# Patient Record
Sex: FEMALE | Race: WHITE | URBAN - METROPOLITAN AREA
[De-identification: names, ages, dates, MRNs, and addresses within clinical notes are randomized per-mention and may not be internally consistent; named-entity substitution may affect disease eponyms.]

---

## 2017-06-15 ENCOUNTER — OUTPATIENT (OUTPATIENT)
Dept: OUTPATIENT SERVICES | Facility: HOSPITAL | Age: 47
LOS: 1 days | Discharge: HOME | End: 2017-06-15

## 2017-06-15 DIAGNOSIS — R07.9 CHEST PAIN, UNSPECIFIED: ICD-10-CM

## 2017-06-15 DIAGNOSIS — R00.1 BRADYCARDIA, UNSPECIFIED: ICD-10-CM

## 2017-06-28 DIAGNOSIS — Z00.00 ENCOUNTER FOR GENERAL ADULT MEDICAL EXAMINATION WITHOUT ABNORMAL FINDINGS: ICD-10-CM

## 2018-06-01 ENCOUNTER — OUTPATIENT (OUTPATIENT)
Dept: OUTPATIENT SERVICES | Facility: HOSPITAL | Age: 48
LOS: 1 days | Discharge: HOME | End: 2018-06-01

## 2018-06-01 DIAGNOSIS — M25.561 PAIN IN RIGHT KNEE: ICD-10-CM

## 2018-06-20 ENCOUNTER — OUTPATIENT (OUTPATIENT)
Dept: OUTPATIENT SERVICES | Facility: HOSPITAL | Age: 48
LOS: 1 days | Discharge: HOME | End: 2018-06-20

## 2018-06-20 DIAGNOSIS — M25.561 PAIN IN RIGHT KNEE: ICD-10-CM

## 2018-06-20 DIAGNOSIS — M71.21 SYNOVIAL CYST OF POPLITEAL SPACE [BAKER], RIGHT KNEE: ICD-10-CM

## 2018-07-09 ENCOUNTER — OUTPATIENT (OUTPATIENT)
Dept: OUTPATIENT SERVICES | Facility: HOSPITAL | Age: 48
LOS: 1 days | Discharge: HOME | End: 2018-07-09

## 2018-07-09 DIAGNOSIS — M54.9 DORSALGIA, UNSPECIFIED: ICD-10-CM

## 2018-07-24 ENCOUNTER — OUTPATIENT (OUTPATIENT)
Dept: OUTPATIENT SERVICES | Facility: HOSPITAL | Age: 48
LOS: 1 days | Discharge: HOME | End: 2018-07-24

## 2018-07-24 DIAGNOSIS — R51 HEADACHE: ICD-10-CM

## 2019-01-08 ENCOUNTER — OUTPATIENT (OUTPATIENT)
Dept: OUTPATIENT SERVICES | Facility: HOSPITAL | Age: 49
LOS: 1 days | Discharge: HOME | End: 2019-01-08

## 2019-01-08 DIAGNOSIS — M54.2 CERVICALGIA: ICD-10-CM

## 2019-01-31 ENCOUNTER — OUTPATIENT (OUTPATIENT)
Dept: OUTPATIENT SERVICES | Facility: HOSPITAL | Age: 49
LOS: 1 days | Discharge: HOME | End: 2019-01-31

## 2019-01-31 DIAGNOSIS — F43.20 ADJUSTMENT DISORDER, UNSPECIFIED: ICD-10-CM

## 2019-03-13 ENCOUNTER — OUTPATIENT (OUTPATIENT)
Dept: OUTPATIENT SERVICES | Facility: HOSPITAL | Age: 49
LOS: 1 days | Discharge: HOME | End: 2019-03-13

## 2019-03-13 DIAGNOSIS — F43.20 ADJUSTMENT DISORDER, UNSPECIFIED: ICD-10-CM

## 2019-03-29 ENCOUNTER — OUTPATIENT (OUTPATIENT)
Dept: OUTPATIENT SERVICES | Facility: HOSPITAL | Age: 49
LOS: 1 days | Discharge: HOME | End: 2019-03-29

## 2019-03-29 DIAGNOSIS — F43.20 ADJUSTMENT DISORDER, UNSPECIFIED: ICD-10-CM

## 2019-04-12 ENCOUNTER — OUTPATIENT (OUTPATIENT)
Dept: OUTPATIENT SERVICES | Facility: HOSPITAL | Age: 49
LOS: 1 days | Discharge: HOME | End: 2019-04-12

## 2019-04-12 DIAGNOSIS — F43.20 ADJUSTMENT DISORDER, UNSPECIFIED: ICD-10-CM

## 2019-04-19 ENCOUNTER — OUTPATIENT (OUTPATIENT)
Dept: OUTPATIENT SERVICES | Facility: HOSPITAL | Age: 49
LOS: 1 days | Discharge: HOME | End: 2019-04-19

## 2019-04-19 DIAGNOSIS — F43.20 ADJUSTMENT DISORDER, UNSPECIFIED: ICD-10-CM

## 2019-04-26 ENCOUNTER — OUTPATIENT (OUTPATIENT)
Dept: OUTPATIENT SERVICES | Facility: HOSPITAL | Age: 49
LOS: 1 days | Discharge: HOME | End: 2019-04-26

## 2019-04-26 DIAGNOSIS — F43.20 ADJUSTMENT DISORDER, UNSPECIFIED: ICD-10-CM

## 2019-05-14 ENCOUNTER — OUTPATIENT (OUTPATIENT)
Dept: OUTPATIENT SERVICES | Facility: HOSPITAL | Age: 49
LOS: 1 days | Discharge: HOME | End: 2019-05-14

## 2019-05-14 DIAGNOSIS — F43.20 ADJUSTMENT DISORDER, UNSPECIFIED: ICD-10-CM

## 2019-05-31 PROBLEM — Z00.00 ENCOUNTER FOR PREVENTIVE HEALTH EXAMINATION: Status: ACTIVE | Noted: 2019-05-31

## 2019-06-12 ENCOUNTER — APPOINTMENT (OUTPATIENT)
Dept: NEUROSURGERY | Facility: CLINIC | Age: 49
End: 2019-06-12

## 2020-02-07 ENCOUNTER — TRANSCRIPTION ENCOUNTER (OUTPATIENT)
Age: 50
End: 2020-02-07

## 2021-03-09 ENCOUNTER — APPOINTMENT (OUTPATIENT)
Dept: UROLOGY | Facility: CLINIC | Age: 51
End: 2021-03-09

## 2021-07-13 ENCOUNTER — TRANSCRIPTION ENCOUNTER (OUTPATIENT)
Age: 51
End: 2021-07-13

## 2022-04-11 ENCOUNTER — APPOINTMENT (OUTPATIENT)
Dept: CARDIOLOGY | Facility: CLINIC | Age: 52
End: 2022-04-11
Payer: MEDICARE

## 2022-04-11 VITALS
OXYGEN SATURATION: 97 % | HEIGHT: 65 IN | DIASTOLIC BLOOD PRESSURE: 62 MMHG | SYSTOLIC BLOOD PRESSURE: 100 MMHG | HEART RATE: 57 BPM | WEIGHT: 100 LBS | TEMPERATURE: 97.4 F | BODY MASS INDEX: 16.66 KG/M2

## 2022-04-11 VITALS — HEIGHT: 65 IN | TEMPERATURE: 97.4 F | BODY MASS INDEX: 16.66 KG/M2 | WEIGHT: 100 LBS

## 2022-04-11 DIAGNOSIS — R00.2 PALPITATIONS: ICD-10-CM

## 2022-04-11 PROCEDURE — 93000 ELECTROCARDIOGRAM COMPLETE: CPT

## 2022-04-11 PROCEDURE — 99203 OFFICE O/P NEW LOW 30 MIN: CPT

## 2022-04-16 PROBLEM — R00.2 PALPITATIONS: Status: ACTIVE | Noted: 2022-04-11

## 2022-04-16 NOTE — ASSESSMENT
[FreeTextEntry1] : Assessment:\par #Palpitations\par - EKG sinus leidy 57 bpm\par #Former smoker\par \par Plan:\par - TTE\par - 14 day e-patch\par - Return to clinic in 3 months\par

## 2022-04-16 NOTE — PHYSICAL EXAM
[Well Developed] : well developed [Well Nourished] : well nourished [No Acute Distress] : no acute distress [Normal Conjunctiva] : normal conjunctiva [No Carotid Bruit] : no carotid bruit [Normal S1, S2] : normal S1, S2 [No Murmur] : no murmur [No Rub] : no rub [No Gallop] : no gallop [Clear Lung Fields] : clear lung fields [Good Air Entry] : good air entry [No Respiratory Distress] : no respiratory distress  [Soft] : abdomen soft [Moves all extremities] : moves all extremities [No Focal Deficits] : no focal deficits [Normal Speech] : normal speech [No ulcers] : no ulcers [No edema] : no edema [Present] : present bilaterally [de-identified] : Thin

## 2022-04-16 NOTE — REVIEW OF SYSTEMS
[Fever] : no fever [Chills] : no chills [SOB] : shortness of breath [Dyspnea on exertion] : not dyspnea during exertion [Chest Discomfort] : no chest discomfort [Lower Ext Edema] : no extremity edema [Leg Claudication] : no intermittent leg claudication [Palpitations] : palpitations [Orthopnea] : no orthopnea [PND] : no PND [Cough] : no cough [Syncope] : no syncope [Abdominal Pain] : no abdominal pain [Joint Pain] : no joint pain [Dizziness] : no dizziness [Confusion] : no confusion was observed

## 2022-04-16 NOTE — HISTORY OF PRESENT ILLNESS
[FreeTextEntry1] : 51F  with fibromyalgia, epilepsy, asthma, Raynaud's here for evaluation of palpitations. \par \par Pfizer COVID vaccine #1 - palpitations and SOB  a few hours after. Now feels frequent palpitations, lasts a few seconds. No lightheadedness/dizziness, pre-syncope/syncope, or lower extremity edema.\par \par Gluten sensitive \par \par Tobacco use - quit 20 years prior\par \par Work: not currently working, disabled\par \par Exercise: walking, light cardio, light resistance training, yoga\par \par FMH\par Father - kidney failure, rheumatic\par No FMH of CAD or stroke \par

## 2022-05-18 ENCOUNTER — INPATIENT (INPATIENT)
Facility: HOSPITAL | Age: 52
LOS: 5 days | Discharge: HOME | End: 2022-05-24
Attending: STUDENT IN AN ORGANIZED HEALTH CARE EDUCATION/TRAINING PROGRAM | Admitting: STUDENT IN AN ORGANIZED HEALTH CARE EDUCATION/TRAINING PROGRAM
Payer: MEDICARE

## 2022-05-18 VITALS
TEMPERATURE: 97 F | OXYGEN SATURATION: 98 % | HEART RATE: 79 BPM | RESPIRATION RATE: 17 BRPM | SYSTOLIC BLOOD PRESSURE: 106 MMHG | DIASTOLIC BLOOD PRESSURE: 68 MMHG

## 2022-05-18 LAB
ALBUMIN SERPL ELPH-MCNC: 4.6 G/DL — SIGNIFICANT CHANGE UP (ref 3.5–5.2)
ALP SERPL-CCNC: 72 U/L — SIGNIFICANT CHANGE UP (ref 30–115)
ALT FLD-CCNC: 14 U/L — SIGNIFICANT CHANGE UP (ref 0–41)
ANION GAP SERPL CALC-SCNC: 13 MMOL/L — SIGNIFICANT CHANGE UP (ref 7–14)
AST SERPL-CCNC: 27 U/L — SIGNIFICANT CHANGE UP (ref 0–41)
BASOPHILS # BLD AUTO: 0.03 K/UL — SIGNIFICANT CHANGE UP (ref 0–0.2)
BASOPHILS NFR BLD AUTO: 0.9 % — SIGNIFICANT CHANGE UP (ref 0–1)
BILIRUB SERPL-MCNC: 0.4 MG/DL — SIGNIFICANT CHANGE UP (ref 0.2–1.2)
BUN SERPL-MCNC: 12 MG/DL — SIGNIFICANT CHANGE UP (ref 10–20)
CALCIUM SERPL-MCNC: 9.7 MG/DL — SIGNIFICANT CHANGE UP (ref 8.5–10.1)
CHLORIDE SERPL-SCNC: 110 MMOL/L — SIGNIFICANT CHANGE UP (ref 98–110)
CO2 SERPL-SCNC: 23 MMOL/L — SIGNIFICANT CHANGE UP (ref 17–32)
CREAT SERPL-MCNC: 0.9 MG/DL — SIGNIFICANT CHANGE UP (ref 0.7–1.5)
EGFR: 76 ML/MIN/1.73M2 — SIGNIFICANT CHANGE UP
EOSINOPHIL # BLD AUTO: 0 K/UL — SIGNIFICANT CHANGE UP (ref 0–0.7)
EOSINOPHIL NFR BLD AUTO: 0 % — SIGNIFICANT CHANGE UP (ref 0–8)
GIANT PLATELETS BLD QL SMEAR: PRESENT — SIGNIFICANT CHANGE UP
GLUCOSE SERPL-MCNC: 96 MG/DL — SIGNIFICANT CHANGE UP (ref 70–99)
HCG SERPL QL: NEGATIVE — SIGNIFICANT CHANGE UP
HCT VFR BLD CALC: 39 % — SIGNIFICANT CHANGE UP (ref 37–47)
HGB BLD-MCNC: 13.6 G/DL — SIGNIFICANT CHANGE UP (ref 12–16)
LIDOCAIN IGE QN: 88 U/L — HIGH (ref 7–60)
LYMPHOCYTES # BLD AUTO: 0.82 K/UL — LOW (ref 1.2–3.4)
LYMPHOCYTES # BLD AUTO: 28.3 % — SIGNIFICANT CHANGE UP (ref 20.5–51.1)
MAGNESIUM SERPL-MCNC: 2.2 MG/DL — SIGNIFICANT CHANGE UP (ref 1.8–2.4)
MANUAL SMEAR VERIFICATION: SIGNIFICANT CHANGE UP
MCHC RBC-ENTMCNC: 34.3 PG — HIGH (ref 27–31)
MCHC RBC-ENTMCNC: 34.9 G/DL — SIGNIFICANT CHANGE UP (ref 32–37)
MCV RBC AUTO: 98.5 FL — SIGNIFICANT CHANGE UP (ref 81–99)
METAMYELOCYTES # FLD: 0.9 % — HIGH (ref 0–0)
MONOCYTES # BLD AUTO: 0.28 K/UL — SIGNIFICANT CHANGE UP (ref 0.1–0.6)
MONOCYTES NFR BLD AUTO: 9.7 % — HIGH (ref 1.7–9.3)
NEUTROPHILS # BLD AUTO: 1.55 K/UL — SIGNIFICANT CHANGE UP (ref 1.4–6.5)
NEUTROPHILS NFR BLD AUTO: 53.1 % — SIGNIFICANT CHANGE UP (ref 42.2–75.2)
PLAT MORPH BLD: NORMAL — SIGNIFICANT CHANGE UP
PLATELET # BLD AUTO: 210 K/UL — SIGNIFICANT CHANGE UP (ref 130–400)
POIKILOCYTOSIS BLD QL AUTO: SLIGHT — SIGNIFICANT CHANGE UP
POTASSIUM SERPL-MCNC: 5.2 MMOL/L — HIGH (ref 3.5–5)
POTASSIUM SERPL-SCNC: 5.2 MMOL/L — HIGH (ref 3.5–5)
PROT SERPL-MCNC: 7.3 G/DL — SIGNIFICANT CHANGE UP (ref 6–8)
RBC # BLD: 3.96 M/UL — LOW (ref 4.2–5.4)
RBC # FLD: 10.4 % — LOW (ref 11.5–14.5)
RBC BLD AUTO: NORMAL — SIGNIFICANT CHANGE UP
SARS-COV-2 RNA SPEC QL NAA+PROBE: DETECTED
SODIUM SERPL-SCNC: 146 MMOL/L — SIGNIFICANT CHANGE UP (ref 135–146)
TROPONIN T SERPL-MCNC: <0.01 NG/ML — SIGNIFICANT CHANGE UP
VARIANT LYMPHS # BLD: 7.1 % — HIGH (ref 0–5)
WBC # BLD: 2.91 K/UL — LOW (ref 4.8–10.8)
WBC # FLD AUTO: 2.91 K/UL — LOW (ref 4.8–10.8)

## 2022-05-18 PROCEDURE — 71045 X-RAY EXAM CHEST 1 VIEW: CPT | Mod: 26

## 2022-05-18 PROCEDURE — 99285 EMERGENCY DEPT VISIT HI MDM: CPT

## 2022-05-18 PROCEDURE — 93010 ELECTROCARDIOGRAM REPORT: CPT | Mod: 77

## 2022-05-18 PROCEDURE — 74176 CT ABD & PELVIS W/O CONTRAST: CPT | Mod: 26,MA

## 2022-05-18 PROCEDURE — 93010 ELECTROCARDIOGRAM REPORT: CPT | Mod: 76

## 2022-05-18 RX ORDER — ONDANSETRON 8 MG/1
4 TABLET, FILM COATED ORAL EVERY 8 HOURS
Refills: 0 | Status: DISCONTINUED | OUTPATIENT
Start: 2022-05-18 | End: 2022-05-24

## 2022-05-18 RX ORDER — TOPIRAMATE 25 MG
300 TABLET ORAL DAILY
Refills: 0 | Status: DISCONTINUED | OUTPATIENT
Start: 2022-05-18 | End: 2022-05-24

## 2022-05-18 RX ORDER — ENOXAPARIN SODIUM 100 MG/ML
40 INJECTION SUBCUTANEOUS EVERY 24 HOURS
Refills: 0 | Status: DISCONTINUED | OUTPATIENT
Start: 2022-05-18 | End: 2022-05-24

## 2022-05-18 RX ORDER — CHLORHEXIDINE GLUCONATE 213 G/1000ML
1 SOLUTION TOPICAL
Refills: 0 | Status: DISCONTINUED | OUTPATIENT
Start: 2022-05-18 | End: 2022-05-24

## 2022-05-18 RX ORDER — SODIUM CHLORIDE 9 MG/ML
1000 INJECTION, SOLUTION INTRAVENOUS
Refills: 0 | Status: DISCONTINUED | OUTPATIENT
Start: 2022-05-18 | End: 2022-05-24

## 2022-05-18 RX ORDER — SODIUM CHLORIDE 9 MG/ML
1000 INJECTION INTRAMUSCULAR; INTRAVENOUS; SUBCUTANEOUS ONCE
Refills: 0 | Status: COMPLETED | OUTPATIENT
Start: 2022-05-18 | End: 2022-05-18

## 2022-05-18 RX ORDER — ACETAMINOPHEN 500 MG
975 TABLET ORAL ONCE
Refills: 0 | Status: COMPLETED | OUTPATIENT
Start: 2022-05-18 | End: 2022-05-18

## 2022-05-18 RX ORDER — ONDANSETRON 8 MG/1
4 TABLET, FILM COATED ORAL ONCE
Refills: 0 | Status: COMPLETED | OUTPATIENT
Start: 2022-05-18 | End: 2022-05-18

## 2022-05-18 RX ORDER — SUCRALFATE 1 G
1 TABLET ORAL
Refills: 0 | Status: DISCONTINUED | OUTPATIENT
Start: 2022-05-18 | End: 2022-05-24

## 2022-05-18 RX ORDER — PANTOPRAZOLE SODIUM 20 MG/1
40 TABLET, DELAYED RELEASE ORAL
Refills: 0 | Status: DISCONTINUED | OUTPATIENT
Start: 2022-05-18 | End: 2022-05-19

## 2022-05-18 RX ADMIN — SODIUM CHLORIDE 1000 MILLILITER(S): 9 INJECTION INTRAMUSCULAR; INTRAVENOUS; SUBCUTANEOUS at 15:52

## 2022-05-18 RX ADMIN — ONDANSETRON 4 MILLIGRAM(S): 8 TABLET, FILM COATED ORAL at 18:20

## 2022-05-18 RX ADMIN — Medication 300 MILLIGRAM(S): at 22:28

## 2022-05-18 NOTE — ED PROVIDER NOTE - OBJECTIVE STATEMENT
52 yo female with a pmh of epilepsy and Raynaud's presents c/o weakness/fevers/chill/nausea/diarrhea/abdominal pain since testing covid+ on 5/09. pt states she has been unable to eat for a couple of days due to her nausea and has over 5 episodes of NB diarrhea daily. pt notes chest tightness. pt not fully vaccinated.

## 2022-05-18 NOTE — ED PROVIDER NOTE - NS ED ATTENDING STATEMENT MOD
This was a shared visit with the [facility]. I reviewed and verified the documentation and independently performed the documented:

## 2022-05-18 NOTE — ED PROVIDER NOTE - NS ED ROS FT
Constitutional: (+) fever  Eyes/ENT: (-) visual changes   Cardiovascular: (+) chest pain, (-) syncope  Respiratory: (+) cough, (-) shortness of breath  Gastrointestinal: (-) vomiting, (+) diarrhea  Genitourinary: (-) dysuria, (-) hesitancy, (-) frequency   Musculoskeletal: (-) neck pain, (-) back pain, (-) joint pain, body aches   Integumentary: (-) rash, (-) edema  Neurological: (-) headache, (-) altered mental status  Allergic/Immunologic: (-) pruritus

## 2022-05-18 NOTE — ED PROVIDER NOTE - CLINICAL SUMMARY MEDICAL DECISION MAKING FREE TEXT BOX
labs reviewed, trop neg, EKG sinus leidy @ 41, ct abd with no acute pathology.  Pt given IVF, admitted for gen weakness, unable to tolerate PO, near syncope, bradycardia

## 2022-05-18 NOTE — H&P ADULT - ASSESSMENT
Assessment and Plan  Case of a 51 year old female patient with Epilepsy and Raynaud Phenomenon who presented to the ED on 05/18 for evaluation of weakness in setting of recent COVID positive test at home, fever, chills, diarrhea, and cough, found to have sinus bradycardia but otherwise insignificant chest and abdominal imaging, to be admitted for further investigations, management, and monitoring. Currently still in sinus bradycardia around 50bpm.      Weakness in Setting of Diarrhea  Rule Out Clostridium Difficile Infection in Setting of Recent Ab Use for UTI  * Onset: 05/09: fever x3 days last week M-W, chills, dry cough, diarrhea since last week (improving), abdominal discomfort (no sore throat or runny nose)  * Recent AB use for UTI last month for 7-10 days (unsure about name of Ab)  * /68 mmHg, HR down to 38 bpm -> currently 50 bpm, T 36.1, SaO2 98% on RA  * CXR no acute process  * CT AP without contrast no intraabdominal process  * S/P 1L NS and Zofran in ED    - Consider GI consult if symptoms do not improve  - Monitor T  - Trend WBC  - Start IV Fluid hydration with LR at 100mL/hour. Keep NPO for now. S/P 1L NS in ED  - Check orthostatics and monitor BP  - Monitor nausea and keep score at 01/10: start IV Zofran 4mg Q8h PRN  - Start Protonix, Carafate, and Aluminum hydroxide PRN for dyspepsia  - Monitor off Abs  - Monitor BM for recurrence of diarrhea  - Check C difficile PCR and toxin in setting of recent Ab use for UTI last month for 7-10 days (unsure about name of Ab)  - Please send stool cultures   - Avoid laxatives pending infectious workup  - Check ESR, CRP, Lactoferrin  - PT/OT evaluation        Sinus Bradycardia in Setting of Recent COVID Positive Test  History of Sinus Bradycardia After First Pfizer Vaccine Dose  * History of Sinus Bradycardia down to HR 30's bpm few months ago after 1st dose of Pfizer Vaccine s/p Admission to [facility] s/p discharge off Diltiazem (but has resumed it herself) and advised against taking 2nd COVID vaccine/ booster  * Home med Diltiazem 120mg for Raynaud  * ED HR went down to 38bpm (asymptomatic; symptoms stable since 2 weeks)  * ECG sinus bradycardia with no block    - Cardiology evaluation: per patient, she has been evaluated at [facility] and etiology was thought to be related to 1st dose of Pfizer vaccine (asked to avoid further shots) s/p discharge of Diltiazem 120mg QD (but patient resumed it at home)  - Hold Diltiazem 120mg QD and follow up outpatient with Dr Bullard post discharge  - Avoid Beta blocking agents  - Keep atropine at bedside to be used for symptomatic bradycardia or very low HR  - Monitor tele  - Monitor HR  - Check TSH  - Will send lyme  - Check TTE      Recent COVID Positive Home Test  Pending COVID PCR 05/18  * Onset: 05/09: fever x3 days last week M-W, chills, dry cough, diarrhea since last week (improving), abdominal discomfort (no sore throat or runny nose)  * Vaccinated against COVID: 1x Pfizer only due to complication as above  * ED HR went down to 38bpm (asymptomatic; symptoms stable since 2 weeks)  * ED Labs WBC 2.91  * SARS-COV2: received  * CXR no acute process  * Markers as below    - Infectious Disease team consulted  - Monitor for fever: afebrile in last 24 hours  - Trend WBC  - Monitor SaO2 and Oxygen Requirements: on RA  - Follow up Chest X Ray    - Trend Inflammatory Markers:  --> ESR   --> D-dimer; check VA duplex venous LE  --> Procalcitonin   --> C-reactive protein  --> LDH   --> Ferritin   --> Fibrinogen  - Follow up blood cultures   - Send urine legionella and strep  - Monitor off antimicrobials or steroids  - Anticoagulation per protocol      Epilepsy  * Free of seizures for a long time  * Follows with Dr Angel  * On Topiramate 300mg QD  - Outpatient follow up with Dr Angel  - Resume Topiramate 300mg QD  - Follow up AED level in AM      Raynaud Syndrome  * Follows with Dr Bullard  * On Diltiazem 120mg   - Hold Diltiazem 120mg QD and follow up outpatient with Dr Bullard post discharge      Others  - DVT Prophylaxis: Lovenox 40mg Subcutaneously daily  - GI Prophylaxis: Pantoprazole 40mg PO QD  - Diet: NPO as above  - Code Status: Full      Barriers to learning:  NO  Discharge Planning: Patient will be discharged once stable   Plan was communicated with patient and medical team       Deisi Barron MD  PGY - 2 Internal Medicine   [facility]   Assessment and Plan  Case of a 51 year old female patient with Epilepsy and Raynaud Phenomenon who presented to the ED on 05/18 for evaluation of weakness in setting of recent COVID positive test at home, fever, chills, diarrhea, and cough, found to have sinus bradycardia but otherwise insignificant chest and abdominal imaging, to be admitted for further investigations, management, and monitoring. Currently still in sinus bradycardia around 50bpm.      Weakness in Setting of Diarrhea  Rule Out Clostridium Difficile Infection in Setting of Recent Ab Use for UTI  * Onset: 05/09: fever x3 days last week M-W, chills, dry cough, diarrhea since last week (improving), abdominal discomfort (no sore throat or runny nose)  * Recent AB use for UTI last month for 7-10 days (unsure about name of Ab)  * /68 mmHg, HR down to 38 bpm -> currently 50 bpm, T 36.1, SaO2 98% on RA  * CXR no acute process  * CT AP without contrast no intraabdominal process  * S/P 1L NS and Zofran in ED    - Consider GI consult if symptoms do not improve  - Monitor T  - Trend WBC  - Start IV Fluid hydration with LR at 100mL/hour. Keep NPO for now. S/P 1L NS in ED  - Check orthostatics and monitor BP  - Monitor nausea and keep score at 01/10: start IV Zofran 4mg Q8h PRN  - Start Protonix, Carafate, and Aluminum hydroxide PRN for dyspepsia  - Monitor off Abs  - Monitor BM for recurrence of diarrhea  - Check C difficile PCR and toxin in setting of recent Ab use for UTI last month for 7-10 days (unsure about name of Ab)  - Please send stool cultures, GI PCR  - Avoid laxatives pending infectious workup  - Check ESR, CRP, Lactoferrin  - PT/OT evaluation        Sinus Bradycardia in Setting of Recent COVID Positive Test  History of Sinus Bradycardia After First Pfizer Vaccine Dose  * History of Sinus Bradycardia down to HR 30's bpm few months ago after 1st dose of Pfizer Vaccine s/p Admission to [facility] s/p discharge off Diltiazem (but has resumed it herself) and advised against taking 2nd COVID vaccine/ booster  * Home med Diltiazem 120mg for Raynaud  * ED HR went down to 38bpm (asymptomatic; symptoms stable since 2 weeks)  * ECG sinus bradycardia with no block    - Cardiology evaluation: per patient, she has been evaluated at [facility] and etiology was thought to be related to 1st dose of Pfizer vaccine (asked to avoid further shots) s/p discharge of Diltiazem 120mg QD (but patient resumed it at home)  - Hold Diltiazem 120mg QD and follow up outpatient with Dr Bullard post discharge  - Avoid Beta blocking agents  - Keep atropine at bedside to be used for symptomatic bradycardia or very low HR  - Monitor tele  - Monitor HR  - Check TSH  - Will send lyme  - Check TTE      Recent COVID Positive Home Test  Pending COVID PCR 05/18  * Onset: 05/09: fever x3 days last week M-W, chills, dry cough, diarrhea since last week (improving), abdominal discomfort (no sore throat or runny nose)  * Vaccinated against COVID: 1x Pfizer only due to complication as above  * ED HR went down to 38bpm (asymptomatic; symptoms stable since 2 weeks)  * ED Labs WBC 2.91  * SARS-COV2: received  * CXR no acute process  * Markers as below    - Infectious Disease team consulted  - Monitor for fever: afebrile in last 24 hours  - Trend WBC  - Monitor SaO2 and Oxygen Requirements: on RA  - Follow up Chest X Ray    - Trend Inflammatory Markers:  --> ESR   --> D-dimer; check VA duplex venous LE  --> Procalcitonin   --> C-reactive protein  --> LDH   --> Ferritin   --> Fibrinogen  - Follow up blood cultures   - Send urine legionella and strep  - Check RVP and influenza  - Monitor off antimicrobials or steroids  - Anticoagulation per protocol      Epilepsy  * Free of seizures for a long time  * Follows with Dr Angel  * On Topiramate 300mg QD  - Outpatient follow up with Dr Angel  - Resume Topiramate 300mg QD  - Follow up AED level in AM      Raynaud Syndrome  * Follows with Dr Bullard  * On Diltiazem 120mg   - Hold Diltiazem 120mg QD and follow up outpatient with Dr Bullard post discharge      Others  - DVT Prophylaxis: Lovenox 40mg Subcutaneously daily  - GI Prophylaxis: Pantoprazole 40mg PO QD  - Diet: NPO as above  - Code Status: Full      Barriers to learning:  NO  Discharge Planning: Patient will be discharged once stable   Plan was communicated with patient and medical team       Deisi Barron MD  PGY - 2 Internal Medicine   [facility]   Assessment and Plan  Case of a 51 year old female patient with Epilepsy and Raynaud Phenomenon who presented to the ED on 05/18 for evaluation of weakness in setting of recent COVID positive test at home, fever, chills, diarrhea, and cough, found to have sinus bradycardia but otherwise insignificant chest and abdominal imaging, to be admitted for further investigations, management, and monitoring. Currently still in sinus bradycardia around 50bpm.      Weakness in Setting of Diarrhea  Rule Out Clostridium Difficile Infection in Setting of Recent Ab Use for UTI  * Onset: 05/09: fever x3 days last week M-W, chills, dry cough, diarrhea since last week (improving), abdominal discomfort (no sore throat or runny nose)  * Recent AB use for UTI last month for 7-10 days (unsure about name of Ab)  * /68 mmHg, HR down to 38 bpm -> currently 50 bpm, T 36.1, SaO2 98% on RA  * CXR no acute process  * CT AP without contrast no intraabdominal process  * S/P 1L NS and Zofran in ED    - Consider GI consult if symptoms do not improve  - Monitor T  - Trend WBC  - Start IV Fluid hydration with LR at 100mL/hour. Keep NPO for now. S/P 1L NS in ED  - Check orthostatics and monitor BP  - Monitor nausea and keep score at 01/10: start IV Zofran 4mg Q8h PRN  - Start Protonix, Carafate, and Aluminum hydroxide PRN for dyspepsia  - Monitor off Abs  - Monitor BM for recurrence of diarrhea: last episodes x3 on 05/18  - Check C difficile PCR and toxin in setting of recent Ab use for UTI last month for 7-10 days (unsure about name of Ab)  - Please send stool cultures, GI PCR  - Avoid laxatives pending infectious workup  - Check ESR, CRP, Lactoferrin  - PT/OT evaluation        Sinus Bradycardia in Setting of Recent COVID Positive Test  History of Sinus Bradycardia After First Pfizer Vaccine Dose  * History of Sinus Bradycardia down to HR 30's bpm few months ago after 1st dose of Pfizer Vaccine s/p Admission to [facility] s/p discharge off Diltiazem (but has resumed it herself) and advised against taking 2nd COVID vaccine/ booster  * Home med Diltiazem 120mg for Raynaud  * ED HR went down to 38bpm (asymptomatic; symptoms stable since 2 weeks)  * ECG sinus bradycardia with no block    - Cardiology evaluation: per patient, she has been evaluated at [facility] and etiology was thought to be related to 1st dose of Pfizer vaccine (asked to avoid further shots) s/p discharge of Diltiazem 120mg QD (but patient resumed it at home)  - Hold Diltiazem 120mg QD and follow up outpatient with Dr Bullard post discharge  - Avoid Beta blocking agents  - Keep atropine at bedside to be used for symptomatic bradycardia or very low HR  - Monitor tele  - Monitor HR  - Check TSH  - Will send lyme  - Check TTE      Recent COVID Positive Home Test  Pending COVID PCR 05/18  * Onset: 05/09: fever x3 days last week M-W, chills, dry cough, diarrhea since last week (improving), abdominal discomfort (no sore throat or runny nose)  * Vaccinated against COVID: 1x Pfizer only due to complication as above  * ED HR went down to 38bpm (asymptomatic; symptoms stable since 2 weeks)  * ED Labs WBC 2.91  * SARS-COV2: received  * CXR no acute process  * Markers as below    - Infectious Disease team consulted  - Monitor for fever: afebrile in last 24 hours  - Trend WBC  - Monitor SaO2 and Oxygen Requirements: on RA  - Follow up Chest X Ray    - Trend Inflammatory Markers:  --> ESR   --> D-dimer; check VA duplex venous LE  --> Procalcitonin   --> C-reactive protein  --> LDH   --> Ferritin   --> Fibrinogen  - Follow up blood cultures   - Send urine legionella and strep  - Check RVP and influenza  - Monitor off antimicrobials or steroids  - Anticoagulation per protocol      Epilepsy  * Free of seizures for a long time  * Follows with Dr Angel  * On Topiramate 300mg QD  - Outpatient follow up with Dr Angel  - Resume Topiramate 300mg QD  - Follow up AED level in AM      Raynaud Syndrome  * Follows with Dr Bullard  * On Diltiazem 120mg   - Hold Diltiazem 120mg QD and follow up outpatient with Dr Bullard post discharge      Others  - DVT Prophylaxis: Lovenox 40mg Subcutaneously daily  - GI Prophylaxis: Pantoprazole 40mg PO QD  - Diet: NPO as above  - Code Status: Full      Barriers to learning:  NO  Discharge Planning: Patient will be discharged once stable   Plan was communicated with patient and medical team       Deisi Barron MD  PGY - 2 Internal Medicine   [facility]   Assessment and Plan  Case of a 51 year old female patient with Epilepsy and Raynaud Phenomenon who presented to the ED on 05/18 for evaluation of weakness in setting of recent COVID positive test at home, fever, chills, diarrhea, and cough, found to have sinus bradycardia but otherwise insignificant chest and abdominal imaging, to be admitted for further investigations, management, and monitoring. Currently still in sinus bradycardia around 50bpm.      Weakness in Setting of Diarrhea  Rule Out Clostridium Difficile Infection in Setting of Recent Ab Use for UTI  * Onset: 05/09: fever x3 days last week M-W, chills, dry cough, diarrhea since last week (improving), abdominal discomfort (no sore throat or runny nose)  * Recent AB use for UTI last month for 7-10 days (unsure about name of Ab)  * /68 mmHg, HR down to 38 bpm -> currently 50 bpm, T 36.1, SaO2 98% on RA  * CXR no acute process  * CT AP without contrast no intraabdominal process  * S/P 1L NS and Zofran in ED    - Consider GI consult if symptoms do not improve  - Monitor T  - Trend WBC  - Start IV Fluid hydration with LR at 100mL/hour. Keep NPO for now. S/P 1L NS in ED  - Check orthostatics and monitor BP  - Monitor nausea and keep score at 01/10: start IV Zofran 4mg Q8h PRN  - Start Protonix, Carafate, and Aluminum hydroxide PRN for dyspepsia  - Monitor off Abs  - Monitor BM for recurrence of diarrhea: last episodes x3 on 05/18  - Check C difficile PCR and toxin in setting of recent Ab use for UTI last month for 7-10 days (unsure about name of Ab)  - Please send stool cultures, GI PCR  - Avoid laxatives pending infectious workup  - Check ESR, CRP, Lactoferrin  - PT/OT evaluation        Sinus Bradycardia in Setting of Recent COVID Positive Test  History of Sinus Bradycardia After First Pfizer Vaccine Dose  * History of Sinus Bradycardia down to HR 30's bpm few months ago after 1st dose of Pfizer Vaccine s/p Admission to [facility] s/p discharge off Diltiazem (but has resumed it herself) and advised against taking 2nd COVID vaccine/ booster  * Home med Diltiazem 120mg for Raynaud  * ED HR went down to 38bpm (asymptomatic; symptoms stable since 2 weeks)  * ECG sinus bradycardia with no block    - Cardiology evaluation: per patient, she has been evaluated at [facility] and etiology was thought to be related to 1st dose of Pfizer vaccine (asked to avoid further shots) s/p discharge of Diltiazem 120mg QD (but patient resumed it at home)  - Hold Diltiazem 120mg QD and follow up outpatient with Dr Bullard post discharge  - Avoid Beta blocking agents  - Keep atropine at bedside to be used for symptomatic bradycardia or very low HR  - Monitor tele  - Monitor HR  - Check TSH  - Will send lyme  - Check TTE      Recent COVID Positive Home Test  Pending COVID PCR 05/18  Leukopenia  * Onset: 05/09: fever x3 days last week M-W, chills, dry cough, diarrhea since last week (improving), abdominal discomfort (no sore throat or runny nose)  * Vaccinated against COVID: 1x Pfizer only due to complication as above  * ED HR went down to 38bpm (asymptomatic; symptoms stable since 2 weeks)  * ED Labs WBC 2.91  * SARS-COV2: received  * CXR no acute process  * Markers as below    - Infectious Disease team consulted  - Monitor for fever: afebrile in last 24 hours  - Trend WBC  - Monitor SaO2 and Oxygen Requirements: on RA  - Follow up Chest X Ray    - Trend Inflammatory Markers:  --> ESR   --> D-dimer; check VA duplex venous LE  --> Procalcitonin   --> C-reactive protein  --> LDH   --> Ferritin   --> Fibrinogen  - Follow up blood cultures   - Send urine legionella and strep  - Check RVP and influenza  - Monitor off antimicrobials or steroids  - Anticoagulation per protocol      Epilepsy  * Free of seizures for a long time  * Follows with Dr Angel  * On Topiramate 300mg QD  - Outpatient follow up with Dr Angel  - Resume Topiramate 300mg QD  - Follow up AED level in AM      Raynaud Syndrome  * Follows with Dr Bullard  * On Diltiazem 120mg   - Hold Diltiazem 120mg QD and follow up outpatient with Dr Bullard post discharge      Others  - DVT Prophylaxis: Lovenox 40mg Subcutaneously daily  - GI Prophylaxis: Pantoprazole 40mg PO QD  - Diet: NPO as above  - Code Status: Full      Barriers to learning:  NO  Discharge Planning: Patient will be discharged once stable   Plan was communicated with patient and medical team       Deisi Barron MD  PGY - 2 Internal Medicine   [facility]   Assessment and Plan  Case of a 51 year old female patient with Epilepsy and Raynaud Phenomenon who presented to the ED on 05/18 for evaluation of weakness in setting of recent COVID positive test at home, fever, chills, diarrhea, and cough, found to have sinus bradycardia but otherwise insignificant chest and abdominal imaging, to be admitted for further investigations, management, and monitoring. Currently still in sinus bradycardia around 50bpm.      Weakness in Setting of Diarrhea  Rule Out Clostridium Difficile Infection in Setting of Recent Ab Use for UTI  * Onset: 05/09: fever x3 days last week M-W, chills, dry cough, diarrhea since last week (improving), abdominal discomfort (no sore throat or runny nose)  * Recent AB use for UTI last month for 7-10 days (unsure about name of Ab)  * /68 mmHg, HR down to 38 bpm -> currently 50 bpm, T 36.1, SaO2 98% on RA  * CXR no acute process  * CT AP without contrast no intraabdominal process  * S/P 1L NS and Zofran in ED    - Consider GI consult if symptoms do not improve  - Monitor T  - Trend WBC  - Start IV Fluid hydration with LR at 100mL/hour. Keep NPO for now. S/P 1L NS in ED  - Check orthostatics and monitor BP  - Monitor nausea and keep score at 01/10: start IV Zofran 4mg Q8h PRN  - Start Protonix, Carafate, and Aluminum hydroxide PRN for dyspepsia  - Monitor off Abs  - Monitor BM for recurrence of diarrhea: last episodes x3 on 05/18  - Check C difficile PCR and toxin in setting of recent Ab use for UTI last month for 7-10 days (unsure about name of Ab)  - Please send stool cultures, GI PCR  - Avoid laxatives pending infectious workup  - Check ESR, CRP, Lactoferrin  - PT/OT evaluation        Sinus Bradycardia (in Setting of Recent COVID Positive Test + On Diltiazem for Raynaud)  History of Sinus Bradycardia After First Pfizer Vaccine Dose while being on Diltiazem  * History of Sinus Bradycardia down to HR 30's bpm few months ago after 1st dose of Pfizer Vaccine s/p Admission to [facility] s/p discharge off Diltiazem (but has resumed it herself) and advised against taking 2nd COVID vaccine/ booster  * Home med Diltiazem 120mg for Raynaud  * ED HR went down to 38bpm (asymptomatic; symptoms stable since 2 weeks)  * ECG sinus bradycardia with no block    - Cardiology evaluation: per patient, she has been evaluated at [facility] and etiology was thought to be related to 1st dose of Pfizer vaccine (asked to avoid further shots) s/p discharge of Diltiazem 120mg QD (but patient resumed it at home)  - Hold Diltiazem 120mg QD and follow up outpatient with Dr Bullard post discharge  - Avoid Beta blocking agents  - Keep atropine at bedside to be used for symptomatic bradycardia or very low HR  - Monitor tele  - Monitor HR  - Check TSH  - Will send lyme  - Check TTE      Recent COVID Positive Home Test  Pending COVID PCR 05/18  Leukopenia  * Onset: 05/09: fever x3 days last week M-W, chills, dry cough, diarrhea since last week (improving), abdominal discomfort (no sore throat or runny nose)  * Vaccinated against COVID: 1x Pfizer only due to complication as above  * ED HR went down to 38bpm (asymptomatic; symptoms stable since 2 weeks)  * ED Labs WBC 2.91  * SARS-COV2: received  * CXR no acute process  * Markers as below    - Infectious Disease team consulted  - Monitor for fever: afebrile in last 24 hours  - Trend WBC  - Monitor SaO2 and Oxygen Requirements: on RA  - Follow up Chest X Ray    - Trend Inflammatory Markers:  --> ESR   --> D-dimer; check VA duplex venous LE  --> Procalcitonin   --> C-reactive protein  --> LDH   --> Ferritin   --> Fibrinogen  - Follow up blood cultures   - Send urine legionella and strep  - Check RVP and influenza  - Monitor off antimicrobials or steroids  - Anticoagulation per protocol      Epilepsy  * Free of seizures for a long time  * Follows with Dr Angel  * On Topiramate 300mg QD  - Outpatient follow up with Dr Angel  - Resume Topiramate 300mg QD  - Follow up AED level in AM      Raynaud Syndrome  * Follows with Dr Bullard  * On Diltiazem 120mg   - Hold Diltiazem 120mg QD and follow up outpatient with Dr Bullard post discharge      Others  - DVT Prophylaxis: Lovenox 40mg Subcutaneously daily  - GI Prophylaxis: Pantoprazole 40mg PO QD  - Diet: NPO as above  - Code Status: Full      Barriers to learning:  NO  Discharge Planning: Patient will be discharged once stable   Plan was communicated with patient and medical team       Deisi Barron MD  PGY - 2 Internal Medicine   [facility]   Assessment and Plan  Case of a 51 year old female patient with Epilepsy and Raynaud Phenomenon who presented to the ED on 05/18 for evaluation of weakness in setting of recent COVID positive test at home, fever, chills, diarrhea, and cough, found to have sinus bradycardia but otherwise insignificant chest and abdominal imaging, to be admitted for further investigations, management, and monitoring. Currently still in sinus bradycardia around 50bpm.    Weakness in Setting of Diarrhea  Rule Out Clostridium Difficile Infection in Setting of Recent Ab Use for UTI  * Onset: 05/09: fever x3 days last week M-W, chills, dry cough, diarrhea since last week (improving), abdominal discomfort (no sore throat or runny nose)  * Recent AB use for UTI last month for 7-10 days (unsure about name of Ab)  * /68 mmHg, HR down to 38 bpm -> currently 50 bpm, T 36.1, SaO2 98% on RA  * CXR no acute process  * CT AP without contrast no intraabdominal process  * S/P 1L NS and Zofran in ED    - Consider GI consult if symptoms do not improve  - Monitor T  - Trend WBC  - Start IV Fluid hydration with LR at 100mL/hour. Keep NPO for now. S/P 1L NS in ED  - Check orthostatics and monitor BP  - Monitor nausea and keep score at 01/10: start IV Zofran 4mg Q8h PRN  - Start Protonix, Carafate, and Aluminum hydroxide PRN for dyspepsia  - Monitor off Abs  - Monitor BM for recurrence of diarrhea: last episodes x3 on 05/18  - Check C difficile PCR and toxin in setting of recent Ab use for UTI last month for 7-10 days (unsure about name of Ab)  - Please send stool cultures, GI PCR  - Avoid laxatives pending infectious workup  - Check ESR, CRP, Lactoferrin  - PT/OT evaluation    Sinus Bradycardia (in Setting of Recent COVID Positive Test + On Diltiazem for Raynaud)  History of Sinus Bradycardia After First Pfizer Vaccine Dose while being on Diltiazem  * History of Sinus Bradycardia down to HR 30's bpm few months ago after 1st dose of Pfizer Vaccine s/p Admission to [facility] s/p discharge off Diltiazem (but has resumed it herself) and advised against taking 2nd COVID vaccine/ booster  * Home med Diltiazem 120mg for Raynaud  * ED HR went down to 38bpm (asymptomatic; symptoms stable since 2 weeks)  * ECG sinus bradycardia with no block    - Cardiology evaluation: per patient, she has been evaluated at [facility] and etiology was thought to be related to 1st dose of Pfizer vaccine (asked to avoid further shots) s/p discharge of Diltiazem 120mg QD (but patient resumed it at home)  - Hold Diltiazem 120mg QD and follow up outpatient with Dr Bullard post discharge  - Avoid Beta blocking agents  - Keep atropine at bedside to be used for symptomatic bradycardia or very low HR  - Monitor tele  - Monitor HR  - Check TSH  - Will send lyme  - Check TTE      Recent COVID Positive Home Test  Pending COVID PCR 05/18  Leukopenia  * Onset: 05/09: fever x3 days last week M-W, chills, dry cough, diarrhea since last week (improving), abdominal discomfort (no sore throat or runny nose)  * Vaccinated against COVID: 1x Pfizer only due to complication as above  * ED HR went down to 38bpm (asymptomatic; symptoms stable since 2 weeks)  * ED Labs WBC 2.91  * SARS-COV2: received  * CXR no acute process  * Markers as below    - Infectious Disease team consulted  - Monitor for fever: afebrile in last 24 hours  - Trend WBC  - Monitor SaO2 and Oxygen Requirements: on RA  - Follow up Chest X Ray    - Trend Inflammatory Markers:  --> ESR   --> D-dimer; check VA duplex venous LE  --> Procalcitonin   --> C-reactive protein  --> LDH   --> Ferritin   --> Fibrinogen  - Follow up blood cultures   - Send urine legionella and strep  - Check RVP and influenza  - Monitor off antimicrobials or steroids  - Anticoagulation per protocol      Epilepsy  * Free of seizures for a long time  * Follows with Dr Angel  * On Topiramate 300mg QD  - Outpatient follow up with Dr Angel  - Resume Topiramate 300mg QD  - Follow up AED level in AM      Raynaud Syndrome  * Follows with Dr Bullard  * On Diltiazem 120mg   - Hold Diltiazem 120mg QD and follow up outpatient with Dr Bullard post discharge      Others  - DVT Prophylaxis: Lovenox 40mg Subcutaneously daily  - GI Prophylaxis: Pantoprazole 40mg PO QD  - Diet: NPO as above  - Code Status: Full      Barriers to learning:  NO  Discharge Planning: Patient will be discharged once stable   Plan was communicated with patient and medical team       Deisi Barron MD  PGY - 2 Internal Medicine   [facility]

## 2022-05-18 NOTE — H&P ADULT - TIME BILLING
HPI as above.  Interval history: Pt seen and examined at bedside. No cp or sob. Pt still complaining of dizziness. Unable to eat as she has pain after eating. No diarrhea since   Vital Signs (24 Hrs):  T(C): 36.8 (05-19-22 @ 06:55), Max: 37.1 (05-19-22 @ 06:07)  HR: 34 (05-19-22 @ 06:55) (34 - 79)  BP: 113/66 (05-19-22 @ 06:55) (91/55 - 117/68)  RR: 21 (05-19-22 @ 06:55) (17 - 21)  SpO2: 98% (05-19-22 @ 06:55) (98% - 99%)  Wt(kg): --  Daily     Daily     I&O's Summary    PHYSICAL EXAM:  GENERAL: NAD, well-developed  HEAD:  Atraumatic, Normocephalic  EYES: EOMI, PERRLA, conjunctiva and sclera clear  NECK: Supple, No JVD  CHEST/LUNG: Clear to auscultation bilaterally; No wheeze  HEART: leidy rate and rhythm; No murmurs, rubs, or gallops  ABDOMEN: Soft, epigastric tender, Nondistended; Bowel sounds present  EXTREMITIES:  2+ Peripheral Pulses, No clubbing, cyanosis, or edema  PSYCH: AAOx3  NEUROLOGY: non-focal  SKIN: No rashes or lesions  Labs reviewed  Imaging reviewed independently and reviewed read  < from: CT Abdomen and Pelvis No Cont (05.18.22 @ 17:27) >    IMPRESSION:    No definite acute abnormality in the abdomen and pelvis.    EKG reviewed independently and reviewed read  < from: Xray Chest 1 View- PORTABLE-Urgent (05.18.22 @ 16:08) >    Impression:    No radiographic evidence of acute cardiopulmonary disease.    < end of copied text >    Plan  #Abd pain after eating  #diahrrea  - diarrhea seems resolved, pt having abd pain and buring after eating, on ppi, maalox and zofran, not resolving, GI consult for possible stroke  #CoVID- mild- first day 5/9, now day 10, DC isolation, follow up ID, supportive   #symptomatic bradicardia- dilt is DCed, neuro consult to assit with possible dc topomax, EP for possible PPM placement, cardiac telemonitoring, echo, trops neg x3  #rest as above    #Progress Note Handoff  Pending (specify): EP, cardiology, ID, Neuro, GI  Family discussion: alena pt at bedside  Disposition: cardiac telemonitoring

## 2022-05-18 NOTE — H&P ADULT - HISTORY OF PRESENT ILLNESS
History of Present Illness  Ms. Patterson is a 51 year old female known to have:  - Epilepsy. Free of seizures for a long time. Follows with Dr Angel. On Topiramate 300mg QD  - Raynaud Syndrome. Follows with Dr Bullard. On Diltiazem 120mg   - History of Sinus Bradycardia down to HR 30's bpm few months ago after 1st dose of Pfizer Vaccine s/p Admission to [facility] s/p discharge off Diltiazem (but has resumed it herself) and advised against taking 2nd COVID vaccine/ booster      She presented to the ED on 05/18 for evaluation of weakness in setting of recent COVID positive test on 05/09.  History goes back to 05/09 when the patient tested positive for COVID at home.  She reports a low grade fever last Monday until Wednesday associated with chills, dry cough, nausea, diffuse abdominal discomfort, and watery diarrhea (around 5 times per day since last Monday; now getting softer but still frequent).  Since then, she reports progressive weakness and reduced PO intake in the absence of vomiting.      On review of systems, patient reports a recent UTI last month s/p 7-10 day of an AB course (couldn't recall name).  She also reports intermittent chest tightness, skipping sensation, and light headedness on exertion.  She otherwise denies any recent night sweats, URTI symptoms (rhinorrhea, sore throat), headache.   No sick contacts.   No recent travel or exposure to recent travelers.      Upon presentation to the ED, the patient was hemodynamically stable:  Vital Signs in ED  - /68 mmHg  - HR 79  - RR 17  - T 36.1  - Sao2 98% on RA      Investigations   Laboratory Workup  - CBC:                        13.6   2.91  )-----------( 210      ( 18 May 2022 16:28 )             39.0     - Chemistry:  05-18    146  |  110  |  12  ----------------------------<  96  5.2<H>   |  23  |  0.9    Ca    9.7      18 May 2022 16:28  Mg     2.2     05-18    TPro  7.3  /  Alb  4.6  /  TBili  0.4  /  DBili  x   /  AST  27  /  ALT  14  /  AlkPhos  72  05-18    - Cardiac Markers:  CARDIAC MARKERS ( 18 May 2022 16:28 )  x     / <0.01 ng/mL / x     / x     / x          Microbiology  * COVID received      Radiological Workup  * CXR no acute process  * CT AP without contrast no acute process    - Patient received 1L NS bolus in ED  - She also received Zofran  - She will be admitted for further investigations, management, and monitoring.             History of Present Illness  Ms. Patterson is a 51 year old female known to have:  - Epilepsy. Seizure-free for a long time. Follows with Dr Angel. On Topiramate 300mg QD  - Raynaud Syndrome. Follows with Dr Bullard. On Diltiazem 120mg   - History of Sinus Bradycardia down to HR 30's bpm few months ago after 1st dose of Pfizer Vaccine s/p Admission to [facility] s/p discharge off Diltiazem (but has resumed it herself) and advised against taking 2nd COVID vaccine/ booster      She presented to the ED on 05/18 for evaluation of weakness in setting of recent COVID positive test on 05/09.  History goes back to 05/09 when the patient tested positive for COVID at home.  She reports a low grade fever last Monday until Wednesday associated with chills, dry cough, nausea, diffuse abdominal discomfort (burning), and watery loose stools (around 5+ times per day since last Monday; now getting softer and less frequent; last had 3 episodes on 05/18 per patient).  Since then, she reports progressive weakness and reduced PO intake in the absence of vomiting.      On review of systems, patient reports a recent UTI last month s/p 7-10 day of an AB course (couldn't recall name).  She also reports intermittent chest tightness, skipping sensation, and light headedness on exertion.  She otherwise denies any recent night sweats, URTI symptoms (rhinorrhea, sore throat), headache.   No sick contacts.   No recent travel or exposure to recent travelers.      Upon presentation to the ED, the patient was hemodynamically stable:  Vital Signs in ED  - /68 mmHg  - HR 79  - RR 17  - T 36.1  - Sao2 98% on RA      Investigations   Laboratory Workup  - CBC:                        13.6   2.91  )-----------( 210      ( 18 May 2022 16:28 )             39.0     - Chemistry:  05-18    146  |  110  |  12  ----------------------------<  96  5.2<H>   |  23  |  0.9    Ca    9.7      18 May 2022 16:28  Mg     2.2     05-18    TPro  7.3  /  Alb  4.6  /  TBili  0.4  /  DBili  x   /  AST  27  /  ALT  14  /  AlkPhos  72  05-18    - Cardiac Markers:  CARDIAC MARKERS ( 18 May 2022 16:28 )  x     / <0.01 ng/mL / x     / x     / x          Microbiology  * COVID received      Radiological Workup  * CXR no acute process  * CT AP without contrast no acute process    - Patient received 1L NS bolus in ED  - She also received Zofran  - She will be admitted for further investigations, management, and monitoring.

## 2022-05-18 NOTE — ED ADULT TRIAGE NOTE - CHIEF COMPLAINT QUOTE
"I've been having belly pains and nausea since I tested (+) for Covid last Monday. I'm very weak." (+) poor appetite.

## 2022-05-18 NOTE — ED PROVIDER NOTE - NSTIMEPROVIDERCAREINITIATE_GEN_ER
Over the last 2 weeks, how often have you been bothered by the following problems?          PHQ2 Score: 1  PHQ2 Score Interpretation: No further screening needed  1. Little interest or pleasure in activity?: 0  2. Feeling down, depressed, or hopeless?: 1        18-May-2022 14:36

## 2022-05-18 NOTE — H&P ADULT - NSHPPHYSICALEXAM_GEN_ALL_CORE
- Physical Exam in ED  * General Appearance: Alert but a little lethargic, cooperative, interactive, oriented to time, place, and person, in no acute distress  * Head: Normocephalic, without obvious abnormality, atraumatic  * Throat: Lips, mucosa, and tongue normal; teeth and gums normal  * Neck: Supple, symmetrical, trachea midline, no adenopathy;   * Lungs: Respirations unlabored, Good bilateral air entry, normal breath sounds (Clear to auscultation bilaterally, no audible wheezes, crackles, or rhonchi)  * Heart: Regular Rate and Rhythm, normal S1 and S2, no audible murmur, rub, or gallop  * Abdomen: Symmetric, non-distended, soft, mild diffuse tenderness, bowel sounds active all four quadrants, no masses, no organomegaly (no hepatosplenomegaly)  * Extremities: Extremities normal, atraumatic, no cyanosis, no lower extremity pitting edema bilaterally, adequate dorsalis pedis pulses  * Pulses: 2+ and symmetric all extremities  * Skin: Skin color, texture, turgor normal, no rashes or lesions  * Lymph nodes: Cervical, supraclavicular, and axillary nodes normal  * Neurologic: CNII-XII intact, normal strength, sensation and reflexes throughout

## 2022-05-18 NOTE — ED PROVIDER NOTE - ATTENDING APP SHARED VISIT CONTRIBUTION OF CARE
52 y/o female with h/o seizure on topamax, raynauds, in ER with covid related complaints.  pt tested + on 5/9/2022 - cough, fever/chills, +n, +d.  +body aches.  Pt states she isn't febrile any longer, but has been feeling increasingly weak,  Unable to tolerate any PO 2/2 persistent nausea.  feels dizzy and weak.  + near syncope.  + chest pain since being diagnosed - constant pain across chest.  no sob.  + mild HA, improved from when symptoms first started.  no longer having fever now. + generalized weakness/fatigue.  Pt had pfizer shot x 1 a few months ago, states she developed palpitations/skipped beats afterward, was seen at [facility], per pt HR in 30's, was d/c'd home, f/u with her pmd who sent her to cardiology, Dr. Alvarado - pt had MCOT for a few weeks, just sent the monitor back in ~ 1 week ago, doesn't know results of monitoring.  is supposed to have stress test next month.  PE - nad, + appears weak, nc/at, eomi, perrl, op -mm dry, no pharyngeal erythema, neck supple, no resp distress, + normal WOB, cta b/l, no w/r/r,  HR 40's-50's, reg, abd - soft, mild diffuse tenderness, no guarding/rebound, nabs, from x 4, no LE swelling/tenderness, A&O x 3, cn 2-12 intact, no motor/sensory deficits.  -ivf, check labs, cxr, ct abd

## 2022-05-19 LAB
A1C WITH ESTIMATED AVERAGE GLUCOSE RESULT: 5.2 % — SIGNIFICANT CHANGE UP (ref 4–5.6)
ALBUMIN SERPL ELPH-MCNC: 4 G/DL — SIGNIFICANT CHANGE UP (ref 3.5–5.2)
ALP SERPL-CCNC: 66 U/L — SIGNIFICANT CHANGE UP (ref 30–115)
ALT FLD-CCNC: 10 U/L — SIGNIFICANT CHANGE UP (ref 0–41)
ANION GAP SERPL CALC-SCNC: 12 MMOL/L — SIGNIFICANT CHANGE UP (ref 7–14)
AST SERPL-CCNC: 12 U/L — SIGNIFICANT CHANGE UP (ref 0–41)
BASOPHILS # BLD AUTO: 0.02 K/UL — SIGNIFICANT CHANGE UP (ref 0–0.2)
BASOPHILS NFR BLD AUTO: 0.6 % — SIGNIFICANT CHANGE UP (ref 0–1)
BILIRUB SERPL-MCNC: 0.4 MG/DL — SIGNIFICANT CHANGE UP (ref 0.2–1.2)
BUN SERPL-MCNC: 10 MG/DL — SIGNIFICANT CHANGE UP (ref 10–20)
CALCIUM SERPL-MCNC: 9.3 MG/DL — SIGNIFICANT CHANGE UP (ref 8.5–10.1)
CHLORIDE SERPL-SCNC: 111 MMOL/L — HIGH (ref 98–110)
CHOLEST SERPL-MCNC: 176 MG/DL — SIGNIFICANT CHANGE UP
CO2 SERPL-SCNC: 19 MMOL/L — SIGNIFICANT CHANGE UP (ref 17–32)
CREAT SERPL-MCNC: 0.8 MG/DL — SIGNIFICANT CHANGE UP (ref 0.7–1.5)
D DIMER BLD IA.RAPID-MCNC: 165 NG/ML DDU — SIGNIFICANT CHANGE UP (ref 0–230)
EGFR: 88 ML/MIN/1.73M2 — SIGNIFICANT CHANGE UP
EOSINOPHIL # BLD AUTO: 0.03 K/UL — SIGNIFICANT CHANGE UP (ref 0–0.7)
EOSINOPHIL NFR BLD AUTO: 0.8 % — SIGNIFICANT CHANGE UP (ref 0–8)
ERYTHROCYTE [SEDIMENTATION RATE] IN BLOOD: 8 MM/HR — SIGNIFICANT CHANGE UP (ref 0–20)
ESTIMATED AVERAGE GLUCOSE: 103 MG/DL — SIGNIFICANT CHANGE UP (ref 68–114)
FERRITIN SERPL-MCNC: 158 NG/ML — HIGH (ref 15–150)
FOLATE SERPL-MCNC: >20 NG/ML — SIGNIFICANT CHANGE UP
GLUCOSE SERPL-MCNC: 83 MG/DL — SIGNIFICANT CHANGE UP (ref 70–99)
HCT VFR BLD CALC: 37.1 % — SIGNIFICANT CHANGE UP (ref 37–47)
HDLC SERPL-MCNC: 45 MG/DL — LOW
HGB BLD-MCNC: 13 G/DL — SIGNIFICANT CHANGE UP (ref 12–16)
IMM GRANULOCYTES NFR BLD AUTO: 0.3 % — SIGNIFICANT CHANGE UP (ref 0.1–0.3)
LDH SERPL L TO P-CCNC: 144 — SIGNIFICANT CHANGE UP (ref 50–242)
LIPID PNL WITH DIRECT LDL SERPL: 110 MG/DL — HIGH
LYMPHOCYTES # BLD AUTO: 1.53 K/UL — SIGNIFICANT CHANGE UP (ref 1.2–3.4)
LYMPHOCYTES # BLD AUTO: 43 % — SIGNIFICANT CHANGE UP (ref 20.5–51.1)
MAGNESIUM SERPL-MCNC: 1.9 MG/DL — SIGNIFICANT CHANGE UP (ref 1.8–2.4)
MCHC RBC-ENTMCNC: 34.4 PG — HIGH (ref 27–31)
MCHC RBC-ENTMCNC: 35 G/DL — SIGNIFICANT CHANGE UP (ref 32–37)
MCV RBC AUTO: 98.1 FL — SIGNIFICANT CHANGE UP (ref 81–99)
MONOCYTES # BLD AUTO: 0.31 K/UL — SIGNIFICANT CHANGE UP (ref 0.1–0.6)
MONOCYTES NFR BLD AUTO: 8.7 % — SIGNIFICANT CHANGE UP (ref 1.7–9.3)
NEUTROPHILS # BLD AUTO: 1.66 K/UL — SIGNIFICANT CHANGE UP (ref 1.4–6.5)
NEUTROPHILS NFR BLD AUTO: 46.6 % — SIGNIFICANT CHANGE UP (ref 42.2–75.2)
NON HDL CHOLESTEROL: 131 MG/DL — HIGH
NRBC # BLD: 0 /100 WBCS — SIGNIFICANT CHANGE UP (ref 0–0)
PHOSPHATE SERPL-MCNC: 3 MG/DL — SIGNIFICANT CHANGE UP (ref 2.1–4.9)
PLATELET # BLD AUTO: 206 K/UL — SIGNIFICANT CHANGE UP (ref 130–400)
POTASSIUM SERPL-MCNC: 3.9 MMOL/L — SIGNIFICANT CHANGE UP (ref 3.5–5)
POTASSIUM SERPL-SCNC: 3.9 MMOL/L — SIGNIFICANT CHANGE UP (ref 3.5–5)
PROT SERPL-MCNC: 6 G/DL — SIGNIFICANT CHANGE UP (ref 6–8)
RBC # BLD: 3.78 M/UL — LOW (ref 4.2–5.4)
RBC # FLD: 10.6 % — LOW (ref 11.5–14.5)
SODIUM SERPL-SCNC: 142 MMOL/L — SIGNIFICANT CHANGE UP (ref 135–146)
TRIGL SERPL-MCNC: 103 MG/DL — SIGNIFICANT CHANGE UP
TROPONIN T SERPL-MCNC: <0.01 NG/ML — SIGNIFICANT CHANGE UP
TROPONIN T SERPL-MCNC: <0.01 NG/ML — SIGNIFICANT CHANGE UP
VIT B12 SERPL-MCNC: 1031 PG/ML — SIGNIFICANT CHANGE UP (ref 232–1245)
WBC # BLD: 3.56 K/UL — LOW (ref 4.8–10.8)
WBC # FLD AUTO: 3.56 K/UL — LOW (ref 4.8–10.8)

## 2022-05-19 PROCEDURE — 99222 1ST HOSP IP/OBS MODERATE 55: CPT

## 2022-05-19 PROCEDURE — 99221 1ST HOSP IP/OBS SF/LOW 40: CPT

## 2022-05-19 PROCEDURE — 71045 X-RAY EXAM CHEST 1 VIEW: CPT | Mod: 26

## 2022-05-19 PROCEDURE — 93970 EXTREMITY STUDY: CPT | Mod: 26

## 2022-05-19 RX ORDER — ACETAMINOPHEN 500 MG
650 TABLET ORAL EVERY 6 HOURS
Refills: 0 | Status: DISCONTINUED | OUTPATIENT
Start: 2022-05-19 | End: 2022-05-24

## 2022-05-19 RX ORDER — PANTOPRAZOLE SODIUM 20 MG/1
40 TABLET, DELAYED RELEASE ORAL
Refills: 0 | Status: DISCONTINUED | OUTPATIENT
Start: 2022-05-19 | End: 2022-05-24

## 2022-05-19 RX ADMIN — Medication 650 MILLIGRAM(S): at 23:10

## 2022-05-19 RX ADMIN — PANTOPRAZOLE SODIUM 40 MILLIGRAM(S): 20 TABLET, DELAYED RELEASE ORAL at 16:58

## 2022-05-19 RX ADMIN — PANTOPRAZOLE SODIUM 40 MILLIGRAM(S): 20 TABLET, DELAYED RELEASE ORAL at 07:58

## 2022-05-19 RX ADMIN — ONDANSETRON 4 MILLIGRAM(S): 8 TABLET, FILM COATED ORAL at 00:08

## 2022-05-19 RX ADMIN — Medication 1 GRAM(S): at 00:09

## 2022-05-19 RX ADMIN — Medication 30 MILLILITER(S): at 14:39

## 2022-05-19 RX ADMIN — Medication 650 MILLIGRAM(S): at 23:40

## 2022-05-19 RX ADMIN — Medication 30 MILLILITER(S): at 00:08

## 2022-05-19 NOTE — CONSULT NOTE ADULT - SUBJECTIVE AND OBJECTIVE BOX
Patient is a 51y old  Female who presents with a chief complaint of Weakness (19 May 2022 09:48)    HPI: Patient is a 50 yo F with hx of epilepsy on Topiramate and Raynauds syndrome and a hx of Sinus Bradycardia down to HR 30's bpm few months ago after 1st dose of Pfizer Vaccine s/p Admission to [facility] s/p discharge off Diltiazem (but has resumed it herself) and advised against taking 2nd COVID vaccine/ booster who presents to the ED on 05/18 for evaluation of weakness in setting of recent COVID positive test on 05/09. History goes back to 05/09 when the patient tested positive for COVID at home. She reports a low grade fever last Monday until Wednesday associated with chills, dry cough, nausea, diffuse abdominal discomfort (burning), and watery loose stools (around 5+ times per day since last Monday; now getting softer and less frequent; last had 3 episodes on 05/18 per patient). Since then, she reports progressive weakness and reduced PO intake in the absence of vomiting. She also reports intermittent chest tightness, skipping sensation, and light headedness on exertion. She otherwise denies any recent night sweats, URTI symptoms (rhinorrhea, sore throat), headache. No sick contacts.       PAST MEDICAL & SURGICAL HISTORY:  H/O Raynauds syndrome  Epilepsy      No significant past surgical history    PREVIOUS DIAGNOSTIC TESTING:      ECHO  FINDINGS:    STRESS  FINDINGS:    CATHETERIZATION  FINDINGS:    ELECTROPHYSIOLOGY STUDY  FINDINGS:    CAROTID ULTRASOUND:  FINDINGS    VENOUS DUPLEX SCAN:  FINDINGS:    CHEST CT PULMONARY ANGIO with IV Contrast:  FINDINGS:    MEDICATIONS  (STANDING):  chlorhexidine 4% Liquid 1 Application(s) Topical <User Schedule>  enoxaparin Injectable 40 milliGRAM(s) SubCutaneous every 24 hours  lactated ringers. 1000 milliLiter(s) (100 mL/Hr) IV Continuous <Continuous>  pantoprazole   Suspension 40 milliGRAM(s) Oral two times a day  topiramate 300 milliGRAM(s) Oral daily    MEDICATIONS  (PRN):  aluminum hydroxide/magnesium hydroxide/simethicone Suspension 30 milliLiter(s) Oral every 6 hours PRN Dyspepsia  ondansetron Injectable 4 milliGRAM(s) IV Push every 8 hours PRN Nausea and/or Vomiting  sucralfate 1 Gram(s) Oral four times a day PRN dyspepsia      FAMILY HISTORY:  No pertinent family history in first degree relatives    SOCIAL HISTORY: No smoking, ETOH or illicit drug use    Past Surgical History: None    Allergies:  Originally Entered as [DYSPNEA] reaction to [sari] (Unknown)  penicillin (Other)      REVIEW OF SYSTEMS:  CONSTITUTIONAL: No fever, weight loss, chills, shakes, or fatigue  RESPIRATORY: No cough, wheezing, hemoptysis, or shortness of breath  CARDIOVASCULAR: No chest pain, dyspnea, palpitations, dizziness, syncope, paroxysmal nocturnal dyspnea, orthopnea, or arm or leg swelling  GASTROINTESTINAL: No abdominal  or epigastric pain, nausea, vomiting, hematemesis, diarrhea, constipation, melena or bright red blood.  NEUROLOGICAL: No headaches, memory loss, slurred speech, limb weakness, loss of strength, numbness, or tremors  MUSCULOSKELETAL: No joint pain or swelling, muscle, back, or extremity pain      Vital Signs Last 24 Hrs  T(C): 36.7 (19 May 2022 15:44), Max: 37.1 (19 May 2022 06:07)  T(F): 98 (19 May 2022 15:44), Max: 98.7 (19 May 2022 06:07)  HR: 47 (19 May 2022 15:44) (34 - 47)  BP: 106/64 (19 May 2022 15:44) (91/55 - 117/68)  BP(mean): --  RR: 20 (19 May 2022 15:44) (20 - 21)  SpO2: 100% (19 May 2022 15:44) (98% - 100%)    PHYSICAL EXAM:  GENERAL: In no apparent distress, well nourished, and hydrated.  NECK: Supple, No JVD   HEART: Regular rate and rhythm; No murmurs, rubs, or gallops.  PULMONARY: Clear to auscultation and perfusion.  No rales, wheezing, or rhonchi bilaterally.  EXTREMITIES:  2+ Peripheral Pulses, no LE edema BL  NEUROLOGICAL: Grossly nonfocal      INTERPRETATION OF TELEMETRY: Not currently on telemetry    ECG:  < from: 12 Lead ECG (05.18.22 @ 15:49) >  Ventricular Rate 41 BPM    Atrial Rate 41 BPM    P-R Interval 142 ms    QRS Duration 96 ms    Q-T Interval 474 ms    QTC Calculation(Bazett) 391 ms    P Axis 71 degrees    R Axis 28 degrees    T Axis 62 degrees    Diagnosis Line Marked sinus bradycardia  Abnormal ECG    Confirmed by Thierry Jensen ([id number]) on 5/19/2022 7:48:15 AM    < end of copied text >      I&O's Detail      LABS:                        13.0   3.56  )-----------( 206      ( 19 May 2022 04:30 )             37.1     05-19    142  |  111<H>  |  10  ----------------------------<  83  3.9   |  19  |  0.8    Ca    9.3      19 May 2022 04:30  Phos  3.0     05-19  Mg     1.9     05-19    TPro  6.0  /  Alb  4.0  /  TBili  0.4  /  DBili  x   /  AST  12  /  ALT  10  /  AlkPhos  66  05-19    CARDIAC MARKERS ( 19 May 2022 04:30 )  x     / <0.01 ng/mL / x     / x     / x      CARDIAC MARKERS ( 19 May 2022 01:41 )  x     / <0.01 ng/mL / x     / x     / x      CARDIAC MARKERS ( 18 May 2022 16:28 )  x     / <0.01 ng/mL / x     / x     / x              BNP  I&O's Detail    Daily     Daily     RADIOLOGY & ADDITIONAL STUDIES: Patient is a 51y old  Female who presents with a chief complaint of Weakness (19 May 2022 09:48)    HPI: Patient is a 52 yo F with hx of epilepsy on Topiramate and Raynauds syndrome and a hx of Sinus Bradycardia down to HR 30's bpm few months ago after 1st dose of Pfizer Vaccine s/p Admission to [facility] s/p discharge off Diltiazem (but has resumed it herself) and advised against taking 2nd COVID vaccine/ booster who presents to the ED on 05/18 for evaluation of weakness in setting of recent COVID positive test on 05/09. History goes back to 05/09 when the patient tested positive for COVID at home. She reports a low grade fever last Monday until Wednesday associated with chills, dry cough, nausea, diffuse abdominal discomfort (burning), and watery loose stools (around 5+ times per day since last Monday; now getting softer and less frequent; last had 3 episodes on 05/18 per patient). Since then, she reports progressive weakness and reduced PO intake in the absence of vomiting. She also reports intermittent chest tightness, skipping sensation, and light headedness on exertion. She otherwise denies any recent night sweats, URTI symptoms (rhinorrhea, sore throat), headache. No sick contacts.       PAST MEDICAL & SURGICAL HISTORY:  H/O Raynauds syndrome  Epilepsy      No significant past surgical history    PREVIOUS DIAGNOSTIC TESTING:      ECHO  FINDINGS:    STRESS  FINDINGS:    CATHETERIZATION  FINDINGS:    ELECTROPHYSIOLOGY STUDY  FINDINGS:    CAROTID ULTRASOUND:  FINDINGS    VENOUS DUPLEX SCAN:  FINDINGS:    CHEST CT PULMONARY ANGIO with IV Contrast:  FINDINGS:    MEDICATIONS  (STANDING):  chlorhexidine 4% Liquid 1 Application(s) Topical <User Schedule>  enoxaparin Injectable 40 milliGRAM(s) SubCutaneous every 24 hours  lactated ringers. 1000 milliLiter(s) (100 mL/Hr) IV Continuous <Continuous>  pantoprazole   Suspension 40 milliGRAM(s) Oral two times a day  topiramate 300 milliGRAM(s) Oral daily    MEDICATIONS  (PRN):  aluminum hydroxide/magnesium hydroxide/simethicone Suspension 30 milliLiter(s) Oral every 6 hours PRN Dyspepsia  ondansetron Injectable 4 milliGRAM(s) IV Push every 8 hours PRN Nausea and/or Vomiting  sucralfate 1 Gram(s) Oral four times a day PRN dyspepsia      FAMILY HISTORY:  No pertinent family history in first degree relatives    SOCIAL HISTORY: No smoking, ETOH or illicit drug use    Past Surgical History: None    Allergies:  Originally Entered as [DYSPNEA] reaction to [sari] (Unknown)  penicillin (Other)      REVIEW OF SYSTEMS:  CONSTITUTIONAL: No fever, weight loss, chills, shakes, or fatigue  RESPIRATORY: No cough, wheezing, hemoptysis, or shortness of breath  CARDIOVASCULAR: No chest pain, dyspnea, palpitations, dizziness, syncope, paroxysmal nocturnal dyspnea, orthopnea, or arm or leg swelling  GASTROINTESTINAL: No abdominal  or epigastric pain, nausea, vomiting, hematemesis, diarrhea, constipation, melena or bright red blood.  NEUROLOGICAL: No headaches, memory loss, slurred speech, limb weakness, loss of strength, numbness, or tremors  MUSCULOSKELETAL: No joint pain or swelling, muscle, back, or extremity pain      Vital Signs Last 24 Hrs  T(C): 36.7 (19 May 2022 15:44), Max: 37.1 (19 May 2022 06:07)  T(F): 98 (19 May 2022 15:44), Max: 98.7 (19 May 2022 06:07)  HR: 47 (19 May 2022 15:44) (34 - 47)  BP: 106/64 (19 May 2022 15:44) (91/55 - 117/68)  BP(mean): --  RR: 20 (19 May 2022 15:44) (20 - 21)  SpO2: 100% (19 May 2022 15:44) (98% - 100%)    PHYSICAL EXAM:  Deferred due to COVID      INTERPRETATION OF TELEMETRY: Not currently on telemetry    ECG:  < from: 12 Lead ECG (05.18.22 @ 15:49) >  Ventricular Rate 41 BPM    Atrial Rate 41 BPM    P-R Interval 142 ms    QRS Duration 96 ms    Q-T Interval 474 ms    QTC Calculation(Bazett) 391 ms    P Axis 71 degrees    R Axis 28 degrees    T Axis 62 degrees    Diagnosis Line Marked sinus bradycardia  Abnormal ECG    Confirmed by Thierry Jensen ([id number]) on 5/19/2022 7:48:15 AM    < end of copied text >      I&O's Detail      LABS:                        13.0   3.56  )-----------( 206      ( 19 May 2022 04:30 )             37.1     05-19    142  |  111<H>  |  10  ----------------------------<  83  3.9   |  19  |  0.8    Ca    9.3      19 May 2022 04:30  Phos  3.0     05-19  Mg     1.9     05-19    TPro  6.0  /  Alb  4.0  /  TBili  0.4  /  DBili  x   /  AST  12  /  ALT  10  /  AlkPhos  66  05-19    CARDIAC MARKERS ( 19 May 2022 04:30 )  x     / <0.01 ng/mL / x     / x     / x      CARDIAC MARKERS ( 19 May 2022 01:41 )  x     / <0.01 ng/mL / x     / x     / x      CARDIAC MARKERS ( 18 May 2022 16:28 )  x     / <0.01 ng/mL / x     / x     / x              BNP  I&O's Detail    Daily     Daily     RADIOLOGY & ADDITIONAL STUDIES:

## 2022-05-19 NOTE — CONSULT NOTE ADULT - ATTENDING COMMENTS
Patient seen and examined and agree with above except as noted.  Patients history, notes, labs, imaging, vitals and meds reviewed personally.  Patient has been on topamax for >15 years with no issues and no change in dose.  She says after her first covid vaccine she developed severe reaction and bradycardia.  She currently has covid19 and is having similar bradycardia.  It is unlikely that topamax is giving her new bradycardia as this side effect is typically seen when starting topamax or increasing the dose neither of which is relevant to her presentation.  Her exam is essential non focal.  She is also refusing to change her topamax as she spoke with her neurologist who agreed that topamax was not causing her symptoms.    Plan  1. No indication to change topamax at this time  2. Cardiac/EPS workup  3. If no other causes are found topamax may need to be changed if patient agreeable however would recommend as stated above being done in an EMU setting

## 2022-05-19 NOTE — CONSULT NOTE ADULT - NS ATTEND AMEND GEN_ALL_CORE FT
50 yo F with recent COVID-19 admitted for weakness found to have sinus leidy. Pt on Cardizem at home.     Rec  - Hold AVN blocking agents and monitor on tele  - Check TSH and free T4  - Check 2D echo  - Consult neuro for alternate to Topiramate  - Recommend exercise stress test to r/o ischemia and for chronotropic incompetence

## 2022-05-19 NOTE — CONSULT NOTE ADULT - ASSESSMENT
51 year old female known to have Epilepsy, seizure-free for a long time, follows with Dr Angel. On Topiramate 300mg QD, Raynaud Syndrome.  On Diltiazem 120mg, history of Sinus Bradycardia down to HR 30's bpm few months ago after 1st dose of Pfizer Vaccine s/p Admission to [facility] s/p discharge off Diltiazem (but has resumed it herself) and advised against taking 2nd COVID vaccine/ booster  Admitted under care of medicine team for COVID. Neurology team consulted based on cardiology suggestion to switch Topamax to a different AED given increased risk of bradycardia.    #History of epilepsy  Patient on Topamax monotherapy unclear seizure semiology whether it's  focal (partial) onset seizures vs primary generalized tonic-clonic seizures  Need to obtain prior records from Dr Angel   although it's reported, bradycardia related to Topamax remains rare in the absence of underlying cardiac disease  Agree with Cardiology recc for further ischemic workup. Avoid Cardizem and AVN blocking agents  If patient needs to switched off topiramate, will likely to be done while on VEEG/ EMU admission vs slow tapering over several weeks    Recommendations are not final  Pending Discussion with Attending     51 year old female known to have Epilepsy, seizure-free for a long time, follows with Dr Angel. On Topiramate 300mg QD, Raynaud Syndrome.  On Diltiazem 120mg, history of Sinus Bradycardia down to HR 30's bpm few months ago after 1st dose of Pfizer Vaccine s/p Admission to [facility] s/p discharge off Diltiazem (but has resumed it herself) and advised against taking 2nd COVID vaccine/ booster  Admitted under care of medicine team for COVID. Neurology team consulted based on cardiology suggestion to switch Topamax to a different AED given increased risk of bradycardia.      #History of epilepsy  #Focal partial seizures   Patient on Topamax monotherapy s/p schwannoma resection has been tolerating Topamax very well with no adverse effects  It is less likely that bradycardia is solely attributed to Topamax as patient has been on the same dose for 15 years with no bradycardia   Although it's reported, bradycardia related to Topamax remains rare in the absence of underlying cardiac disease  Agree with Cardiology recc for further ischemic workup. Avoid Cardizem and AVN blocking agents   If all cardiac/medical workup is negative then would discuss alternative with patient.  If needs to be switched off topiramate, will likely to be done while on VEEG/ EMU admission vs slow tapering over several weeks    Recommendations are not final  Pending Discussion with Attending

## 2022-05-19 NOTE — CONSULT NOTE ADULT - ASSESSMENT
Sinus bradycardia, symptomatic  Covid positive    - Monitor on tele  - Check TSH  - TTE ordered per primary team.  - DC cardiazem.  - Consider switching topamax to a different AED as it is known to cause bradycardia.  - After recovery from covid, if persistent symptoms, will need EP f/u to consider PPM application.  - Will discuss plan with attending cardiologist.

## 2022-05-19 NOTE — CONSULT NOTE ADULT - SUBJECTIVE AND OBJECTIVE BOX
Neurology Consult    Ms. Patterson is a 51 year old female known to have: Epilepsy. Seizure-free for a long time. Follows with Dr Angel. On Topiramate 300mg QD, Raynaud Syndrome. Follows with Dr Bullard. On Diltiazem 120mg, History of Sinus Bradycardia down to HR 30's bpm few months ago after 1st dose of Pfizer Vaccine s/p Admission to [facility] s/p discharge off Diltiazem (but has resumed it herself) and advised against taking 2nd COVID vaccine/ booster  She presented to the ED on 05/18 for evaluation of weakness in setting of recent COVID positive test on 05/09.  History goes back to 05/09 when the patient tested positive for COVID at home.  She reports a low grade fever last Monday until Wednesday associated with chills, dry cough, nausea, diffuse abdominal discomfort (burning), and watery loose stools (around 5+ times per day since last Monday; now getting softer and less frequent; last had 3 episodes on 05/18 per patient).  Since then, she reports progressive weakness and reduced PO intake in the absence of vomiting.  On review of systems, patient reports a recent UTI last month s/p 7-10 day of an AB course (couldn't recall name).  She also reports intermittent chest tightness, skipping sensation, and light headedness on exertion.  She otherwise denies any recent night sweats, URTI symptoms (rhinorrhea, sore throat), headache.   No sick contacts.   No recent travel or exposure to recent travelers.    Neurology team consulted based on cardiology suggestion to switch Topamax to a different AED given increased risk of bradycardia.  Patient seen and examined, she reports       PAST MEDICAL & SURGICAL HISTORY:  H/O Raynaud&#x27;s syndrome      Epilepsy      No significant past surgical history          FAMILY HISTORY:  No pertinent family history in first degree relatives        Social History: (-) x 3    Allergies    Originally Entered as [DYSPNEA] reaction to [sari] (Unknown)  penicillin (Other)    Intolerances        MEDICATIONS  (STANDING):  chlorhexidine 4% Liquid 1 Application(s) Topical <User Schedule>  enoxaparin Injectable 40 milliGRAM(s) SubCutaneous every 24 hours  lactated ringers. 1000 milliLiter(s) (100 mL/Hr) IV Continuous <Continuous>  pantoprazole   Suspension 40 milliGRAM(s) Oral two times a day  topiramate 300 milliGRAM(s) Oral daily    MEDICATIONS  (PRN):  aluminum hydroxide/magnesium hydroxide/simethicone Suspension 30 milliLiter(s) Oral every 6 hours PRN Dyspepsia  ondansetron Injectable 4 milliGRAM(s) IV Push every 8 hours PRN Nausea and/or Vomiting  sucralfate 1 Gram(s) Oral four times a day PRN dyspepsia      Review of systems:    Constitutional: as per HPI  Eyes: No eye pain or discharge  ENMT:  No difficulty hearing; No sinus or throat pain  Neck: No pain or stiffness  Respiratory: No cough, wheezing, chills or hemoptysis  Cardiovascular: No chest pain, palpitations, shortness of breath, dyspnea on exertion  Gastrointestinal: No abdominal pain, nausea, vomiting or hematemesis; No diarrhea or constipation.   Genitourinary: No dysuria, frequency, hematuria or incontinence  Neurological: As per HPI  Skin: No rashes or lesions   Endocrine: No heat or cold intolerance; No hair loss  Musculoskeletal: No joint pain or swelling  Psychiatric: No depression, anxiety, mood swings  Heme/Lymph: No easy bruising or bleeding gums    Vital Signs Last 24 Hrs  T(C): 36.7 (19 May 2022 15:44), Max: 37.1 (19 May 2022 06:07)  T(F): 98 (19 May 2022 15:44), Max: 98.7 (19 May 2022 06:07)  HR: 47 (19 May 2022 15:44) (34 - 47)  BP: 106/64 (19 May 2022 15:44) (91/55 - 117/68)  BP(mean): --  RR: 20 (19 May 2022 15:44) (20 - 21)  SpO2: 100% (19 May 2022 15:44) (98% - 100%)      Neurological Examination:  General:  Appearance is consistent with chronologic age.  No abnormal facies.  Gross skin survey within normal limits.    Cognitive/Language:  Awake, alert, and oriented to person, place, time and date.  Recent and remote memory intact.  Fund of knowledge is appropriate.  Naming, repetition and comprehension intact.   Nondysarthric.    Cranial Nerves  - Eyes: Visual acuity intact, Visual fields full.  EOMI w/o nystagmus, skew or reported double vision.  PERRL.  No ptosis/weakness of eyelid closure.    - Face:  Facial sensation normal V1 - 3, no facial asymmetry.    - Ears/Nose/Throat:  Hearing grossly intact b/l to finger rub.  Palate elevates midline.  Tongue and uvula midline.   Motor examination:  (MRC grade R/L) 5/5 UE; 5/5 LE.  No observable drift. Normal tone and bulk. No tenderness, twitching, tremors or involuntary movements.  Sensory examination:   Intact to light touch and pinprick, pain, temperature and proprioception and vibration in all extremities.  Reflexes:   2+ b/l biceps, triceps, brachioradialis, patella and achilles.  Plantar response downgoing b/l.  Jaw jerk, Gabriele, clonus absent.  Cerebellum:   FTN/HKS intact.  No dysmetria or dysdiadokinesia.  Gait narrow based and normal.      Labs:   CBC Full  -  ( 19 May 2022 04:30 )  WBC Count : 3.56 K/uL  RBC Count : 3.78 M/uL  Hemoglobin : 13.0 g/dL  Hematocrit : 37.1 %  Platelet Count - Automated : 206 K/uL  Mean Cell Volume : 98.1 fL  Mean Cell Hemoglobin : 34.4 pg  Mean Cell Hemoglobin Concentration : 35.0 g/dL  Auto Neutrophil # : 1.66 K/uL  Auto Lymphocyte # : 1.53 K/uL  Auto Monocyte # : 0.31 K/uL  Auto Eosinophil # : 0.03 K/uL  Auto Basophil # : 0.02 K/uL  Auto Neutrophil % : 46.6 %  Auto Lymphocyte % : 43.0 %  Auto Monocyte % : 8.7 %  Auto Eosinophil % : 0.8 %  Auto Basophil % : 0.6 %    05-19    142  |  111<H>  |  10  ----------------------------<  83  3.9   |  19  |  0.8    Ca    9.3      19 May 2022 04:30  Phos  3.0     05-19  Mg     1.9     05-19    TPro  6.0  /  Alb  4.0  /  TBili  0.4  /  DBili  x   /  AST  12  /  ALT  10  /  AlkPhos  66  05-19    LIVER FUNCTIONS - ( 19 May 2022 04:30 )  Alb: 4.0 g/dL / Pro: 6.0 g/dL / ALK PHOS: 66 U/L / ALT: 10 U/L / AST: 12 U/L / GGT: x                   Neuroimaging:  N[facility]:     05-19-22 @ 19:49       Neurology Consult    Ms. Patterson is a 51 year old female known to have: Epilepsy. Seizure-free for a long time. Follows with Dr Angel. On Topiramate 300mg QD, Raynaud Syndrome. Follows with Dr Bullard. On Diltiazem 120mg, History of Sinus Bradycardia down to HR 30's bpm few months ago after 1st dose of Pfizer Vaccine s/p Admission to [facility] s/p discharge off Diltiazem (but has resumed it herself) and advised against taking 2nd COVID vaccine/ booster  She presented to the ED on 05/18 for evaluation of weakness in setting of recent COVID positive test on 05/09.  History goes back to 05/09 when the patient tested positive for COVID at home.  She reports a low grade fever last Monday until Wednesday associated with chills, dry cough, nausea, diffuse abdominal discomfort (burning), and watery loose stools (around 5+ times per day since last Monday; now getting softer and less frequent; last had 3 episodes on 05/18 per patient).  Since then, she reports progressive weakness and reduced PO intake in the absence of vomiting.  On review of systems, patient reports a recent UTI last month s/p 7-10 day of an AB course (couldn't recall name).  She also reports intermittent chest tightness, skipping sensation, and light headedness on exertion.  She otherwise denies any recent night sweats, URTI symptoms (rhinorrhea, sore throat), headache.   No sick contacts.   No recent travel or exposure to recent travelers.    Neurology team consulted based on cardiology suggestion to switch Topamax to a different AED given increased risk of bradycardia.  Patient seen and examined, she reports having seizures since 1999 after she had schwannoma       PAST MEDICAL & SURGICAL HISTORY:  H/O Raynaud&#x27;s syndrome      Epilepsy      No significant past surgical history          FAMILY HISTORY:  No pertinent family history in first degree relatives        Social History: (-) x 3    Allergies    Originally Entered as [DYSPNEA] reaction to [sari] (Unknown)  penicillin (Other)    Intolerances        MEDICATIONS  (STANDING):  chlorhexidine 4% Liquid 1 Application(s) Topical <User Schedule>  enoxaparin Injectable 40 milliGRAM(s) SubCutaneous every 24 hours  lactated ringers. 1000 milliLiter(s) (100 mL/Hr) IV Continuous <Continuous>  pantoprazole   Suspension 40 milliGRAM(s) Oral two times a day  topiramate 300 milliGRAM(s) Oral daily    MEDICATIONS  (PRN):  aluminum hydroxide/magnesium hydroxide/simethicone Suspension 30 milliLiter(s) Oral every 6 hours PRN Dyspepsia  ondansetron Injectable 4 milliGRAM(s) IV Push every 8 hours PRN Nausea and/or Vomiting  sucralfate 1 Gram(s) Oral four times a day PRN dyspepsia      Review of systems:    Constitutional: as per HPI  Eyes: No eye pain or discharge  ENMT:  No difficulty hearing; No sinus or throat pain  Neck: No pain or stiffness  Respiratory: No cough, wheezing, chills or hemoptysis  Cardiovascular: No chest pain, palpitations, shortness of breath, dyspnea on exertion  Gastrointestinal: No abdominal pain, nausea, vomiting or hematemesis; No diarrhea or constipation.   Genitourinary: No dysuria, frequency, hematuria or incontinence  Neurological: As per HPI  Skin: No rashes or lesions   Endocrine: No heat or cold intolerance; No hair loss  Musculoskeletal: No joint pain or swelling  Psychiatric: No depression, anxiety, mood swings  Heme/Lymph: No easy bruising or bleeding gums    Vital Signs Last 24 Hrs  T(C): 36.7 (19 May 2022 15:44), Max: 37.1 (19 May 2022 06:07)  T(F): 98 (19 May 2022 15:44), Max: 98.7 (19 May 2022 06:07)  HR: 47 (19 May 2022 15:44) (34 - 47)  BP: 106/64 (19 May 2022 15:44) (91/55 - 117/68)  BP(mean): --  RR: 20 (19 May 2022 15:44) (20 - 21)  SpO2: 100% (19 May 2022 15:44) (98% - 100%)      Neurological Examination:  General:  Appearance is consistent with chronologic age.  No abnormal facies.  Gross skin survey within normal limits.    Cognitive/Language:  Awake, alert, and oriented to person, place, time and date.  Recent and remote memory intact.  Fund of knowledge is appropriate.  Naming, repetition and comprehension intact.   Nondysarthric.    Cranial Nerves  - Eyes: Visual acuity intact, Visual fields full.  EOMI w/o nystagmus, skew or reported double vision.  PERRL.  No ptosis/weakness of eyelid closure.    - Face:  Facial sensation normal V1 - 3, no facial asymmetry.    - Ears/Nose/Throat:  Hearing grossly intact b/l to finger rub.  Palate elevates midline.  Tongue and uvula midline.   Motor examination:  (MRC grade R/L) 5/5 UE; 5/5 LE.  No observable drift. Normal tone and bulk. No tenderness, twitching, tremors or involuntary movements.  Sensory examination:   Intact to light touch and pinprick, pain, temperature and proprioception and vibration in all extremities.  Reflexes:   2+ b/l biceps, triceps, brachioradialis, patella and achilles.  Plantar response downgoing b/l.  Jaw jerk, Gabriele, clonus absent.  Cerebellum:   FTN/HKS intact.  No dysmetria or dysdiadokinesia.  Gait narrow based and normal.      Labs:   CBC Full  -  ( 19 May 2022 04:30 )  WBC Count : 3.56 K/uL  RBC Count : 3.78 M/uL  Hemoglobin : 13.0 g/dL  Hematocrit : 37.1 %  Platelet Count - Automated : 206 K/uL  Mean Cell Volume : 98.1 fL  Mean Cell Hemoglobin : 34.4 pg  Mean Cell Hemoglobin Concentration : 35.0 g/dL  Auto Neutrophil # : 1.66 K/uL  Auto Lymphocyte # : 1.53 K/uL  Auto Monocyte # : 0.31 K/uL  Auto Eosinophil # : 0.03 K/uL  Auto Basophil # : 0.02 K/uL  Auto Neutrophil % : 46.6 %  Auto Lymphocyte % : 43.0 %  Auto Monocyte % : 8.7 %  Auto Eosinophil % : 0.8 %  Auto Basophil % : 0.6 %    05-19    142  |  111<H>  |  10  ----------------------------<  83  3.9   |  19  |  0.8    Ca    9.3      19 May 2022 04:30  Phos  3.0     05-19  Mg     1.9     05-19    TPro  6.0  /  Alb  4.0  /  TBili  0.4  /  DBili  x   /  AST  12  /  ALT  10  /  AlkPhos  66  05-19    LIVER FUNCTIONS - ( 19 May 2022 04:30 )  Alb: 4.0 g/dL / Pro: 6.0 g/dL / ALK PHOS: 66 U/L / ALT: 10 U/L / AST: 12 U/L / GGT: x                   Neuroimaging:  [facility]:     05-19-22 @ 19:49       Neurology Consult    Ms. Patterson is a 51 year old female known to have: Epilepsy. Seizure-free for a long time. Follows with Dr Angel. On Topiramate 300mg QD, Raynaud Syndrome. Follows with Dr Bullard. On Diltiazem 120mg, History of Sinus Bradycardia down to HR 30's bpm few months ago after 1st dose of Pfizer Vaccine s/p Admission to [facility] s/p discharge off Diltiazem (but has resumed it herself) and advised against taking 2nd COVID vaccine/ booster  She presented to the ED on 05/18 for evaluation of weakness in setting of recent COVID positive test on 05/09.  History goes back to 05/09 when the patient tested positive for COVID at home.  She reports a low grade fever last Monday until Wednesday associated with chills, dry cough, nausea, diffuse abdominal discomfort (burning), and watery loose stools (around 5+ times per day since last Monday; now getting softer and less frequent; last had 3 episodes on 05/18 per patient).  Since then, she reports progressive weakness and reduced PO intake in the absence of vomiting.  On review of systems, patient reports a recent UTI last month s/p 7-10 day of an AB course (couldn't recall name).  She also reports intermittent chest tightness, skipping sensation, and light headedness on exertion.  She otherwise denies any recent night sweats, URTI symptoms (rhinorrhea, sore throat), headache.   No sick contacts.   No recent travel or exposure to recent travelers.    Neurology team consulted based on cardiology suggestion to switch Topamax to a different AED given increased risk of bradycardia.  Patient seen and examined, she reports having seizures since 1999 after she had schwannoma s/p resection Had been on multiple AED before, and then was on Topamax for the last 15 years with good control of her seizures.  She reports that she has been tolerating Topamax with no side effects and had no issues with her HR until recently after she got the Pfizer vaccine. She also reports that she has been advised to stop/ decreased Topamax after being dc from [facility] which she discussed with her neurologist Dr Angel and advised against.  Patient believes that her bradycardia is not related to Topamax as she tolerated the medicine for the last 15 years with no issues.        PAST MEDICAL & SURGICAL HISTORY:  H/O Raynaud&#x27;s syndrome      Epilepsy      No significant past surgical history          FAMILY HISTORY:  No pertinent family history in first degree relatives        Social History: (-) x 3    Allergies    Originally Entered as [DYSPNEA] reaction to [sari] (Unknown)  penicillin (Other)    Intolerances        MEDICATIONS  (STANDING):  chlorhexidine 4% Liquid 1 Application(s) Topical <User Schedule>  enoxaparin Injectable 40 milliGRAM(s) SubCutaneous every 24 hours  lactated ringers. 1000 milliLiter(s) (100 mL/Hr) IV Continuous <Continuous>  pantoprazole   Suspension 40 milliGRAM(s) Oral two times a day  topiramate 300 milliGRAM(s) Oral daily    MEDICATIONS  (PRN):  aluminum hydroxide/magnesium hydroxide/simethicone Suspension 30 milliLiter(s) Oral every 6 hours PRN Dyspepsia  ondansetron Injectable 4 milliGRAM(s) IV Push every 8 hours PRN Nausea and/or Vomiting  sucralfate 1 Gram(s) Oral four times a day PRN dyspepsia      Review of systems:    Constitutional: as per HPI  Eyes: No eye pain or discharge  ENMT:  No difficulty hearing; No sinus or throat pain  Neck: No pain or stiffness  Respiratory: No cough, wheezing, chills or hemoptysis  Cardiovascular: No chest pain, palpitations, shortness of breath, dyspnea on exertion  Gastrointestinal: No abdominal pain, nausea, vomiting or hematemesis; No diarrhea or constipation.   Genitourinary: No dysuria, frequency, hematuria or incontinence  Neurological: As per HPI  Skin: No rashes or lesions   Endocrine: No heat or cold intolerance; No hair loss  Musculoskeletal: No joint pain or swelling  Psychiatric: No depression, anxiety, mood swings  Heme/Lymph: No easy bruising or bleeding gums    Vital Signs Last 24 Hrs  T(C): 36.7 (19 May 2022 15:44), Max: 37.1 (19 May 2022 06:07)  T(F): 98 (19 May 2022 15:44), Max: 98.7 (19 May 2022 06:07)  HR: 47 (19 May 2022 15:44) (34 - 47)  BP: 106/64 (19 May 2022 15:44) (91/55 - 117/68)  BP(mean): --  RR: 20 (19 May 2022 15:44) (20 - 21)  SpO2: 100% (19 May 2022 15:44) (98% - 100%)      Neurological Examination:  General:  Appearance is consistent with chronologic age.  No abnormal facies.  Gross skin survey within normal limits.    Cognitive/Language:  Awake, alert, and oriented to person, place, time and date.  Recent and remote memory intact.  Fund of knowledge is appropriate.  Naming, repetition and comprehension intact.   Nondysarthric.    Cranial Nerves  - Eyes: Visual acuity intact, Visual fields full.  EOMI w/o nystagmus, skew or reported double vision.  PERRL.  No ptosis/weakness of eyelid closure.    - Face:  Facial sensation normal V1 - 3, no facial asymmetry.    - Ears/Nose/Throat:  Hearing grossly intact b/l to finger rub.  Palate elevates midline.  Tongue and uvula midline.   Motor examination:  (MRC grade R/L) 5/5 UE; 5/5 LE.  No observable drift. Normal tone and bulk. No tenderness, twitching, tremors or involuntary movements.  Sensory examination:   Intact to light touch and pinprick, pain, temperature and proprioception and vibration in all extremities.  Reflexes:   2+ b/l biceps, triceps, brachioradialis, patella and achilles.  Plantar response downgoing b/l.  Jaw jerk, Gabriele, clonus absent.  Cerebellum:   FTN/HKS intact.  No dysmetria or dysdiadokinesia.  Gait deferred    Labs:   CBC Full  -  ( 19 May 2022 04:30 )  WBC Count : 3.56 K/uL  RBC Count : 3.78 M/uL  Hemoglobin : 13.0 g/dL  Hematocrit : 37.1 %  Platelet Count - Automated : 206 K/uL  Mean Cell Volume : 98.1 fL  Mean Cell Hemoglobin : 34.4 pg  Mean Cell Hemoglobin Concentration : 35.0 g/dL  Auto Neutrophil # : 1.66 K/uL  Auto Lymphocyte # : 1.53 K/uL  Auto Monocyte # : 0.31 K/uL  Auto Eosinophil # : 0.03 K/uL  Auto Basophil # : 0.02 K/uL  Auto Neutrophil % : 46.6 %  Auto Lymphocyte % : 43.0 %  Auto Monocyte % : 8.7 %  Auto Eosinophil % : 0.8 %  Auto Basophil % : 0.6 %    05-19    142  |  111<H>  |  10  ----------------------------<  83  3.9   |  19  |  0.8    Ca    9.3      19 May 2022 04:30  Phos  3.0     05-19  Mg     1.9     05-19    TPro  6.0  /  Alb  4.0  /  TBili  0.4  /  DBili  x   /  AST  12  /  ALT  10  /  AlkPhos  66  05-19    LIVER FUNCTIONS - ( 19 May 2022 04:30 )  Alb: 4.0 g/dL / Pro: 6.0 g/dL / ALK PHOS: 66 U/L / ALT: 10 U/L / AST: 12 U/L / GGT: x                   Neuroimaging:  [facility]:     None available

## 2022-05-19 NOTE — CONSULT NOTE ADULT - ASSESSMENT
Assessment: 51 year old female patient with Epilepsy and Raynaud Phenomenon who presented to the ED on 05/18 for evaluation of weakness in setting of recent COVID positive test at home, fever, chills, diarrhea, and cough, found to have sinus bradycardia but otherwise insignificant chest and abdominal imaging, to be admitted for further investigations, management, and monitoring. Currently still in sinus bradycardia around 50bpm.    Impression:  Sinus Bradycardia  COVID (+)  Generalized Weakness  Epilepsy on Topiramate  Raynaud's    Plan:  - Hold AVN blocking agents and continue tele monitoring  - If patient continues to be leidy off AVN agents will discuss option of PPM  - Consult neuro to switch Topiramate to another epileptic medication as this can also cause bradycardia  - 2D Echo  - Recommend exercise stress test to r/o ischemia and for chronotropic incompetence  - Cont tele monitoring  - Monitor electrolytes, maintain WNL  - Will follow

## 2022-05-19 NOTE — CONSULT NOTE ADULT - ATTENDING COMMENTS
Patient seen, case d/w cardiology fellow and medical resident.  Agree with assessment and recommendations.  Covid infection.  Bradycardia due to Cardizem, on hold now.  Amlodipine can be started for treatment of Raynaud's phenomenon (as outpatient).  Continue cardiac monitoring.  TSH pending.

## 2022-05-19 NOTE — CONSULT NOTE ADULT - SUBJECTIVE AND OBJECTIVE BOX
CHANDRIKA SHULTZ  51y, Female  Allergy: Originally Entered as [DYSPNEA] reaction to [sari] (Unknown)  penicillin (Other)      CHIEF COMPLAINT:   Weakness (19 May 2022 08:33)      LOS  1d    HPI  HPI:  History of Present Illness  Ms. Shultz is a 51 year old female known to have:  - Epilepsy. Seizure-free for a long time. Follows with Dr Angel. On Topiramate 300mg QD  - Raynaud Syndrome. Follows with Dr Bullard. On Diltiazem 120mg   - History of Sinus Bradycardia down to HR 30's bpm few months ago after 1st dose of Pfizer Vaccine s/p Admission to [facility] s/p discharge off Diltiazem (but has resumed it herself) and advised against taking 2nd COVID vaccine/ booster      She presented to the ED on  for evaluation of weakness in setting of recent COVID positive test on .  History goes back to  when the patient tested positive for COVID at home.  She reports a low grade fever last Monday until Wednesday associated with chills, dry cough, nausea, diffuse abdominal discomfort (burning), and watery loose stools (around 5+ times per day since last Monday; now getting softer and less frequent; last had 3 episodes on  per patient).  Since then, she reports progressive weakness and reduced PO intake in the absence of vomiting.      On review of systems, patient reports a recent UTI last month s/p 7-10 day of an AB course (couldn't recall name).  She also reports intermittent chest tightness, skipping sensation, and light headedness on exertion.  She otherwise denies any recent night sweats, URTI symptoms (rhinorrhea, sore throat), headache.   No sick contacts.   No recent travel or exposure to recent travelers.     (18 May 2022 21:27)      INFECTIOUS DISEASE HISTORY:  ID consulted for COVID19 satting well on RA< CXR NEGATIVE      PMH  PAST MEDICAL & SURGICAL HISTORY:  H/O Raynaud&#x27;s syndrome      Epilepsy          FAMILY HISTORY  non-contributory     SOCIAL HISTORY  Social History:  Please refer to above for more details (18 May 2022 21:27)        ROS  ***    VITALS:  T(F): 98.2, Max: 98.7 (22 @ 06:07)  HR: 34  BP: 113/66  RR: 21Vital Signs Last 24 Hrs  T(C): 36.8 (19 May 2022 06:55), Max: 37.1 (19 May 2022 06:07)  T(F): 98.2 (19 May 2022 06:55), Max: 98.7 (19 May 2022 06:07)  HR: 34 (19 May 2022 06:55) (34 - 79)  BP: 113/66 (19 May 2022 06:55) (91/55 - 117/68)  BP(mean): --  RR: 21 (19 May 2022 06:55) (17 - 21)  SpO2: 98% (19 May 2022 06:55) (98% - 99%)    PHYSICAL EXAM:  ***    TESTS & MEASUREMENTS:                        13.0   3.56  )-----------( 206      ( 19 May 2022 04:30 )             37.1         146  |  110  |  12  ----------------------------<  96  5.2<H>   |  23  |  0.9    Ca    9.7      18 May 2022 16:28  Mg     2.2         TPro  7.3  /  Alb  4.6  /  TBili  0.4  /  DBili  x   /  AST  27  /  ALT  14  /  AlkPhos  72  -      LIVER FUNCTIONS - ( 18 May 2022 16:28 )  Alb: 4.6 g/dL / Pro: 7.3 g/dL / ALK PHOS: 72 U/L / ALT: 14 U/L / AST: 27 U/L / GGT: x                     INFECTIOUS DISEASES TESTING  COVID-19 PCR: Detected (22 @ 18:48)      INFLAMMATORY MARKERS      RADIOLOGY & ADDITIONAL TESTS:  I have personally reviewed the last Chest xray  CXR  Xray Chest 1 View- PORTABLE-Urgent:   ACC: [id number] EXAM:  XR CHEST PORTABLE URGENT 1V                          PROCEDURE DATE:  2022          INTERPRETATION:  Clinical History / Reason for exam: Chest tightness    Comparison : Chest radiograph dated 2016.    Technique/Positionin frontal images of the chest were obtained.    Findings:    Support devices: None.    Cardiac/mediastinum/hilum: Unremarkable.    Lung parenchyma/Pleura: No focal consolidation, pleural effusion or   pneumothorax.    Skeleton/soft tissues: Unremarkable.    Impression:    No radiographic evidence of acute cardiopulmonary disease.        --- End of Report ---            LEA ROJAS MD; Attending Radiologist  This document has been electronically signed. May 18 2022  4:34PM (22 @ 16:08)      CT  CT Abdomen and Pelvis No Cont:   ACC: [id number] EXAM:  CT ABDOMEN AND PELVIS                          PROCEDURE DATE:  2022          INTERPRETATION:  CLINICAL STATEMENT: Diffuse tenderness    TECHNIQUE: Contiguous axial CT images were obtained from the lower chest   to the pubic symphysis without intravenous contrast.  Oral contrast was   not administered.  Reformatted images in the coronal and sagittal planes   were acquired.    COMPARISON CT: Report of abdomen and pelvis CT from 2015    OTHER STUDIES USED FOR CORRELATION: None.      FINDINGS: Limited evaluation due to the lack of intravenous contrast.    LOWER CHEST: Bibasilar atelectasis.    HEPATOBILIARY: Subcentimeter left hepatic lobe hypodensity is too small   to characterize.    SPLEEN: Unremarkable.    PANCREAS: Unremarkable.    ADRENAL GLANDS: Unremarkable.    KIDNEYS: 8 mm left renal hypodensity, incompletely visualized but could   represent a small cyst. No stones or hydronephrosis    ABDOMINOPELVIC NODES: Unremarkable.    PELVIC ORGANS: Unremarkable.    PERITONEUM/MESENTERY/BOWEL: No bowel obstruction, free fluid, or free   air. Appendix is not clearly visualized.    BONES/SOFT TISSUES: Unremarkable.      IMPRESSION:    No definite acute abnormality in the abdomen and pelvis.    --- End of Report ---            ONUR WARREN MD; Attending Radiologist  This document has been electronically signed. May 18 2022  5:37PM (22 @ 17:27)      CARDIOLOGY TESTING  12 Lead ECG:   Ventricular Rate 41 BPM    Atrial Rate 41 BPM    P-R Interval 142 ms    QRS Duration 96 ms    Q-T Interval 474 ms    QTC Calculation(Bazett) 391 ms    P Axis 71 degrees    R Axis 28 degrees    T Axis 62 degrees    Diagnosis Line Marked sinus bradycardia  Abnormal ECG    Confirmed by Thierry Jensen ([id number]) on 2022 7:48:15 AM (22 @ 15:49)  12 Lead ECG:   Ventricular Rate 59 BPM    Atrial Rate 59 BPM    P-R Interval 128 ms    QRS Duration 90 ms    Q-T Interval 420 ms    QTC Calculation(Bazett) 415 ms    P Axis 77 degrees    R Axis 85 degrees    T Axis 70 degrees    Diagnosis Line Sinus bradycardia  Possible Left atrial enlargement  RSR' or QR pattern in V1 suggests right ventricular conduction delay  Anterior infarct , age undetermined  Abnormal ECG    Confirmed by Thierry Jensen ([id number]) on 2022 7:47:27 AM (22 @ 14:15)      MEDICATIONS  chlorhexidine 4% Liquid 1 Topical <User Schedule>  enoxaparin Injectable 40 SubCutaneous every 24 hours  lactated ringers. 1000 IV Continuous <Continuous>  pantoprazole    Tablet 40 Oral before breakfast  topiramate 300 Oral daily        ANTIBIOTICS:      ALLERGIES:  Originally Entered as [DYSPNEA] reaction to [sari] (Unknown)  penicillin (Other)           CHANDRIKA SHULTZ  51y, Female  Allergy: Originally Entered as [DYSPNEA] reaction to [sari] (Unknown)  penicillin (Other)      CHIEF COMPLAINT:   Weakness (19 May 2022 08:33)      LOS  1d    HPI  HPI:  History of Present Illness  Ms. Shultz is a 51 year old female known to have:  - Epilepsy. Seizure-free for a long time. Follows with Dr Angel. On Topiramate 300mg QD  - Raynaud Syndrome. Follows with Dr Bullard. On Diltiazem 120mg   - History of Sinus Bradycardia down to HR 30's bpm few months ago after 1st dose of Pfizer Vaccine s/p Admission to [facility] s/p discharge off Diltiazem (but has resumed it herself) and advised against taking 2nd COVID vaccine/ booster      She presented to the ED on  for evaluation of weakness in setting of recent COVID positive test on .  History goes back to  when the patient tested positive for COVID at home.  She reports a low grade fever last Monday until Wednesday associated with chills, dry cough, nausea, diffuse abdominal discomfort (burning), and watery loose stools (around 5+ times per day since last Monday; now getting softer and less frequent; last had 3 episodes on  per patient).  Since then, she reports progressive weakness and reduced PO intake in the absence of vomiting.      On review of systems, patient reports a recent UTI last month s/p 7-10 day of an AB course (couldn't recall name).  She also reports intermittent chest tightness, skipping sensation, and light headedness on exertion.  She otherwise denies any recent night sweats, URTI symptoms (rhinorrhea, sore throat), headache.   No sick contacts.   No recent travel or exposure to recent travelers.     (18 May 2022 21:27)      INFECTIOUS DISEASE HISTORY:  ID consulted for COVID19 satting well on RA< CXR NEGATIVE    diagnosed 5  bradycardia    PMH  PAST MEDICAL & SURGICAL HISTORY:  H/O Raynaud&#x27;s syndrome      Epilepsy          FAMILY HISTORY  non-contributory     SOCIAL HISTORY  Social History:  Please refer to above for more details (18 May 2022 21:27)        ROS  General: Denies rigors, nightsweats  HEENT: Denies headache, rhinorrhea, sore throat, eye pain  CV: as noted above   PULM: Denies wheezing, hemoptysis  GI: Denies hematemesis, hematochezia, melena  : Denies discharge, hematuria  MSK: Denies arthralgias, myalgias  SKIN: Denies rash, lesions  NEURO: Denies paresthesias, weakness  PSYCH: Denies depression, anxiety     VITALS:  T(F): 98.2, Max: 98.7 (22 @ 06:07)  HR: 34  BP: 113/66  RR: 21Vital Signs Last 24 Hrs  T(C): 36.8 (19 May 2022 06:55), Max: 37.1 (19 May 2022 06:07)  T(F): 98.2 (19 May 2022 06:55), Max: 98.7 (19 May 2022 06:07)  HR: 34 (19 May 2022 06:55) (34 - 79)  BP: 113/66 (19 May 2022 06:55) (91/55 - 117/68)  BP(mean): --  RR: 21 (19 May 2022 06:55) (17 - 21)  SpO2: 98% (19 May 2022 06:55) (98% - 99%)    PHYSICAL EXAM:  Gen: NAD, resting in bed  HEENT: Normocephalic, atraumatic  Neck: supple, no lymphadenopathy  CV: leidy  Lungs: decreased BS at bases, no fremitus  Abdomen: Soft, BS present  Ext: Warm, well perfused  Neuro: non focal, awake  Skin: no rash, no erythema  Lines: no phlebitis     TESTS & MEASUREMENTS:                        13.0   3.56  )-----------( 206      ( 19 May 2022 04:30 )             37.1         146  |  110  |  12  ----------------------------<  96  5.2<H>   |  23  |  0.9    Ca    9.7      18 May 2022 16:28  Mg     2.2         TPro  7.3  /  Alb  4.6  /  TBili  0.4  /  DBili  x   /  AST  27  /  ALT  14  /  AlkPhos  72  18      LIVER FUNCTIONS - ( 18 May 2022 16:28 )  Alb: 4.6 g/dL / Pro: 7.3 g/dL / ALK PHOS: 72 U/L / ALT: 14 U/L / AST: 27 U/L / GGT: x                     INFECTIOUS DISEASES TESTING  COVID-19 PCR: Detected (22 @ 18:48)      INFLAMMATORY MARKERS      RADIOLOGY & ADDITIONAL TESTS:  I have personally reviewed the last Chest xray  CXR  Xray Chest 1 View- PORTABLE-Urgent:   ACC: [id number] EXAM:  XR CHEST PORTABLE URGENT 1V                          PROCEDURE DATE:  2022          INTERPRETATION:  Clinical History / Reason for exam: Chest tightness    Comparison : Chest radiograph dated 2016.    Technique/Positionin frontal images of the chest were obtained.    Findings:    Support devices: None.    Cardiac/mediastinum/hilum: Unremarkable.    Lung parenchyma/Pleura: No focal consolidation, pleural effusion or   pneumothorax.    Skeleton/soft tissues: Unremarkable.    Impression:    No radiographic evidence of acute cardiopulmonary disease.        --- End of Report ---            LEA ROJAS MD; Attending Radiologist  This document has been electronically signed. May 18 2022  4:34PM (22 @ 16:08)      CT  CT Abdomen and Pelvis No Cont:   ACC: [id number] EXAM:  CT ABDOMEN AND PELVIS                          PROCEDURE DATE:  2022          INTERPRETATION:  CLINICAL STATEMENT: Diffuse tenderness    TECHNIQUE: Contiguous axial CT images were obtained from the lower chest   to the pubic symphysis without intravenous contrast.  Oral contrast was   not administered.  Reformatted images in the coronal and sagittal planes   were acquired.    COMPARISON CT: Report of abdomen and pelvis CT from 2015    OTHER STUDIES USED FOR CORRELATION: None.      FINDINGS: Limited evaluation due to the lack of intravenous contrast.    LOWER CHEST: Bibasilar atelectasis.    HEPATOBILIARY: Subcentimeter left hepatic lobe hypodensity is too small   to characterize.    SPLEEN: Unremarkable.    PANCREAS: Unremarkable.    ADRENAL GLANDS: Unremarkable.    KIDNEYS: 8 mm left renal hypodensity, incompletely visualized but could   represent a small cyst. No stones or hydronephrosis    ABDOMINOPELVIC NODES: Unremarkable.    PELVIC ORGANS: Unremarkable.    PERITONEUM/MESENTERY/BOWEL: No bowel obstruction, free fluid, or free   air. Appendix is not clearly visualized.    BONES/SOFT TISSUES: Unremarkable.      IMPRESSION:    No definite acute abnormality in the abdomen and pelvis.    --- End of Report ---            ONUR WARREN MD; Attending Radiologist  This document has been electronically signed. May 18 2022  5:37PM (05-18-22 @ 17:27)      CARDIOLOGY TESTING  12 Lead ECG:   Ventricular Rate 41 BPM    Atrial Rate 41 BPM    P-R Interval 142 ms    QRS Duration 96 ms    Q-T Interval 474 ms    QTC Calculation(Bazett) 391 ms    P Axis 71 degrees    R Axis 28 degrees    T Axis 62 degrees    Diagnosis Line Marked sinus bradycardia  Abnormal ECG    Confirmed by Thierry Jensen ([id number]) on 2022 7:48:15 AM (22 @ 15:49)  12 Lead ECG:   Ventricular Rate 59 BPM    Atrial Rate 59 BPM    P-R Interval 128 ms    QRS Duration 90 ms    Q-T Interval 420 ms    QTC Calculation(Bazett) 415 ms    P Axis 77 degrees    R Axis 85 degrees    T Axis 70 degrees    Diagnosis Line Sinus bradycardia  Possible Left atrial enlargement  RSR' or QR pattern in V1 suggests right ventricular conduction delay  Anterior infarct , age undetermined  Abnormal ECG    Confirmed by Thierry Jensen ([id number]) on 2022 7:47:27 AM (22 @ 14:15)      MEDICATIONS  chlorhexidine 4% Liquid 1 Topical <User Schedule>  enoxaparin Injectable 40 SubCutaneous every 24 hours  lactated ringers. 1000 IV Continuous <Continuous>  pantoprazole    Tablet 40 Oral before breakfast  topiramate 300 Oral daily        ANTIBIOTICS:      ALLERGIES:  Originally Entered as [DYSPNEA] reaction to [sari] (Unknown)  penicillin (Other)

## 2022-05-19 NOTE — ED ADULT NURSE REASSESSMENT NOTE - NSFALLRSKHRMRISKTYPE_ED_ALL_ED
Pt here for PN appt requesting Flu vaccine and Tdap. Pt verified, risk and benefits of both vaccines discussed, questions answered to patients satisfaction. Written consent obtained for both vaccines.  5 rights of medication administration performed before other

## 2022-05-19 NOTE — CONSULT NOTE ADULT - SUBJECTIVE AND OBJECTIVE BOX
HISTORY OF PRESENT ILLNESS:   Ms. Patterson is a 51 year old female known to have:  - Epilepsy. Seizure-free for a long time. Follows with Dr Angel. On Topiramate 300mg QD  - Raynaud Syndrome. Follows with Dr Bullard. On Diltiazem 120mg   - History of Sinus Bradycardia down to HR 30's bpm few months ago after 1st dose of Pfizer Vaccine s/p Admission to [facility] s/p discharge off Diltiazem (but has resumed it herself) and advised against taking 2nd COVID vaccine/ booster      She presented to the ED on 05/18 for evaluation of weakness in setting of recent COVID positive test on 05/09.  History goes back to 05/09 when the patient tested positive for COVID at home.  She reports a low grade fever last Monday until Wednesday associated with chills, dry cough, nausea, diffuse abdominal discomfort (burning), and watery loose stools (around 5+ times per day since last Monday; now getting softer and less frequent; last had 3 episodes on 05/18 per patient).  Since then, she reports progressive weakness and reduced PO intake in the absence of vomiting.      On review of systems, patient reports a recent UTI last month s/p 7-10 day of an AB course (couldn't recall name).  She also reports intermittent chest tightness, skipping sensation, and light headedness on exertion.  She otherwise denies any recent night sweats, URTI symptoms (rhinorrhea, sore throat), headache.   No sick contacts.   No recent travel or exposure to recent travelers.    PAST MEDICAL & SURGICAL HISTORY  H/O Raynaud&#x27;s syndrome    Epilepsy        FAMILY HISTORY:  FAMILY HISTORY:      SOCIAL HISTORY:  []smoker  []Alcohol  []Drug    ROS:  Negative except as mentioned in HPI    ALLERGIES:  Originally Entered as [DYSPNEA] reaction to [sari] (Unknown)  penicillin (Other)      MEDICATIONS:  MEDICATIONS  (STANDING):  chlorhexidine 4% Liquid 1 Application(s) Topical <User Schedule>  enoxaparin Injectable 40 milliGRAM(s) SubCutaneous every 24 hours  lactated ringers. 1000 milliLiter(s) (100 mL/Hr) IV Continuous <Continuous>  pantoprazole    Tablet 40 milliGRAM(s) Oral before breakfast  topiramate 300 milliGRAM(s) Oral daily    MEDICATIONS  (PRN):  aluminum hydroxide/magnesium hydroxide/simethicone Suspension 30 milliLiter(s) Oral every 6 hours PRN Dyspepsia  ondansetron Injectable 4 milliGRAM(s) IV Push every 8 hours PRN Nausea and/or Vomiting  sucralfate 1 Gram(s) Oral four times a day PRN dyspepsia      HOME MEDICATIONS:  Home Medications:  dilTIAZem 120 mg oral tablet: 1 tab(s) orally once a day (18 May 2022 21:54)  topiramate 150 mg oral capsule, extended release: 300 milligram(s) orally once a day (18 May 2022 21:54)  Xanax 2 mg oral tablet: 1 tab(s) orally once a day, As Needed (18 May 2022 21:54)      VITALS:   T(F): 98.2 (05-19 @ 06:55), Max: 98.7 (05-19 @ 06:07)  HR: 34 (05-19 @ 06:55) (34 - 79)  BP: 113/66 (05-19 @ 06:55) (91/55 - 117/68)  BP(mean): --  RR: 21 (05-19 @ 06:55) (17 - 21)  SpO2: 98% (05-19 @ 06:55) (98% - 99%)    I&O's Summary      PHYSICAL EXAM:  GEN: Not in acute distress  HEENT: NCAT, PERRL, EOMI  LUNGS: Clear to auscultation bilaterally   CARDIOVASCULAR: bradycardia  ABD: Soft, non-tender, non-distended  EXT: No NOEMI  SKIN: Intact  NEURO: AAOx3    LABS:                        13.6   2.91  )-----------( 210      ( 18 May 2022 16:28 )             39.0     05-18    146  |  110  |  12  ----------------------------<  96  5.2<H>   |  23  |  0.9    Ca    9.7      18 May 2022 16:28  Mg     2.2     05-18    TPro  7.3  /  Alb  4.6  /  TBili  0.4  /  DBili  x   /  AST  27  /  ALT  14  /  AlkPhos  72  05-18      Troponin T, Serum: <0.01 ng/mL (05-19-22 @ 01:41)  Troponin T, Serum: <0.01 ng/mL (05-18-22 @ 16:28)    Troponin <0.01, CKMB --, CK --/ 05-19-22 @ 01:41  Troponin <0.01, CKMB --, CK --/ 05-18-22 @ 16:28        Hemoglobin A1C   Thyroid    EKG: sinus leidy  CXR clear

## 2022-05-19 NOTE — ED ADULT NURSE REASSESSMENT NOTE - NS ED NURSE REASSESS COMMENT FT1
Patient states she hasn't had a bowel movement since Tuesday night.  Call to admit PA to discuss need for contact isolation.  Admit physician insists that patient be rule out C-Diff based on discussion with patient.    Patient now states she hasn't had a bowel movement because she hasn't had anything to eat and she's now forgotten her last BM.  MD aware

## 2022-05-19 NOTE — CONSULT NOTE ADULT - ASSESSMENT
ASSESSMENT  51 year old female known to have:  - Epilepsy. Seizure-free for a long time. Follows with Dr Angel. On Topiramate 300mg QD  - Raynaud Syndrome. Follows with Dr Bullard. On Diltiazem 120mg   - History of Sinus Bradycardia down to HR 30's bpm few months ago after 1st dose of Pfizer Vaccine s/p Admission to [facility] s/p discharge off Diltiazem (but has resumed it herself) and advised against taking 2nd COVID vaccine/ booster  She presented to the ED on 05/18 for evaluation of weakness in setting of recent COVID positive test on 05/09.      IMPRESSION  #COVID19    pfizer x1- complicated by leidy    CXR no opacities   #Bradycardia  #Sepsis ruled out on admission   #Abx allergy: penicillin (Other)    Creatinine, Serum: 0.9 (05-18-22 @ 16:28)    RECOMMENDATIONS  This is an incomplete consult note. All final recommendations to follow after interview and examination of the patient. Please follow recommendations noted below.  - Patient does not meet criteria for available COVID-19 therapeutics  - Please inform ID if worsening oxygenation     If any questions, please call or send a message on [employer] Teams  Please continue to update ID with any pertinent new laboratory or radiographic findings  Spectra [id number] ASSESSMENT  51 year old female known to have:  - Epilepsy. Seizure-free for a long time. Follows with Dr Angel. On Topiramate 300mg QD  - Raynaud Syndrome. Follows with Dr Bullard. On Diltiazem 120mg   - History of Sinus Bradycardia down to HR 30's bpm few months ago after 1st dose of Pfizer Vaccine s/p Admission to [facility] s/p discharge off Diltiazem (but has resumed it herself) and advised against taking 2nd COVID vaccine/ booster  She presented to the ED on 05/18 for evaluation of weakness in setting of recent COVID positive test on 05/09.      IMPRESSION  #COVID19    + 5/9 per nurse    pfizer x1- complicated by leidy    CXR no opacities   #Bradycardia  #Sepsis ruled out on admission   #Abx allergy: penicillin (Other)    Creatinine, Serum: 0.9 (05-18-22 @ 16:28)    RECOMMENDATIONS  - Patient does not meet criteria for available COVID-19 therapeutics  - Please inform ID if worsening oxygenation   - Recall PRN    If any questions, please call or send a message on "[employer] Teams  Please continue to update ID with any pertinent new laboratory or radiographic findings  Spectra [id number]

## 2022-05-19 NOTE — CONSULT NOTE ADULT - TIME BILLING
Bradycardia, Covid.
Sinus leidy, COVID
I have personally seen and examined this patient.  I have reviewed all pertinent clinical information and reviewed all relevant imaging and diagnostic studies personally.   If possible, I counseled the patient about diagnostic testing and treatment plan.   I discussed my recommendations with the primary team.

## 2022-05-20 LAB
B BURGDOR C6 AB SER-ACNC: NEGATIVE — SIGNIFICANT CHANGE UP
B BURGDOR IGG+IGM SER-ACNC: 0.07 INDEX — SIGNIFICANT CHANGE UP (ref 0.01–0.89)
SARS-COV-2 RNA SPEC QL NAA+PROBE: SIGNIFICANT CHANGE UP
TSH SERPL-MCNC: 3.76 UIU/ML — SIGNIFICANT CHANGE UP (ref 0.27–4.2)

## 2022-05-20 PROCEDURE — 99222 1ST HOSP IP/OBS MODERATE 55: CPT

## 2022-05-20 PROCEDURE — 99233 SBSQ HOSP IP/OBS HIGH 50: CPT

## 2022-05-20 PROCEDURE — 70450 CT HEAD/BRAIN W/O DYE: CPT | Mod: 26

## 2022-05-20 RX ORDER — IBUPROFEN 200 MG
600 TABLET ORAL EVERY 6 HOURS
Refills: 0 | Status: DISCONTINUED | OUTPATIENT
Start: 2022-05-20 | End: 2022-05-24

## 2022-05-20 RX ORDER — ONDANSETRON 8 MG/1
4 TABLET, FILM COATED ORAL ONCE
Refills: 0 | Status: COMPLETED | OUTPATIENT
Start: 2022-05-20 | End: 2022-05-20

## 2022-05-20 RX ORDER — REGADENOSON 0.08 MG/ML
0.4 INJECTION, SOLUTION INTRAVENOUS ONCE
Refills: 0 | Status: DISCONTINUED | OUTPATIENT
Start: 2022-05-20 | End: 2022-05-22

## 2022-05-20 RX ORDER — TOPIRAMATE 25 MG
300 TABLET ORAL ONCE
Refills: 0 | Status: COMPLETED | OUTPATIENT
Start: 2022-05-20 | End: 2022-05-20

## 2022-05-20 RX ORDER — POLYETHYLENE GLYCOL 3350 17 G/17G
17 POWDER, FOR SOLUTION ORAL DAILY
Refills: 0 | Status: DISCONTINUED | OUTPATIENT
Start: 2022-05-20 | End: 2022-05-24

## 2022-05-20 RX ORDER — IBUPROFEN 200 MG
200 TABLET ORAL ONCE
Refills: 0 | Status: COMPLETED | OUTPATIENT
Start: 2022-05-20 | End: 2022-05-20

## 2022-05-20 RX ADMIN — Medication 650 MILLIGRAM(S): at 05:11

## 2022-05-20 RX ADMIN — Medication 600 MILLIGRAM(S): at 23:35

## 2022-05-20 RX ADMIN — Medication 300 MILLIGRAM(S): at 11:02

## 2022-05-20 RX ADMIN — PANTOPRAZOLE SODIUM 40 MILLIGRAM(S): 20 TABLET, DELAYED RELEASE ORAL at 05:55

## 2022-05-20 RX ADMIN — Medication 600 MILLIGRAM(S): at 18:09

## 2022-05-20 RX ADMIN — ONDANSETRON 4 MILLIGRAM(S): 8 TABLET, FILM COATED ORAL at 11:02

## 2022-05-20 RX ADMIN — Medication 200 MILLIGRAM(S): at 16:08

## 2022-05-20 RX ADMIN — ENOXAPARIN SODIUM 40 MILLIGRAM(S): 100 INJECTION SUBCUTANEOUS at 18:10

## 2022-05-20 RX ADMIN — Medication 200 MILLIGRAM(S): at 12:46

## 2022-05-20 RX ADMIN — PANTOPRAZOLE SODIUM 40 MILLIGRAM(S): 20 TABLET, DELAYED RELEASE ORAL at 17:35

## 2022-05-20 RX ADMIN — Medication 650 MILLIGRAM(S): at 05:41

## 2022-05-20 NOTE — PHYSICAL THERAPY INITIAL EVALUATION ADULT - GAIT TRAINING, PT EVAL
Goal: amb no Assistive Device require supervision 200' , Goal: navigate 10 steps with HR require cg by discharge

## 2022-05-20 NOTE — CHART NOTE - NSCHARTNOTEFT_GEN_A_CORE
Spoke to ID pt, does not any further isolation as pt COVID positive was on 5/9 and has been greater than 6 days. Isolation can be dced. Spoke to ID pt, does not any further isolation as pt COVID positive was on 5/9 and has been greater than 10 days. Isolation can be dced.

## 2022-05-20 NOTE — PROGRESS NOTE ADULT - SUBJECTIVE AND OBJECTIVE BOX
SUBJECTIVE:    Patient is a 51y old Female who presents with a chief complaint of Weakness (19 May 2022 19:46)    Currently admitted to medicine with the primary diagnosis of COVID       Today is hospital day 2d. This morning she is resting comfortably in bed and reports no new issues or overnight events.     PAST MEDICAL & SURGICAL HISTORY  H/O Raynaud&#x27;s syndrome    Epilepsy    No significant past surgical history      SOCIAL HISTORY:  Negative for smoking/alcohol/drug use.     ALLERGIES:  Originally Entered as [DYSPNEA] reaction to [sari] (Unknown)  penicillin (Other)    MEDICATIONS:  STANDING MEDICATIONS  chlorhexidine 4% Liquid 1 Application(s) Topical <User Schedule>  enoxaparin Injectable 40 milliGRAM(s) SubCutaneous every 24 hours  lactated ringers. 1000 milliLiter(s) IV Continuous <Continuous>  pantoprazole   Suspension 40 milliGRAM(s) Oral two times a day  regadenoson Injectable 0.4 milliGRAM(s) IV Push once  topiramate 300 milliGRAM(s) Oral daily    PRN MEDICATIONS  acetaminophen     Tablet .. 650 milliGRAM(s) Oral every 6 hours PRN  aluminum hydroxide/magnesium hydroxide/simethicone Suspension 30 milliLiter(s) Oral every 6 hours PRN  ondansetron Injectable 4 milliGRAM(s) IV Push every 8 hours PRN  sucralfate 1 Gram(s) Oral four times a day PRN    VITALS:   T(F): 97.8  HR: 40  BP: 107/56  RR: 18  SpO2: 99%    LABS:                        13.0   3.56  )-----------( 206      ( 19 May 2022 04:30 )             37.1     05-19    142  |  111<H>  |  10  ----------------------------<  83  3.9   |  19  |  0.8    Ca    9.3      19 May 2022 04:30  Phos  3.0     05-19  Mg     1.9     05-19    TPro  6.0  /  Alb  4.0  /  TBili  0.4  /  DBili  x   /  AST  12  /  ALT  10  /  AlkPhos  66  05-19              CARDIAC MARKERS ( 19 May 2022 04:30 )  x     / <0.01 ng/mL / x     / x     / x      CARDIAC MARKERS ( 19 May 2022 01:41 )  x     / <0.01 ng/mL / x     / x     / x      CARDIAC MARKERS ( 18 May 2022 16:28 )  x     / <0.01 ng/mL / x     / x     / x          RADIOLOGY:    PHYSICAL EXAM:  GEN: No acute distress  LUNGS: Clear to auscultation bilaterally   HEART: Regular  ABD: Soft, non-tender, non-distended.  EXT: NC/NC/NE/2+PP/NGUYEN/Skin Intact.   NEURO: AAOX3    Intravenous access:   NG tube:   Alvarado Catheter:        SUBJECTIVE:    Patient is a 51y old Female who presents with a chief complaint of Weakness (19 May 2022 19:46)    Currently admitted to medicine with the primary diagnosis of COVID       Today is hospital day 2d. This morning she is resting comfortably in bed and reports no new issues or overnight events.     Attending note: Pt seen and examined at bedside. Pt states that she is having generalized weakness and having a pain in her right eye. She states that this is new and feels worse today.     PAST MEDICAL & SURGICAL HISTORY  H/O Raynaud&#x27;s syndrome    Epilepsy    No significant past surgical history      SOCIAL HISTORY:  Negative for smoking/alcohol/drug use.     ALLERGIES:  Originally Entered as [DYSPNEA] reaction to [sari] (Unknown)  penicillin (Other)    MEDICATIONS:  STANDING MEDICATIONS  chlorhexidine 4% Liquid 1 Application(s) Topical <User Schedule>  enoxaparin Injectable 40 milliGRAM(s) SubCutaneous every 24 hours  lactated ringers. 1000 milliLiter(s) IV Continuous <Continuous>  pantoprazole   Suspension 40 milliGRAM(s) Oral two times a day  regadenoson Injectable 0.4 milliGRAM(s) IV Push once  topiramate 300 milliGRAM(s) Oral daily    PRN MEDICATIONS  acetaminophen     Tablet .. 650 milliGRAM(s) Oral every 6 hours PRN  aluminum hydroxide/magnesium hydroxide/simethicone Suspension 30 milliLiter(s) Oral every 6 hours PRN  ondansetron Injectable 4 milliGRAM(s) IV Push every 8 hours PRN  sucralfate 1 Gram(s) Oral four times a day PRN    VITALS:   T(F): 97.8  HR: 40  BP: 107/56  RR: 18  SpO2: 99%    LABS:                        13.0   3.56  )-----------( 206      ( 19 May 2022 04:30 )             37.1     05-19    142  |  111<H>  |  10  ----------------------------<  83  3.9   |  19  |  0.8    Ca    9.3      19 May 2022 04:30  Phos  3.0     05-19  Mg     1.9     05-19    TPro  6.0  /  Alb  4.0  /  TBili  0.4  /  DBili  x   /  AST  12  /  ALT  10  /  AlkPhos  66  05-19        CARDIAC MARKERS ( 19 May 2022 04:30 )  x     / <0.01 ng/mL / x     / x     / x      CARDIAC MARKERS ( 19 May 2022 01:41 )  x     / <0.01 ng/mL / x     / x     / x      CARDIAC MARKERS ( 18 May 2022 16:28 )  x     / <0.01 ng/mL / x     / x     / x          RADIOLOGY:    PHYSICAL EXAM:  GEN: No acute distress  LUNGS: Clear to auscultation bilaterally   HEART: Regular  ABD: Soft, non-tender, non-distended.  EXT: NC/NC/NE/2+PP/NGUYEN/Skin Intact.   NEURO: AAOX3    Intravenous access:   NG tube:   Alvarado Catheter:

## 2022-05-20 NOTE — PROGRESS NOTE ADULT - ASSESSMENT
Case of a 51 year old female patient with Epilepsy and Raynaud Phenomenon who presented to the ED on 05/18 for evaluation of weakness in setting of recent COVID positive test at home, fever, chills, diarrhea, and cough, found to have sinus bradycardia but otherwise insignificant chest and abdominal imaging, to be admitted for further investigations, management, and monitoring. Currently still in sinus bradycardia around 50bpm.    Weakness in Setting of Diarrhea  Rule Out Clostridium Difficile Infection in Setting of Recent Ab Use for UTI  * Onset: 05/09: fever x3 days last week M-W, chills, dry cough, diarrhea since last week (improving), abdominal discomfort (no sore throat or runny nose)  * Recent AB use for UTI last month for 7-10 days (unsure about name of Ab)  * /68 mmHg, HR down to 38 bpm -> currently 50 bpm, T 36.1, SaO2 98% on RA  * CXR no acute process  * CT AP without contrast no intraabdominal process  * S/P 1L NS and Zofran in ED    - Consider GI consult if symptoms do not improve  - Monitor T  - Trend WBC  - Start IV Fluid hydration with LR at 100mL/hour. Keep NPO for now. S/P 1L NS in ED  - Check orthostatics and monitor BP  - Monitor nausea and keep score at 01/10: start IV Zofran 4mg Q8h PRN  - Start Protonix, Carafate, and Aluminum hydroxide PRN for dyspepsia  - Monitor off Abs  - Monitor BM for recurrence of diarrhea: last episodes x3 on 05/18  - Check C difficile PCR and toxin in setting of recent Ab use for UTI last month for 7-10 days (unsure about name of Ab)  - Please send stool cultures, GI PCR  - Avoid laxatives pending infectious workup  - Check ESR, CRP, Lactoferrin  - PT/OT evaluation    Sinus Bradycardia (in Setting of Recent COVID Positive Test + On Diltiazem for Raynaud)  History of Sinus Bradycardia After First Pfizer Vaccine Dose while being on Diltiazem  * History of Sinus Bradycardia down to HR 30's bpm few months ago after 1st dose of Pfizer Vaccine s/p Admission to [facility] s/p discharge off Diltiazem (but has resumed it herself) and advised against taking 2nd COVID vaccine/ booster  * Home med Diltiazem 120mg for Raynaud  * ED HR went down to 38bpm (asymptomatic; symptoms stable since 2 weeks)  * ECG sinus bradycardia with no block    - Cardiology evaluation: per patient, she has been evaluated at [facility] and etiology was thought to be related to 1st dose of Pfizer vaccine (asked to avoid further shots) s/p discharge of Diltiazem 120mg QD (but patient resumed it at home)  - Hold Diltiazem 120mg QD and follow up outpatient with Dr Bullard post discharge  - Avoid Beta blocking agents  - Keep atropine at bedside to be used for symptomatic bradycardia or very low HR  - Monitor tele  - Monitor HR  - Check TSH  - Will send lyme  - Check TTE    Recent COVID Positive Home Test  Pending COVID PCR 05/18  Leukopenia  * Onset: 05/09: fever x3 days last week M-W, chills, dry cough, diarrhea since last week (improving), abdominal discomfort (no sore throat or runny nose)  * Vaccinated against COVID: 1x Pfizer only due to complication as above  * ED HR went down to 38bpm (asymptomatic; symptoms stable since 2 weeks)  * ED Labs WBC 2.91  * SARS-COV2: received  * CXR no acute process  * Markers as below    - Infectious Disease team consulted  - Monitor for fever: afebrile in last 24 hours  - Trend WBC  - Monitor SaO2 and Oxygen Requirements: on RA  - Follow up Chest X Ray    - Trend Inflammatory Markers:  --> ESR   --> D-dimer; check VA duplex venous LE  --> Procalcitonin   --> C-reactive protein  --> LDH   --> Ferritin   --> Fibrinogen  - Follow up blood cultures   - Send urine legionella and strep  - Check RVP and influenza  - Monitor off antimicrobials or steroids  - Anticoagulation per protocol      Epilepsy  * Free of seizures for a long time  * Follows with Dr Angel  * On Topiramate 300mg QD  - Outpatient follow up with Dr Angel  - Resume Topiramate 300mg QD  - Follow up AED level in AM      Raynaud Syndrome  * Follows with Dr Bullard  * On Diltiazem 120mg   - Hold Diltiazem 120mg QD and follow up outpatient with Dr Bullard post discharge      Others  - DVT Prophylaxis: Lovenox 40mg Subcutaneously daily  - GI Prophylaxis: Pantoprazole 40mg PO QD  - Diet: NPO as above  - Code Status: Full   Case of a 51 year old female patient with Epilepsy and Raynaud Phenomenon who presented to the ED on 05/18 for evaluation of weakness in setting of recent COVID positive test at home, fever, chills, diarrhea, and cough, found to have sinus bradycardia but otherwise insignificant chest and abdominal imaging, to be admitted for further investigations, management, and monitoring. Currently still in sinus bradycardia around 50bpm.    # Generalized weakness and dizziness in setting of sinus bradycardia  * Sinus Bradycardia (in Setting of Recent COVID Positive Test + On Diltiazem for Raynaud)  History of Sinus Bradycardia After First Pfizer Vaccine Dose while being on Diltiazem  * History of Sinus Bradycardia down to HR 30's bpm few months ago after 1st dose of Pfizer Vaccine s/p Admission to [facility] s/p discharge off Diltiazem (but has resumed it herself) and advised against taking 2nd COVID vaccine/ booster  * Cardiology evaluation: per patient, she has been evaluated at [facility] and etiology was thought to be related to 1st dose of Pfizer vaccine (asked to avoid further shots) s/p discharge of Diltiazem 120mg QD (but patient resumed it at home)  - Holding AVN blocking agents   - If patient continues to be leidy off AVN agents will discuss option of PPM as per EP  - Can c/w topiramate as per neuro (known to cause bradycardia, neuro thinks unlikely)  - f/u 2D Echo  - f/u NST to r/o ischemia and for chronotropic incompetence  - Cont tele monitoring  - Monitor electrolytes, maintain WNL    # Diarrhea and nausea  - Resolved  - CT AP without contrast no intraabdominal process  - S/P 1L NS and Zofran in ED  - c/w IV fluid  - Check orthostatics and monitor BP  - Monitor nausea and keep score at 01/10: start IV Zofran 4mg Q8h PRN  - c/w Protonix, Carafate, and Aluminum hydroxide PRN for dyspepsia  - Monitor BM for recurrence of diarrhea: last episodes x3 on 05/18  - Check C difficile PCR and toxin in setting of recent Ab use for UTI last month for 7-10 days (unsure about name of Ab)  - Avoid laxatives pending infectious workup  - ESR 8,f/u CRP, Lactoferrin      COVID positive  * Onset: 05/09: fever x3 days last week M-W, chills, dry cough, diarrhea since last week (improving), abdominal discomfort (no sore throat or runny nose)  * Vaccinated against COVID: 1x Pfizer only due to complication as above  * ED HR went down to 38bpm (asymptomatic; symptoms stable since 2 weeks)  * ED Labs WBC 2.91  * CXR no acute process  - Infectious Disease team consulted  - Monitor for fever: afebrile in last 24 hours  - Trend WBC  - Monitor SaO2 and Oxygen Requirements: on RA   - Trend Inflammatory Markers:  - Follow up blood cultures   - Send urine legionella and strep  - Check RVP and influenza  - Monitor off antimicrobials or steroids  - Anticoagulation per protocol      Epilepsy  * Free of seizures for a long time  * Follows with Dr Angel  * On Topiramate 300mg QD  - Outpatient follow up with Dr Angel  - Resume Topiramate 300mg QD  - Follow up AED level in AM      Raynaud Syndrome  * Follows with Dr Bullard  * On Diltiazem 120mg   - Hold Diltiazem 120mg QD and follow up outpatient with Dr Bullard post discharge      Others  - DVT Prophylaxis: Lovenox 40mg Subcutaneously daily  - GI Prophylaxis: Pantoprazole 40mg PO QD  - Diet: NPO as above  - Code Status: Full   Case of a 51 year old female patient with Epilepsy and Raynaud Phenomenon who presented to the ED on 05/18 for evaluation of weakness in setting of recent COVID positive test at home, fever, chills, diarrhea, and cough, found to have sinus bradycardia but otherwise insignificant chest and abdominal imaging, to be admitted for further investigations, management, and monitoring. Currently still in sinus bradycardia around 50bpm.    # Generalized weakness and dizziness in setting of sinus bradycardia  * Sinus Bradycardia (in Setting of Recent COVID Positive Test + On Diltiazem for Raynaud)  History of Sinus Bradycardia After First Pfizer Vaccine Dose while being on Diltiazem  * History of Sinus Bradycardia down to HR 30's bpm few months ago after 1st dose of Pfizer Vaccine s/p Admission to [facility] s/p discharge off Diltiazem (but has resumed it herself) and advised against taking 2nd COVID vaccine/ booster  * Cardiology evaluation: per patient, she has been evaluated at [facility] and etiology was thought to be related to 1st dose of Pfizer vaccine (asked to avoid further shots) s/p discharge of Diltiazem 120mg QD (but patient resumed it at home)  - Holding AVN blocking agents   - If patient continues to be leidy off AVN agents will discuss option of PPM as per EP  - Can c/w topiramate as per neuro (known to cause bradycardia, neuro thinks unlikely)  - f/u 2D Echo  - f/u NST to r/o ischemia and for chronotropic incompetence  - Cont tele monitoring  - Monitor electrolytes, maintain WNL    Attending note: Pt having weakness and new eye pain, HCT done neg, likley 2/2 not taking her topomax, pt has trigeminal neuralgia. Spoke to Neuro recc continue topomax     # Diarrhea and nausea  - Resolved  - CT AP without contrast no intraabdominal process  - S/P 1L NS and Zofran in ED  - c/w IV fluid  - Check orthostatics and monitor BP  - Monitor nausea and keep score at 01/10: start IV Zofran 4mg Q8h PRN  - c/w Protonix, Carafate, and Aluminum hydroxide PRN for dyspepsia  - Monitor BM for recurrence of diarrhea: last episodes x3 on 05/18  - Check C difficile PCR and toxin in setting of recent Ab use for UTI last month for 7-10 days (unsure about name of Ab)  - Avoid laxatives pending infectious workup  - ESR 8,f/u CRP, Lactoferrin    COVID positive  * Onset: 05/09: fever x3 days last week M-W, chills, dry cough, diarrhea since last week (improving), abdominal discomfort (no sore throat or runny nose)  * Vaccinated against COVID: 1x Pfizer only due to complication as above  * ED HR went down to 38bpm (asymptomatic; symptoms stable since 2 weeks)  * ED Labs WBC 2.91  * CXR no acute process  - Infectious Disease team consulted  - Monitor for fever: afebrile in last 24 hours  - Trend WBC  - Monitor SaO2 and Oxygen Requirements: on RA   - Trend Inflammatory Markers:  - Follow up blood cultures   - Send urine legionella and strep  - Check RVP and influenza  - Monitor off antimicrobials or steroids  - Anticoagulation per protocol  - Isolation DCed as >10 days      Epilepsy and trigeminal neuralgia   * Free of seizures for a long time  * Follows with Dr Angel  * On Topiramate 300mg QD  - Outpatient follow up with Dr Angel  - Resume Topiramate 300mg QD  - Follow up AED level in AM      Raynaud Syndrome  * Follows with Dr Bullard  * On Diltiazem 120mg   - Hold Diltiazem 120mg QD and follow up outpatient with Dr Bullard post discharge      Others  - DVT Prophylaxis: Lovenox 40mg Subcutaneously daily  - GI Prophylaxis: Pantoprazole 40mg PO QD  - Diet: NPO as above  - Code Status: Full

## 2022-05-20 NOTE — PHYSICAL THERAPY INITIAL EVALUATION ADULT - GENERAL OBSERVATIONS, REHAB EVAL
[id number] Patient encountered semi burciaga in bed + IV lock, + tele, NAD , receptive to PT  HR 54 BPM with amb , pox 99%

## 2022-05-20 NOTE — PHYSICAL THERAPY INITIAL EVALUATION ADULT - IMPAIRMENTS CONTRIBUTING TO GAIT DEVIATIONS, PT EVAL
decreased strength Complex Repair And Dermal Autograft Text: The defect edges were debeveled with a #15 scalpel blade.  The primary defect was closed partially with a complex linear closure.  Given the location of the defect, shape of the defect and the proximity to free margins an dermal autograft was deemed most appropriate to repair the remaining defect.  The graft was trimmed to fit the size of the remaining defect.  The graft was then placed in the primary defect, oriented appropriately, and sutured into place.

## 2022-05-20 NOTE — PHYSICAL THERAPY INITIAL EVALUATION ADULT - PERTINENT HX OF CURRENT PROBLEM, REHAB EVAL
Case of a 51 year old female patient with Epilepsy and Raynaud Phenomenon who presented to the ED on 05/18 for evaluation of weakness in setting of recent COVID positive test at home, fever, chills, diarrhea, and cough, found to have sinus bradycardia but otherwise insignificant chest and abdominal imaging, to be admitted for further investigations, management, and monitoring. Currently still in sinus bradycardia around 50bpm.

## 2022-05-21 LAB
BASOPHILS # BLD AUTO: 0.02 K/UL — SIGNIFICANT CHANGE UP (ref 0–0.2)
BASOPHILS NFR BLD AUTO: 0.4 % — SIGNIFICANT CHANGE UP (ref 0–1)
CRP SERPL-MCNC: <3 MG/L — SIGNIFICANT CHANGE UP
EOSINOPHIL # BLD AUTO: 0.04 K/UL — SIGNIFICANT CHANGE UP (ref 0–0.7)
EOSINOPHIL NFR BLD AUTO: 0.9 % — SIGNIFICANT CHANGE UP (ref 0–8)
HCT VFR BLD CALC: 35.3 % — LOW (ref 37–47)
HGB BLD-MCNC: 12.6 G/DL — SIGNIFICANT CHANGE UP (ref 12–16)
IMM GRANULOCYTES NFR BLD AUTO: 0.4 % — HIGH (ref 0.1–0.3)
LYMPHOCYTES # BLD AUTO: 1.68 K/UL — SIGNIFICANT CHANGE UP (ref 1.2–3.4)
LYMPHOCYTES # BLD AUTO: 36.8 % — SIGNIFICANT CHANGE UP (ref 20.5–51.1)
MCHC RBC-ENTMCNC: 34.5 PG — HIGH (ref 27–31)
MCHC RBC-ENTMCNC: 35.7 G/DL — SIGNIFICANT CHANGE UP (ref 32–37)
MCV RBC AUTO: 96.7 FL — SIGNIFICANT CHANGE UP (ref 81–99)
MONOCYTES # BLD AUTO: 0.28 K/UL — SIGNIFICANT CHANGE UP (ref 0.1–0.6)
MONOCYTES NFR BLD AUTO: 6.1 % — SIGNIFICANT CHANGE UP (ref 1.7–9.3)
NEUTROPHILS # BLD AUTO: 2.53 K/UL — SIGNIFICANT CHANGE UP (ref 1.4–6.5)
NEUTROPHILS NFR BLD AUTO: 55.4 % — SIGNIFICANT CHANGE UP (ref 42.2–75.2)
NRBC # BLD: 0 /100 WBCS — SIGNIFICANT CHANGE UP (ref 0–0)
PLATELET # BLD AUTO: 266 K/UL — SIGNIFICANT CHANGE UP (ref 130–400)
PROCALCITONIN SERPL-MCNC: 0.02 NG/ML — SIGNIFICANT CHANGE UP (ref 0.02–0.1)
RBC # BLD: 3.65 M/UL — LOW (ref 4.2–5.4)
RBC # FLD: 10.6 % — LOW (ref 11.5–14.5)
WBC # BLD: 4.57 K/UL — LOW (ref 4.8–10.8)
WBC # FLD AUTO: 4.57 K/UL — LOW (ref 4.8–10.8)

## 2022-05-21 PROCEDURE — 93306 TTE W/DOPPLER COMPLETE: CPT | Mod: 26

## 2022-05-21 PROCEDURE — 99232 SBSQ HOSP IP/OBS MODERATE 35: CPT

## 2022-05-21 PROCEDURE — 99233 SBSQ HOSP IP/OBS HIGH 50: CPT

## 2022-05-21 PROCEDURE — 75572 CT HRT W/3D IMAGE: CPT | Mod: 26

## 2022-05-21 RX ORDER — SENNA PLUS 8.6 MG/1
2 TABLET ORAL AT BEDTIME
Refills: 0 | Status: DISCONTINUED | OUTPATIENT
Start: 2022-05-21 | End: 2022-05-24

## 2022-05-21 RX ADMIN — PANTOPRAZOLE SODIUM 40 MILLIGRAM(S): 20 TABLET, DELAYED RELEASE ORAL at 17:47

## 2022-05-21 RX ADMIN — Medication 600 MILLIGRAM(S): at 22:27

## 2022-05-21 RX ADMIN — SENNA PLUS 2 TABLET(S): 8.6 TABLET ORAL at 21:39

## 2022-05-21 RX ADMIN — Medication 600 MILLIGRAM(S): at 03:52

## 2022-05-21 RX ADMIN — POLYETHYLENE GLYCOL 3350 17 GRAM(S): 17 POWDER, FOR SOLUTION ORAL at 12:52

## 2022-05-21 RX ADMIN — SENNA PLUS 2 TABLET(S): 8.6 TABLET ORAL at 02:24

## 2022-05-21 RX ADMIN — PANTOPRAZOLE SODIUM 40 MILLIGRAM(S): 20 TABLET, DELAYED RELEASE ORAL at 06:07

## 2022-05-21 RX ADMIN — Medication 600 MILLIGRAM(S): at 23:10

## 2022-05-21 RX ADMIN — ENOXAPARIN SODIUM 40 MILLIGRAM(S): 100 INJECTION SUBCUTANEOUS at 17:48

## 2022-05-21 RX ADMIN — Medication 300 MILLIGRAM(S): at 12:52

## 2022-05-21 NOTE — PROGRESS NOTE ADULT - ASSESSMENT
51 year old female patient with Epilepsy and Raynaud Phenomenon who presented to the ED on 05/18 for evaluation of weakness  Recent COVID infection  in symptomatic sinus bradycardia    symptomatic leidy  recent COVID infection  Epilepsy  Raynoud     con't tele  please transfer to functioning monitor in order to be able to evaluate for at least 24hours of continuous telemetry   Maintain electrolytes K>4.0 Mg >2.0  no AVN blockers  neurology note appreciated regarding Topamax, (pt tolerated medication for 15yrs without bradycardia)  will follow up

## 2022-05-21 NOTE — PATIENT PROFILE ADULT - FALL HARM RISK - HARM RISK INTERVENTIONS

## 2022-05-21 NOTE — PROGRESS NOTE ADULT - ASSESSMENT
51 year old female patient with Epilepsy and Raynaud Phenomenon who presented to the ED on 05/18 for evaluation of weakness in setting of recent COVID positive test at home, fever, chills, diarrhea, and cough, found to have sinus bradycardia but otherwise insignificant chest and abdominal imaging, to be admitted for further investigations, management, and monitoring. Currently still in sinus bradycardia around 50bpm.    # Generalized weakness and dizziness in setting of sinus bradycardia  * Sinus Bradycardia (in Setting of Recent COVID Positive Test + On Diltiazem for Raynaud)  History of Sinus Bradycardia After First Pfizer Vaccine Dose while being on Diltiazem  * History of Sinus Bradycardia down to HR 30's bpm few months ago after 1st dose of Pfizer Vaccine s/p Admission to [facility] s/p discharge off Diltiazem (but has resumed it herself) and advised against taking 2nd COVID vaccine/ booster  * Cardiology evaluation: per patient, she has been evaluated at [facility] and etiology was thought to be related to 1st dose of Pfizer vaccine (asked to avoid further shots) s/p discharge of Diltiazem 120mg QD (but patient resumed it at home)  - Holding AVN blocking agents   - If patient continues to be leidy off AVN agents will discuss option of PPM as per EP  - Can c/w topiramate as per neuro (known to cause bradycardia, neuro thinks unlikely)  - f/u 2D Echo  - f/u NST to r/o ischemia and for chronotropic incompetence  - Cont tele monitoring  - Monitor electrolytes, maintain WNL  -Yesterday the patient's stress test was unable to be done and EP reevaluated situation and recommended a CCTA.  CCTA was negative.  EP came to evaluate the patient today but telemetry readings were deleted.  They recommended to move the patient to a telemetry floor suggest 3C or ED for to have it recording her telemetry.  Patient does feel better today and is less weak. Spoke to bed managment to arrange transfer       # Diarrhea and nausea- Resolved  - CT AP without contrast no intraabdominal process  - S/P 1L NS and Zofran in ED  - c/w IV fluid  - Check orthostatics and monitor BP  - Monitor nausea and keep score at 01/10: start IV Zofran 4mg Q8h PRN  - c/w Protonix, Carafate, and Aluminum hydroxide PRN for dyspepsia  - Monitor BM for recurrence of diarrhea: last episodes x3 on 05/18  - Check C difficile PCR and toxin in setting of recent Ab use for UTI last month for 7-10 days (unsure about name of Ab)  - Avoid laxatives pending infectious workup  - ESR 8,f/u CRP, Lactoferrin  -Patient now was constipated and requesting something to help with her constipation.  We will start her on MiraLAX.    COVID positive- resolved  * Onset: 05/09: fever x3 days last week M-W, chills, dry cough, diarrhea since last week (improving), abdominal discomfort (no sore throat or runny nose)  * Vaccinated against COVID: 1x Pfizer only due to complication as above  * ED HR went down to 38bpm (asymptomatic; symptoms stable since 2 weeks)  * ED Labs WBC 2.91  * CXR no acute process  - Infectious Disease team consulted  - Monitor for fever: afebrile in last 24 hours  - Trend WBC  - Monitor SaO2 and Oxygen Requirements: on RA   - Trend Inflammatory Markers:  - Follow up blood cultures   - Send urine legionella and strep  - Check RVP and influenza  - Monitor off antimicrobials or steroids  - Anticoagulation per protocol  - Isolation DCed as >10 days  -Repeat COVID test was negative for COVID.      Epilepsy and trigeminal neuralgia   * Free of seizures for a long time  * Follows with Dr Angel  * On Topiramate 300mg QD  - Outpatient follow up with Dr Angel  - Resume Topiramate 300mg QD  - Follow up AED level in AM      Raynaud Syndrome  * Follows with Dr Bullard  * On Diltiazem 120mg   - Hold Diltiazem 120mg QD and follow up outpatient with Dr Bullard post discharge      Others  - DVT Prophylaxis: Lovenox 40mg Subcutaneously daily  - GI Prophylaxis: Pantoprazole 40mg PO QD  - Diet: NPO as above  - Code Status: Full    #Progress Note Handoff  Pending (specify): Follow-up telemetry readings the next 24 hours.  EP to give us plan after tomorrow's telemetry readings.  If patient does not have chronotropic independence will likely need a device.  Family discussion: house staff updated pt family  Disposition: cardiac telemonitoring, anticipated 24-48 hrs. patient has no needs. follow up labs

## 2022-05-21 NOTE — PROGRESS NOTE ADULT - SUBJECTIVE AND OBJECTIVE BOX
Pt seen and examined at bedside. No CP or SOB. feels better today           PAST MEDICAL & SURGICAL HISTORY:  H/O Raynaud&#x27;s syndrome    Epilepsy    No significant past surgical history        VITAL SIGNS (Last 24 hrs):  T(C): 36.3 (05-21-22 @ 08:06), Max: 36.4 (05-20-22 @ 18:57)  HR: 46 (05-21-22 @ 15:47) (37 - 50)  BP: 119/76 (05-21-22 @ 15:47) (101/65 - 120/74)  RR: 18 (05-21-22 @ 15:47) (18 - 18)  SpO2: 99% (05-21-22 @ 15:47) (98% - 100%)  Wt(kg): --  Daily     Daily     I&O's Summary      PHYSICAL EXAM:  GENERAL: NAD, well-developed  HEAD:  Atraumatic, Normocephalic  EYES: EOMI, PERRLA, conjunctiva and sclera clear  NECK: Supple, No JVD  CHEST/LUNG: Clear to auscultation bilaterally; No wheeze  HEART: Regular rate and rhythm; No murmurs, rubs, or gallops  ABDOMEN: Soft, Nontender, Nondistended; Bowel sounds present  EXTREMITIES:  2+ Peripheral Pulses, No clubbing, cyanosis, or edema  PSYCH: AAOx3  NEUROLOGY: non-focal  SKIN: No rashes or lesions, raynauds on finger tips    Labs Reviewed  Spoke to patient in regards to abnormal labs.    CBC Full  -  ( 19 May 2022 04:30 )  WBC Count : 3.56 K/uL  Hemoglobin : 13.0 g/dL  Hematocrit : 37.1 %  Platelet Count - Automated : 206 K/uL  Mean Cell Volume : 98.1 fL  Mean Cell Hemoglobin : 34.4 pg  Mean Cell Hemoglobin Concentration : 35.0 g/dL  Auto Neutrophil # : 1.66 K/uL  Auto Lymphocyte # : 1.53 K/uL  Auto Monocyte # : 0.31 K/uL  Auto Eosinophil # : 0.03 K/uL  Auto Basophil # : 0.02 K/uL  Auto Neutrophil % : 46.6 %  Auto Lymphocyte % : 43.0 %  Auto Monocyte % : 8.7 %  Auto Eosinophil % : 0.8 %  Auto Basophil % : 0.6 %    BMP:    05-19 @ 04:30    Blood Urea Nitrogen - 10  Calcium - 9.3  Carbond Dioxide - 19  Chloride - 111  Creatinine - 0.8  Glucose - 83  Potassium - 3.9  Sodium - 142      Hemoglobin A1c -     Urine Culture:        COVID Labs  CRP:  <3 (05-19-22)    Procalcitonin, Serum: 0.02 ng/mL (05-19-22 @ 04:30)    D-Dimer:  165 ng/mL DDU (05-19-22 @ 04:30)    Ferritin, Serum: 158 ng/mL (05-19-22 @ 09:00)          Imaging reviewed independently and reviewed read  < from: CT Heart without Coronaries w/ IV Cont (05.21.22 @ 11:40) >  IMPRESSION:    1.  Normal coronary arteries    2. Total coronary calcium score is 0.    CAD RAD: 0    < end of copied text >        MEDICATIONS  (STANDING):  chlorhexidine 4% Liquid 1 Application(s) Topical <User Schedule>  enoxaparin Injectable 40 milliGRAM(s) SubCutaneous every 24 hours  lactated ringers. 1000 milliLiter(s) (100 mL/Hr) IV Continuous <Continuous>  pantoprazole   Suspension 40 milliGRAM(s) Oral two times a day  polyethylene glycol 3350 17 Gram(s) Oral daily  regadenoson Injectable 0.4 milliGRAM(s) IV Push once  senna 2 Tablet(s) Oral at bedtime  topiramate 300 milliGRAM(s) Oral daily    MEDICATIONS  (PRN):  acetaminophen     Tablet .. 650 milliGRAM(s) Oral every 6 hours PRN Mild Pain (1 - 3)  aluminum hydroxide/magnesium hydroxide/simethicone Suspension 30 milliLiter(s) Oral every 6 hours PRN Dyspepsia  ibuprofen  Tablet. 600 milliGRAM(s) Oral every 6 hours PRN Temp greater or equal to 38C (100.4F), Mild Pain (1 - 3)  ondansetron Injectable 4 milliGRAM(s) IV Push every 8 hours PRN Nausea and/or Vomiting  sucralfate 1 Gram(s) Oral four times a day PRN dyspepsia

## 2022-05-21 NOTE — PROGRESS NOTE ADULT - SUBJECTIVE AND OBJECTIVE BOX
INTERVAL HPI/OVERNIGHT EVENTS:  remains bradycardic and symptomatic, dizziness with any activities  Echo with normal EF  CCTA normal coronaries  No telemetry available to review, per RN, monitor mulfunction      MEDICATIONS  (STANDING):  chlorhexidine 4% Liquid 1 Application(s) Topical <User Schedule>  enoxaparin Injectable 40 milliGRAM(s) SubCutaneous every 24 hours  lactated ringers. 1000 milliLiter(s) (100 mL/Hr) IV Continuous <Continuous>  pantoprazole   Suspension 40 milliGRAM(s) Oral two times a day  polyethylene glycol 3350 17 Gram(s) Oral daily  regadenoson Injectable 0.4 milliGRAM(s) IV Push once  senna 2 Tablet(s) Oral at bedtime  topiramate 300 milliGRAM(s) Oral daily    MEDICATIONS  (PRN):  acetaminophen     Tablet .. 650 milliGRAM(s) Oral every 6 hours PRN Mild Pain (1 - 3)  aluminum hydroxide/magnesium hydroxide/simethicone Suspension 30 milliLiter(s) Oral every 6 hours PRN Dyspepsia  ibuprofen  Tablet. 600 milliGRAM(s) Oral every 6 hours PRN Temp greater or equal to 38C (100.4F), Mild Pain (1 - 3)  ondansetron Injectable 4 milliGRAM(s) IV Push every 8 hours PRN Nausea and/or Vomiting  sucralfate 1 Gram(s) Oral four times a day PRN dyspepsia      Allergies    Originally Entered as [DYSPNEA] reaction to [sari] (Unknown)  penicillin (Other)    Intolerances        REVIEW OF SYSTEMS    [x ] A ten-point review of systems was otherwise negative except as noted.  [ ] Due to altered mental status/intubation, subjective information were not able to be obtained from the patient. History was obtained, to the extent possible, from review of the chart and collateral sources of information.      Vital Signs Last 24 Hrs  T(C): 36.3 (21 May 2022 08:06), Max: 36.4 (20 May 2022 18:57)  T(F): 97.4 (21 May 2022 08:06), Max: 97.5 (20 May 2022 18:57)  HR: 46 (21 May 2022 15:47) (37 - 50)  BP: 119/76 (21 May 2022 15:47) (101/65 - 120/74)  BP(mean): --  RR: 18 (21 May 2022 15:47) (18 - 18)  SpO2: 99% (21 May 2022 15:47) (98% - 100%)      PHYSICAL EXAM:    GENERAL: In no apparent distress, well nourished, and hydrated.  HEART: bradycardic rate and rhythm; No murmur; NO rubs, or gallops.  PULMONARY: Clear to auscultation and percussion.  Normal expansion/effort. No rales, wheezing, or rhonchi bilaterally.  ABDOMEN: Soft, Nontender, Nondistended; Bowel sounds present  EXTREMITIES:  Extremities warm, pink, well-perfused, 2+ Peripheral Pulses, No clubbing, cyanosis, or edema  NEUROLOGICAL: alert & oriented x 3, no focal deficits, PERRLA, EOMI    LABS:              COVID-19 PCR: NotDetec (05-20-22 @ 16:46)  COVID-19 PCR: Detected (05-18-22 @ 18:48)          RADIOLOGY & ADDITIONAL TESTS:    < from: CT Heart without Coronaries w/ IV Cont (05.21.22 @ 11:40) >  IMPRESSION:    1.  Normal coronary arteries    2. Total coronary calcium score is 0.    < end of copied text >    < from: TTE Echo Complete w/ Contrast w/ Doppler (05.21.22 @ 14:29) >  Summary:   1. Left ventricular ejection fraction, by visual estimation, is 55 to   60%.   2. Normal global left ventricular systolic function.   3. Normal left atrial size.   4. Normal right atrial size.   5. Mild mitral valve regurgitation.   6. Mild tricuspid regurgitation.    < end of copied text >

## 2022-05-22 LAB
ALBUMIN SERPL ELPH-MCNC: 3.9 G/DL — SIGNIFICANT CHANGE UP (ref 3.5–5.2)
ALBUMIN SERPL ELPH-MCNC: 4 G/DL — SIGNIFICANT CHANGE UP (ref 3.5–5.2)
ALP SERPL-CCNC: 59 U/L — SIGNIFICANT CHANGE UP (ref 30–115)
ALP SERPL-CCNC: 64 U/L — SIGNIFICANT CHANGE UP (ref 30–115)
ALT FLD-CCNC: 8 U/L — SIGNIFICANT CHANGE UP (ref 0–41)
ALT FLD-CCNC: 9 U/L — SIGNIFICANT CHANGE UP (ref 0–41)
ANION GAP SERPL CALC-SCNC: 11 MMOL/L — SIGNIFICANT CHANGE UP (ref 7–14)
ANION GAP SERPL CALC-SCNC: 8 MMOL/L — SIGNIFICANT CHANGE UP (ref 7–14)
AST SERPL-CCNC: 11 U/L — SIGNIFICANT CHANGE UP (ref 0–41)
AST SERPL-CCNC: 12 U/L — SIGNIFICANT CHANGE UP (ref 0–41)
BASOPHILS # BLD AUTO: 0.03 K/UL — SIGNIFICANT CHANGE UP (ref 0–0.2)
BASOPHILS NFR BLD AUTO: 0.8 % — SIGNIFICANT CHANGE UP (ref 0–1)
BILIRUB SERPL-MCNC: 0.2 MG/DL — SIGNIFICANT CHANGE UP (ref 0.2–1.2)
BILIRUB SERPL-MCNC: 0.4 MG/DL — SIGNIFICANT CHANGE UP (ref 0.2–1.2)
BUN SERPL-MCNC: 14 MG/DL — SIGNIFICANT CHANGE UP (ref 10–20)
BUN SERPL-MCNC: 14 MG/DL — SIGNIFICANT CHANGE UP (ref 10–20)
CALCIUM SERPL-MCNC: 9.3 MG/DL — SIGNIFICANT CHANGE UP (ref 8.5–10.1)
CALCIUM SERPL-MCNC: 9.7 MG/DL — SIGNIFICANT CHANGE UP (ref 8.5–10.1)
CHLORIDE SERPL-SCNC: 108 MMOL/L — SIGNIFICANT CHANGE UP (ref 98–110)
CHLORIDE SERPL-SCNC: 111 MMOL/L — HIGH (ref 98–110)
CO2 SERPL-SCNC: 21 MMOL/L — SIGNIFICANT CHANGE UP (ref 17–32)
CO2 SERPL-SCNC: 25 MMOL/L — SIGNIFICANT CHANGE UP (ref 17–32)
CREAT SERPL-MCNC: 0.8 MG/DL — SIGNIFICANT CHANGE UP (ref 0.7–1.5)
CREAT SERPL-MCNC: 0.9 MG/DL — SIGNIFICANT CHANGE UP (ref 0.7–1.5)
EGFR: 76 ML/MIN/1.73M2 — SIGNIFICANT CHANGE UP
EGFR: 88 ML/MIN/1.73M2 — SIGNIFICANT CHANGE UP
EOSINOPHIL # BLD AUTO: 0.05 K/UL — SIGNIFICANT CHANGE UP (ref 0–0.7)
EOSINOPHIL NFR BLD AUTO: 1.3 % — SIGNIFICANT CHANGE UP (ref 0–8)
GLUCOSE SERPL-MCNC: 83 MG/DL — SIGNIFICANT CHANGE UP (ref 70–99)
GLUCOSE SERPL-MCNC: 87 MG/DL — SIGNIFICANT CHANGE UP (ref 70–99)
HCT VFR BLD CALC: 35.5 % — LOW (ref 37–47)
HGB BLD-MCNC: 12.3 G/DL — SIGNIFICANT CHANGE UP (ref 12–16)
IMM GRANULOCYTES NFR BLD AUTO: 0.3 % — SIGNIFICANT CHANGE UP (ref 0.1–0.3)
LYMPHOCYTES # BLD AUTO: 1.67 K/UL — SIGNIFICANT CHANGE UP (ref 1.2–3.4)
LYMPHOCYTES # BLD AUTO: 42.3 % — SIGNIFICANT CHANGE UP (ref 20.5–51.1)
MAGNESIUM SERPL-MCNC: 2 MG/DL — SIGNIFICANT CHANGE UP (ref 1.8–2.4)
MCHC RBC-ENTMCNC: 34.4 PG — HIGH (ref 27–31)
MCHC RBC-ENTMCNC: 34.6 G/DL — SIGNIFICANT CHANGE UP (ref 32–37)
MCV RBC AUTO: 99.2 FL — HIGH (ref 81–99)
MONOCYTES # BLD AUTO: 0.31 K/UL — SIGNIFICANT CHANGE UP (ref 0.1–0.6)
MONOCYTES NFR BLD AUTO: 7.8 % — SIGNIFICANT CHANGE UP (ref 1.7–9.3)
NEUTROPHILS # BLD AUTO: 1.88 K/UL — SIGNIFICANT CHANGE UP (ref 1.4–6.5)
NEUTROPHILS NFR BLD AUTO: 47.5 % — SIGNIFICANT CHANGE UP (ref 42.2–75.2)
NRBC # BLD: 0 /100 WBCS — SIGNIFICANT CHANGE UP (ref 0–0)
PHOSPHATE SERPL-MCNC: 4 MG/DL — SIGNIFICANT CHANGE UP (ref 2.1–4.9)
PLATELET # BLD AUTO: 285 K/UL — SIGNIFICANT CHANGE UP (ref 130–400)
POTASSIUM SERPL-MCNC: 4.5 MMOL/L — SIGNIFICANT CHANGE UP (ref 3.5–5)
POTASSIUM SERPL-MCNC: 4.8 MMOL/L — SIGNIFICANT CHANGE UP (ref 3.5–5)
POTASSIUM SERPL-SCNC: 4.5 MMOL/L — SIGNIFICANT CHANGE UP (ref 3.5–5)
POTASSIUM SERPL-SCNC: 4.8 MMOL/L — SIGNIFICANT CHANGE UP (ref 3.5–5)
PROT SERPL-MCNC: 6 G/DL — SIGNIFICANT CHANGE UP (ref 6–8)
PROT SERPL-MCNC: 6.2 G/DL — SIGNIFICANT CHANGE UP (ref 6–8)
RBC # BLD: 3.58 M/UL — LOW (ref 4.2–5.4)
RBC # FLD: 10.6 % — LOW (ref 11.5–14.5)
SODIUM SERPL-SCNC: 141 MMOL/L — SIGNIFICANT CHANGE UP (ref 135–146)
SODIUM SERPL-SCNC: 143 MMOL/L — SIGNIFICANT CHANGE UP (ref 135–146)
WBC # BLD: 3.95 K/UL — LOW (ref 4.8–10.8)
WBC # FLD AUTO: 3.95 K/UL — LOW (ref 4.8–10.8)

## 2022-05-22 PROCEDURE — 99232 SBSQ HOSP IP/OBS MODERATE 35: CPT

## 2022-05-22 PROCEDURE — 99233 SBSQ HOSP IP/OBS HIGH 50: CPT

## 2022-05-22 RX ORDER — ONDANSETRON 8 MG/1
4 TABLET, FILM COATED ORAL ONCE
Refills: 0 | Status: DISCONTINUED | OUTPATIENT
Start: 2022-05-22 | End: 2022-05-24

## 2022-05-22 RX ADMIN — CHLORHEXIDINE GLUCONATE 1 APPLICATION(S): 213 SOLUTION TOPICAL at 05:51

## 2022-05-22 RX ADMIN — SENNA PLUS 2 TABLET(S): 8.6 TABLET ORAL at 21:46

## 2022-05-22 RX ADMIN — Medication 650 MILLIGRAM(S): at 10:00

## 2022-05-22 RX ADMIN — ENOXAPARIN SODIUM 40 MILLIGRAM(S): 100 INJECTION SUBCUTANEOUS at 18:08

## 2022-05-22 RX ADMIN — Medication 600 MILLIGRAM(S): at 10:00

## 2022-05-22 RX ADMIN — Medication 300 MILLIGRAM(S): at 12:53

## 2022-05-22 RX ADMIN — PANTOPRAZOLE SODIUM 40 MILLIGRAM(S): 20 TABLET, DELAYED RELEASE ORAL at 18:08

## 2022-05-22 RX ADMIN — PANTOPRAZOLE SODIUM 40 MILLIGRAM(S): 20 TABLET, DELAYED RELEASE ORAL at 05:50

## 2022-05-22 RX ADMIN — Medication 30 MILLILITER(S): at 15:49

## 2022-05-22 RX ADMIN — Medication 600 MILLIGRAM(S): at 10:24

## 2022-05-22 RX ADMIN — Medication 650 MILLIGRAM(S): at 21:46

## 2022-05-22 RX ADMIN — Medication 650 MILLIGRAM(S): at 09:37

## 2022-05-22 NOTE — PROGRESS NOTE ADULT - TIME BILLING
as above
Symptomatic bradycardia  - echo  - CTA pending  - may require a PPM implant based on the results
as above
bradycardia  - Stress test to evalute for chronotropic inc  - MRI to evaluate for inf disease
#Progress Note Handoff  Pending (specify):  follow up stress test, neuro, cardiology, EP, pt still bradycardic   Family discussion: house staff updated pt family  Disposition: cardiac telemonitoring, isolation DCed, anticipated 48 hrs

## 2022-05-22 NOTE — PROGRESS NOTE ADULT - ASSESSMENT
51 year old female patient with Epilepsy and Raynaud Phenomenon who presented to the ED on 05/18 for evaluation of weakness in setting of recent COVID positive test at home, fever, chills, diarrhea, and cough, found to have sinus bradycardia but otherwise insignificant chest and abdominal imaging, to be admitted for further investigations, management, and monitoring. Currently still in sinus bradycardia around 50bpm.    # Generalized weakness and dizziness in setting of sinus bradycardia  * Sinus Bradycardia (in Setting of Recent COVID Positive Test + On Diltiazem for Raynaud)  History of Sinus Bradycardia After First Pfizer Vaccine Dose while being on Diltiazem  * History of Sinus Bradycardia down to HR 30's bpm few months ago after 1st dose of Pfizer Vaccine s/p Admission to [facility] s/p discharge off Diltiazem (but has resumed it herself) and advised against taking 2nd COVID vaccine/ booster  * Cardiology evaluation: per patient, she has been evaluated at [facility] and etiology was thought to be related to 1st dose of Pfizer vaccine (asked to avoid further shots) s/p discharge of Diltiazem 120mg QD (but patient resumed it at home)  - Holding AVN blocking agents   - If patient continues to be leidy off AVN agents will discuss option of PPM as per EP  - Can c/w topiramate as per neuro (known to cause bradycardia, neuro thinks unlikely)  - Echo EF 55-60%  - f/u NST to r/o ischemia and for chronotropic incompetence  - Cont tele monitoring  - Monitor electrolytes, maintain WNL  -Patient feels better today and started to walk around the unit.  Spoke to electrophysiology today and they recommended MRI cardiac with and without IV contrast to rule out any infiltrative disease.  Also as the patient was walking around the unit very well they recommended the patient have a exercise stress test to assess for chronotropic effect.  EP to continue to follow the patient continue the patient on cardiac telemetry.      # Diarrhea and nausea- Resolved  - CT AP without contrast no intraabdominal process  - S/P 1L NS and Zofran in ED  - c/w IV fluid  - Check orthostatics and monitor BP  - Monitor nausea and keep score at 01/10: start IV Zofran 4mg Q8h PRN  - c/w Protonix, Carafate, and Aluminum hydroxide PRN for dyspepsia  - Monitor BM for recurrence of diarrhea: last episodes x3 on 05/18  - Check C difficile PCR and toxin in setting of recent Ab use for UTI last month for 7-10 days (unsure about name of Ab)  - Avoid laxatives pending infectious workup  - ESR 8,f/u CRP, Lactoferrin  -Patient now was constipated and requesting something to help with her constipation.  We will start her on MiraLAX.    COVID positive- resolved  * Onset: 05/09: fever x3 days last week M-W, chills, dry cough, diarrhea since last week (improving), abdominal discomfort (no sore throat or runny nose)  * Vaccinated against COVID: 1x Pfizer only due to complication as above  * ED HR went down to 38bpm (asymptomatic; symptoms stable since 2 weeks)  * ED Labs WBC 2.91  * CXR no acute process  - Infectious Disease team consulted  - Monitor for fever: afebrile in last 24 hours  - Trend WBC  - Monitor SaO2 and Oxygen Requirements: on RA   - Trend Inflammatory Markers:  - Follow up blood cultures   - Send urine legionella and strep  - Check RVP and influenza  - Monitor off antimicrobials or steroids  - Anticoagulation per protocol  - Isolation DCed as >10 days  -Repeat COVID test was negative for COVID.      Epilepsy and trigeminal neuralgia   * Free of seizures for a long time  * Follows with Dr Angel  * On Topiramate 300mg QD  - Outpatient follow up with Dr Angel  - Resume Topiramate 300mg QD      Raynaud Syndrome  * Follows with Dr Bullard  * On Diltiazem 120mg   - Hold Diltiazem 120mg QD and follow up outpatient with Dr Bullard post discharge      Others  - DVT Prophylaxis: Lovenox 40mg Subcutaneously daily  - GI Prophylaxis: Pantoprazole 40mg PO QD  - Diet: NPO as above  - Code Status: Full    #Progress Note Handoff  Pending (specify): Follow-up telemetry.  Cardiac MRI, Excersice stress test  Family discussion: house staff updated pt family  Disposition: cardiac telemonitoring, anticipated 24-48 hrs. patient has no needs. Cardiac MRI and stress test

## 2022-05-22 NOTE — PROGRESS NOTE ADULT - SUBJECTIVE AND OBJECTIVE BOX
INTERVAL HPI/OVERNIGHT EVENTS:  still bradycardic 38-45, better this am  symptoms somewhat less this am  ambulated with HR 76  could not ambulate for long time due to fatigue    MEDICATIONS  (STANDING):  chlorhexidine 4% Liquid 1 Application(s) Topical <User Schedule>  enoxaparin Injectable 40 milliGRAM(s) SubCutaneous every 24 hours  lactated ringers. 1000 milliLiter(s) (100 mL/Hr) IV Continuous <Continuous>  pantoprazole   Suspension 40 milliGRAM(s) Oral two times a day  polyethylene glycol 3350 17 Gram(s) Oral daily  regadenoson Injectable 0.4 milliGRAM(s) IV Push once  senna 2 Tablet(s) Oral at bedtime  topiramate 300 milliGRAM(s) Oral daily    MEDICATIONS  (PRN):  acetaminophen     Tablet .. 650 milliGRAM(s) Oral every 6 hours PRN Mild Pain (1 - 3)  aluminum hydroxide/magnesium hydroxide/simethicone Suspension 30 milliLiter(s) Oral every 6 hours PRN Dyspepsia  ibuprofen  Tablet. 600 milliGRAM(s) Oral every 6 hours PRN Temp greater or equal to 38C (100.4F), Mild Pain (1 - 3)  ondansetron Injectable 4 milliGRAM(s) IV Push every 8 hours PRN Nausea and/or Vomiting  sucralfate 1 Gram(s) Oral four times a day PRN dyspepsia      Allergies    Originally Entered as [DYSPNEA] reaction to [sari] (Unknown)  penicillin (Other)    Intolerances        REVIEW OF SYSTEMS    [x ] A ten-point review of systems was otherwise negative except as noted.  [ ] Due to altered mental status/intubation, subjective information were not able to be obtained from the patient. History was obtained, to the extent possible, from review of the chart and collateral sources of information.      Vital Signs Last 24 Hrs  T(C): 36 (22 May 2022 12:30), Max: 36.9 (21 May 2022 21:35)  T(F): 96.8 (22 May 2022 12:30), Max: 98.5 (21 May 2022 21:35)  HR: 53 (22 May 2022 12:30) (39 - 53)  BP: 100/54 (22 May 2022 12:30) (97/60 - 119/76)  BP(mean): --  RR: 18 (22 May 2022 12:30) (18 - 18)  SpO2: 98% (21 May 2022 18:38) (98% - 99%)    05-21-22 @ 07:01  -  05-22-22 @ 07:00  --------------------------------------------------------  IN: 1340 mL / OUT: 0 mL / NET: 1340 mL    05-22-22 @ 07:01  -  05-22-22 @ 13:22  --------------------------------------------------------  IN: 298 mL / OUT: 0 mL / NET: 298 mL        PHYSICAL EXAM:    GENERAL: In no apparent distress, well nourished, and hydrated.  HEART: Regular rate and rhythm; No murmur; NO rubs, or gallops.  PULMONARY: Clear to auscultation and percussion.  Normal expansion/effort. No rales, wheezing, or rhonchi bilaterally.  ABDOMEN: Soft, Nontender, Nondistended; Bowel sounds present  EXTREMITIES:  Extremities warm, pink, well-perfused, 2+ Peripheral Pulses, No clubbing, cyanosis, or edema  NEUROLOGICAL: alert & oriented x 3, no focal deficits, PERRLA, EOMI    LABS:                        12.3   3.95  )-----------( 285      ( 22 May 2022 06:29 )             35.5     05-22    141  |  108  |  14  ----------------------------<  83  4.8   |  25  |  0.8    Ca    9.3      22 May 2022 06:29  Phos  4.0     05-22  Mg     2.0     05-22    TPro  6.0  /  Alb  3.9  /  TBili  0.4  /  DBili  x   /  AST  11  /  ALT  8   /  AlkPhos  59  05-22        COVID-19 PCR: NotDetec (05-20-22 @ 16:46)          RADIOLOGY & ADDITIONAL TESTS:

## 2022-05-22 NOTE — PROGRESS NOTE ADULT - SUBJECTIVE AND OBJECTIVE BOX
Pt seen and examined at bedside. No CP or SOB. Pt feeling better today. Tele reviewed pt remains to be bradycardic     PAST MEDICAL & SURGICAL HISTORY:  H/O Raynaud&#x27;s syndrome    Epilepsy    No significant past surgical history        VITAL SIGNS (Last 24 hrs):  T(C): 36 (22 @ 12:30), Max: 36.9 (22 @ 21:35)  HR: 53 (22 @ 12:30) (39 - 53)  BP: 100/54 (22 @ 12:30) (97/60 - 113/70)  RR: 18 (22 @ 12:30) (18 - 18)  SpO2: 98% (22 @ 18:38) (98% - 98%)  Wt(kg): --  Daily Height in cm: 165.1 (21 May 2022 21:35)    Daily Weight in k.6 (22 May 2022 05:20)    I&O's Summary    21 May 2022 07:01  -  22 May 2022 07:00  --------------------------------------------------------  IN: 1340 mL / OUT: 0 mL / NET: 1340 mL    22 May 2022 07:01  -  22 May 2022 18:01  --------------------------------------------------------  IN: 298 mL / OUT: 0 mL / NET: 298 mL        PHYSICAL EXAM:  GENERAL: NAD, well-developed  HEAD:  Atraumatic, Normocephalic  EYES: EOMI, PERRLA, conjunctiva and sclera clear  NECK: Supple, No JVD  CHEST/LUNG: Clear to auscultation bilaterally; No wheeze  HEART: Regular rate and rhythm; No murmurs, rubs, or gallops  ABDOMEN: Soft, Nontender, Nondistended; Bowel sounds present  EXTREMITIES:  2+ Peripheral Pulses, No clubbing, cyanosis, or edema  PSYCH: AAOx3  NEUROLOGY: non-focal, ambulating   SKIN: No rashes or lesions    Labs Reviewed  Spoke to patient in regards to abnormal labs.    CBC Full  -  ( 22 May 2022 06:29 )  WBC Count : 3.95 K/uL  Hemoglobin : 12.3 g/dL  Hematocrit : 35.5 %  Platelet Count - Automated : 285 K/uL  Mean Cell Volume : 99.2 fL  Mean Cell Hemoglobin : 34.4 pg  Mean Cell Hemoglobin Concentration : 34.6 g/dL  Auto Neutrophil # : 1.88 K/uL  Auto Lymphocyte # : 1.67 K/uL  Auto Monocyte # : 0.31 K/uL  Auto Eosinophil # : 0.05 K/uL  Auto Basophil # : 0.03 K/uL  Auto Neutrophil % : 47.5 %  Auto Lymphocyte % : 42.3 %  Auto Monocyte % : 7.8 %  Auto Eosinophil % : 1.3 %  Auto Basophil % : 0.8 %    BMP:     @ 06:29    Blood Urea Nitrogen - 14  Calcium - 9.3  Carbond Dioxide - 25  Chloride - 108  Creatinine - 0.8  Glucose - 83  Potassium - 4.8  Sodium - 141      Hemoglobin A1c -     Urine Culture:        COVID Labs  CRP:  <3 (22)    Procalcitonin, Serum: 0.02 ng/mL (22 @ 04:30)    D-Dimer:  165 ng/mL DDU (22 @ 04:30)    Ferritin, Serum: 158 ng/mL (22 @ 09:00)          Imaging reviewed independently and reviewed read  < from: TTE Echo Complete w/ Contrast w/ Doppler (22 @ 14:29) >  Summary:   1. Left ventricular ejection fraction, by visual estimation, is 55 to   60%.   2. Normal global left ventricular systolic function.   3. Normal left atrial size.   4. Normal right atrial size.   5. Mild mitral valve regurgitation.   6. Mild tricuspid regurgitation.    < end of copied text >        MEDICATIONS  (STANDING):  chlorhexidine 4% Liquid 1 Application(s) Topical <User Schedule>  enoxaparin Injectable 40 milliGRAM(s) SubCutaneous every 24 hours  lactated ringers. 1000 milliLiter(s) (100 mL/Hr) IV Continuous <Continuous>  pantoprazole   Suspension 40 milliGRAM(s) Oral two times a day  polyethylene glycol 3350 17 Gram(s) Oral daily  regadenoson Injectable 0.4 milliGRAM(s) IV Push once  senna 2 Tablet(s) Oral at bedtime  topiramate 300 milliGRAM(s) Oral daily    MEDICATIONS  (PRN):  acetaminophen     Tablet .. 650 milliGRAM(s) Oral every 6 hours PRN Mild Pain (1 - 3)  aluminum hydroxide/magnesium hydroxide/simethicone Suspension 30 milliLiter(s) Oral every 6 hours PRN Dyspepsia  ibuprofen  Tablet. 600 milliGRAM(s) Oral every 6 hours PRN Temp greater or equal to 38C (100.4F), Mild Pain (1 - 3)  ondansetron Injectable 4 milliGRAM(s) IV Push every 8 hours PRN Nausea and/or Vomiting  sucralfate 1 Gram(s) Oral four times a day PRN dyspepsia

## 2022-05-22 NOTE — PROGRESS NOTE ADULT - ASSESSMENT
51 year old female patient with Epilepsy and Raynaud Phenomenon who presented to the ED on 05/18 for evaluation of weakness  Recent COVID infection  in symptomatic sinus bradycardia    symptomatic leidy  recent COVID infection  Epilepsy  Raynoud     con't tele  ambulate as tolerate  Maintain electrolytes K>4.0 Mg >2.0  no AVN blockers  CMI to r/o infiltrative disease  re-attempt exercise stress test to assess for chronotropic incompetence   will re-assess need for PPM pending tests results  will follow up

## 2022-05-23 LAB — TOPIRAMATE SERPL-MCNC: 19 MCG/ML — SIGNIFICANT CHANGE UP

## 2022-05-23 PROCEDURE — 93018 CV STRESS TEST I&R ONLY: CPT

## 2022-05-23 PROCEDURE — 93016 CV STRESS TEST SUPVJ ONLY: CPT

## 2022-05-23 PROCEDURE — 99233 SBSQ HOSP IP/OBS HIGH 50: CPT

## 2022-05-23 RX ORDER — ALPRAZOLAM 0.25 MG
2 TABLET ORAL AT BEDTIME
Refills: 0 | Status: DISCONTINUED | OUTPATIENT
Start: 2022-05-23 | End: 2022-05-24

## 2022-05-23 RX ORDER — LACTULOSE 10 G/15ML
20 SOLUTION ORAL ONCE
Refills: 0 | Status: COMPLETED | OUTPATIENT
Start: 2022-05-23 | End: 2022-05-23

## 2022-05-23 RX ADMIN — POLYETHYLENE GLYCOL 3350 17 GRAM(S): 17 POWDER, FOR SOLUTION ORAL at 11:40

## 2022-05-23 RX ADMIN — Medication 600 MILLIGRAM(S): at 06:08

## 2022-05-23 RX ADMIN — LACTULOSE 20 GRAM(S): 10 SOLUTION ORAL at 23:52

## 2022-05-23 RX ADMIN — SENNA PLUS 2 TABLET(S): 8.6 TABLET ORAL at 22:47

## 2022-05-23 RX ADMIN — Medication 300 MILLIGRAM(S): at 11:39

## 2022-05-23 RX ADMIN — Medication 600 MILLIGRAM(S): at 05:17

## 2022-05-23 RX ADMIN — PANTOPRAZOLE SODIUM 40 MILLIGRAM(S): 20 TABLET, DELAYED RELEASE ORAL at 17:31

## 2022-05-23 RX ADMIN — PANTOPRAZOLE SODIUM 40 MILLIGRAM(S): 20 TABLET, DELAYED RELEASE ORAL at 05:17

## 2022-05-23 RX ADMIN — Medication 2 MILLIGRAM(S): at 22:47

## 2022-05-23 RX ADMIN — CHLORHEXIDINE GLUCONATE 1 APPLICATION(S): 213 SOLUTION TOPICAL at 05:18

## 2022-05-23 RX ADMIN — ENOXAPARIN SODIUM 40 MILLIGRAM(S): 100 INJECTION SUBCUTANEOUS at 17:31

## 2022-05-23 NOTE — PROGRESS NOTE ADULT - SUBJECTIVE AND OBJECTIVE BOX
SUBJECTIVE:    Patient is a 51y old Female who presents with a chief complaint of Weakness (22 May 2022 18:01)    Currently admitted to medicine with the primary diagnosis of COVID       Today is hospital day 5d. This morning she is resting comfortably in bed and reports no new issues or overnight events.       PAST MEDICAL & SURGICAL HISTORY  H/O Raynaud&#x27;s syndrome    Epilepsy    No significant past surgical history      SOCIAL HISTORY:    ALLERGIES:  Originally Entered as [DYSPNEA] reaction to [sari] (Unknown)  penicillin (Other)    MEDICATIONS:  STANDING MEDICATIONS  chlorhexidine 4% Liquid 1 Application(s) Topical <User Schedule>  enoxaparin Injectable 40 milliGRAM(s) SubCutaneous every 24 hours  lactated ringers. 1000 milliLiter(s) IV Continuous <Continuous>  ondansetron Injectable 4 milliGRAM(s) IV Push once  pantoprazole   Suspension 40 milliGRAM(s) Oral two times a day  polyethylene glycol 3350 17 Gram(s) Oral daily  senna 2 Tablet(s) Oral at bedtime  topiramate 300 milliGRAM(s) Oral daily    PRN MEDICATIONS  acetaminophen     Tablet .. 650 milliGRAM(s) Oral every 6 hours PRN  aluminum hydroxide/magnesium hydroxide/simethicone Suspension 30 milliLiter(s) Oral every 6 hours PRN  ibuprofen  Tablet. 600 milliGRAM(s) Oral every 6 hours PRN  ondansetron Injectable 4 milliGRAM(s) IV Push every 8 hours PRN  sucralfate 1 Gram(s) Oral four times a day PRN    VITALS:   T(F): 96.6  HR: 54  BP: 111/67  RR: 18  SpO2: --    LABS:  Negative for smoking/alcohol/drug use.                         12.3   3.95  )-----------( 285      ( 22 May 2022 06:29 )             35.5     05-22    141  |  108  |  14  ----------------------------<  83  4.8   |  25  |  0.8    Ca    9.3      22 May 2022 06:29  Phos  4.0     05-22  Mg     2.0     05-22    TPro  6.0  /  Alb  3.9  /  TBili  0.4  /  DBili  x   /  AST  11  /  ALT  8   /  AlkPhos  59  05-22        RADIOLOGY:  CT Heart without Coronaries w/ IV Cont (05.21.22 @ 11:40)   IMPRESSION:  1.  Normal coronary arteries  2. Total coronary calcium score is 0.      CT Head No Cont (05.20.22 @ 12:09)   IMPRESSION:  No acute intracranial pathology. No evidence of midline shift, mass effect or intracranial hemorrhage.  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      VA Duplex Lower Ext Vein Scan, Bilat (05.19.22 @ 11:14)   IMPRESSION:  No evidence of deep venous thrombosis in either lower extremity.      CT Abdomen and Pelvis No Cont (05.18.22 @ 17:27)   IMPRESSION:  No definite acute abnormality in the abdomen and pelvis.        PHYSICAL EXAM:  GEN: Pt was seen and examined at bedside. Pleasant  HEENT: normocephalic, atraumatic, aniceteric  LUNGS: Clear to auscultation bilaterally, no rales/wheezing/ rhonchi  HEART: S1/S2 present. RRR, no murmurs  ABD: Soft, non-tender, non-distended. Bowel sounds present  EXT: No LE edema, moves all extremities  NEURO: Alert and awake, follow commands, normal affect      ASSESSMENT AND PLAN:  51 year old female patient with Epilepsy and Raynaud Phenomenon who presented to the ED on 05/18 for evaluation of weakness in setting of recent COVID positive test at home, fever, chills, diarrhea, and cough, found to have sinus bradycardia but otherwise insignificant chest and abdominal imaging, to be admitted for further investigations, management, and monitoring. Currently still in sinus bradycardia around 50bpm.    Weakness in Setting of Diarrhea  Rule Out Clostridium Difficile Infection in Setting of Recent Ab Use for UTI  * Onset: 05/09: fever x3 days last week M-W, chills, dry cough, diarrhea since last week (improving), abdominal discomfort (no sore throat or runny nose)  * Recent AB use for UTI last month for 7-10 days (unsure about name of Ab)  * /68 mmHg, HR down to 38 bpm -> currently 50 bpm, T 36.1, SaO2 98% on RA  * CXR no acute process  * CT AP without contrast no intraabdominal process  * S/P 1L NS and Zofran in ED    - Consider GI consult if symptoms do not improve  - Monitor T  - Trend WBC  - Start IV Fluid hydration with LR at 100mL/hour. Keep NPO for now. S/P 1L NS in ED  - Check orthostatics and monitor BP  - Monitor nausea and keep score at 01/10: start IV Zofran 4mg Q8h PRN  - Start Protonix, Carafate, and Aluminum hydroxide PRN for dyspepsia  - Monitor off Abs  - Monitor BM for recurrence of diarrhea: last episodes x3 on 05/18  - Check C difficile PCR and toxin in setting of recent Ab use for UTI last month for 7-10 days (unsure about name of Ab)  - Please send stool cultures, GI PCR  - Avoid laxatives pending infectious workup  - Check ESR, CRP, Lactoferrin  - PT/OT evaluation    Sinus Bradycardia (in Setting of Recent COVID Positive Test + On Diltiazem for Raynaud)  History of Sinus Bradycardia After First Pfizer Vaccine Dose while being on Diltiazem  * History of Sinus Bradycardia down to HR 30's bpm few months ago after 1st dose of Pfizer Vaccine s/p Admission to [facility] s/p discharge off Diltiazem (but has resumed it herself) and advised against taking 2nd COVID vaccine/ booster  * Home med Diltiazem 120mg for Raynaud  * ED HR went down to 38bpm (asymptomatic; symptoms stable since 2 weeks)  * ECG sinus bradycardia with no block    - Cardiology evaluation: per patient, she has been evaluated at [facility] and etiology was thought to be related to 1st dose of Pfizer vaccine (asked to avoid further shots) s/p discharge of Diltiazem 120mg QD (but patient resumed it at home)  - Hold Diltiazem 120mg QD and follow up outpatient with Dr Bullard post discharge  - Avoid Beta blocking agents  - Keep atropine at bedside to be used for symptomatic bradycardia or very low HR  - Monitor tele  - Monitor HR  - Check TSH  - Will send lyme  - Check TTE      Recent COVID Positive Home Test  Pending COVID PCR 05/18  Leukopenia  * Onset: 05/09: fever x3 days last week M-W, chills, dry cough, diarrhea since last week (improving), abdominal discomfort (no sore throat or runny nose)  * Vaccinated against COVID: 1x Pfizer only due to complication as above  * ED HR went down to 38bpm (asymptomatic; symptoms stable since 2 weeks)  * ED Labs WBC 2.91  * SARS-COV2: received  * CXR no acute process  * Markers as below    - Infectious Disease team consulted  - Monitor for fever: afebrile in last 24 hours  - Trend WBC  - Monitor SaO2 and Oxygen Requirements: on RA  - Follow up Chest X Ray    - Trend Inflammatory Markers:  --> ESR   --> D-dimer; check VA duplex venous LE  --> Procalcitonin   --> C-reactive protein  --> LDH   --> Ferritin   --> Fibrinogen  - Follow up blood cultures   - Send urine legionella and strep  - Check RVP and influenza  - Monitor off antimicrobials or steroids  - Anticoagulation per protocol      Epilepsy  * Free of seizures for a long time  * Follows with Dr Angel  * On Topiramate 300mg QD  - Outpatient follow up with Dr Angel  - Resume Topiramate 300mg QD  - Follow up AED level in AM      Raynaud Syndrome  * Follows with Dr Bullard  * On Diltiazem 120mg   - Hold Diltiazem 120mg QD and follow up outpatient with Dr Bullard post discharge      Others  - DVT Prophylaxis: Lovenox 40mg Subcutaneously daily  - GI Prophylaxis: Pantoprazole 40mg PO QD  - Diet: NPO as above  - Code Status: Full SUBJECTIVE:    Patient is a 51y old Female who presents with a chief complaint of Weakness (22 May 2022 18:01)    Currently admitted to medicine with the primary diagnosis of COVID       Today is hospital day 5d. This morning she is resting comfortably in bed and reports no new issues or overnight events.       PAST MEDICAL & SURGICAL HISTORY  H/O Raynaud&#x27;s syndrome    Epilepsy    No significant past surgical history      SOCIAL HISTORY:    ALLERGIES:  Originally Entered as [DYSPNEA] reaction to [sari] (Unknown)  penicillin (Other)    MEDICATIONS:  STANDING MEDICATIONS  chlorhexidine 4% Liquid 1 Application(s) Topical <User Schedule>  enoxaparin Injectable 40 milliGRAM(s) SubCutaneous every 24 hours  lactated ringers. 1000 milliLiter(s) IV Continuous <Continuous>  ondansetron Injectable 4 milliGRAM(s) IV Push once  pantoprazole   Suspension 40 milliGRAM(s) Oral two times a day  polyethylene glycol 3350 17 Gram(s) Oral daily  senna 2 Tablet(s) Oral at bedtime  topiramate 300 milliGRAM(s) Oral daily    PRN MEDICATIONS  acetaminophen     Tablet .. 650 milliGRAM(s) Oral every 6 hours PRN  aluminum hydroxide/magnesium hydroxide/simethicone Suspension 30 milliLiter(s) Oral every 6 hours PRN  ibuprofen  Tablet. 600 milliGRAM(s) Oral every 6 hours PRN  ondansetron Injectable 4 milliGRAM(s) IV Push every 8 hours PRN  sucralfate 1 Gram(s) Oral four times a day PRN    VITALS:   T(F): 96.6  HR: 54  BP: 111/67  RR: 18  SpO2: --    LABS:  Negative for smoking/alcohol/drug use.                         12.3   3.95  )-----------( 285      ( 22 May 2022 06:29 )             35.5     05-22    141  |  108  |  14  ----------------------------<  83  4.8   |  25  |  0.8    Ca    9.3      22 May 2022 06:29  Phos  4.0     05-22  Mg     2.0     05-22    TPro  6.0  /  Alb  3.9  /  TBili  0.4  /  DBili  x   /  AST  11  /  ALT  8   /  AlkPhos  59  05-22        RADIOLOGY:  CT Heart without Coronaries w/ IV Cont (05.21.22 @ 11:40)   IMPRESSION:  1.  Normal coronary arteries  2. Total coronary calcium score is 0.      CT Head No Cont (05.20.22 @ 12:09)   IMPRESSION:  No acute intracranial pathology. No evidence of midline shift, mass effect or intracranial hemorrhage.  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      VA Duplex Lower Ext Vein Scan, Bilat (05.19.22 @ 11:14)   IMPRESSION:  No evidence of deep venous thrombosis in either lower extremity.      CT Abdomen and Pelvis No Cont (05.18.22 @ 17:27)   IMPRESSION:  No definite acute abnormality in the abdomen and pelvis.        PHYSICAL EXAM:  GEN: Pt was seen and examined at bedside. Pleasant  HEENT: normocephalic, atraumatic, aniceteric  LUNGS: Clear to auscultation bilaterally, no rales/wheezing/ rhonchi  HEART: S1/S2 present. RRR, no murmurs  ABD: Soft, non-tender, non-distended. Bowel sounds present  EXT: No LE edema, moves all extremities  NEURO: Alert and awake, follow commands, normal affect      ASSESSMENT AND PLAN:  51 year old female patient with Epilepsy and Raynaud Phenomenon who presented to the ED on 05/18 for evaluation of weakness in setting of recent COVID positive test at home, fever, chills, diarrhea, and cough, found to have sinus bradycardia but otherwise insignificant chest and abdominal imaging, to be admitted for further investigations, management, and monitoring. Currently still in sinus bradycardia around 50bpm.    #Weakness in Setting of Diarrhea, Diarrhea resolved  -Onset: 05/09: fever x3 days last week M-W, chills, dry cough, diarrhea since last week (improving), abdominal discomfort (no sore throat or runny nose)  -Recent AB use for UTI last month for 7-10 days (unsure about name of Ab)  -/68 mmHg, HR down to 38 bpm -> currently 50 bpm, T 36.1, SaO2 98% on RA  -CXR no acute process  -CT AP without contrast no intraabdominal process  -S/P 1L NS and Zofran in ED    #Sinus Bradycardia (in Setting of Recent COVID Positive Test + On Diltiazem for Raynaud)  #Weakness/Lightheadedness, resolving  #History of Sinus Bradycardia After First Pfizer Vaccine Dose while being on Diltiazem  -History of Sinus Bradycardia down to HR 30's bpm few months ago after 1st dose of Pfizer Vaccine s/p Admission to [facility] s/p discharge off Diltiazem (but has resumed it herself) and advised against taking 2nd COVID vaccine/ booster  -Home med Diltiazem 120mg for Raynaud  -ED HR went down to 38bpm (asymptomatic; symptoms stable since 2 weeks)  -ECG sinus bradycardia with no block  -Cardiology evaluation: per patient, she has been evaluated at [facility] and etiology was thought to be related to 1st dose of Pfizer vaccine (asked to avoid further shots) s/p discharge of Diltiazem 120mg QD (but patient resumed it at home)  -TSH:  wnl;   lyme negative  -Cardiac CT:  normal coronary arteries  -CTH:  negative for acute pathology    -continue Holding Diltiazem 120mg QD and follow up outpatient with Dr Bullard post discharge  -Avoid Beta blocking agents  -Keep atropine at bedside to be used for symptomatic bradycardia or very low HR  -Monitor tele  -Monitor HR  -Cardiac MRI to r/o infiltrative cardiac process and Exercise Stress test and f/u EP recs    #Weakness in Setting of Diarrhea, Diarrhea resolved  -Onset: 05/09: fever x3 days last week M-W, chills, dry cough, diarrhea since last week (improving), abdominal discomfort (no sore throat or runny nose)  -Recent AB use for UTI last month for 7-10 days (unsure about name of Ab)  -/68 mmHg, HR down to 38 bpm -> currently 50 bpm, T 36.1, SaO2 98% on RA  -CXR no acute process  -CT AP without contrast no intraabdominal process  -S/P 1L NS and Zofran in ED    #Recent COVID Positive Home Test  #COVID PCR 05/18, negative 5/20  #Leukopenia  -Onset: 05/09: fever x3 days last week M-W, chills, dry cough, diarrhea since last week (improving), abdominal discomfort (no sore throat or runny nose)  -Vaccinated against COVID: 1x Pfizer only due to complication as above  -ED HR went down to 38bpm (asymptomatic; symptoms stable since 2 weeks)  -ED Labs WBC 2.91  -CXR no acute process  -LE duplex: negative    -no sxs    #Epilepsy  -Free of seizures for a long time  -Follows with Dr Angel  -On Topiramate 300mg QD    -Outpatient follow up with Dr Angel  -c/w Topiramate 300mg QD    Raynaud Syndrome  -Follows with Dr Bullard  -On Diltiazem 120mg     -Hold Diltiazem 120mg QD and follow up outpatient with Dr Bullard post discharge    Others  - DVT Prophylaxis: Lovenox 40mg Subcutaneously daily  - GI Prophylaxis: Pantoprazole 40mg PO QD  - Diet: Gluten free diet  - Code Status: Full      Provider Handoff: (Pending) - follow up:  -Cardiac MRI to r/o infiltrative cardiac process and Exercise Stress test and f/u EP recs SUBJECTIVE:    Patient is a 51y old Female who presents with a chief complaint of Weakness (22 May 2022 18:01)    Currently admitted to medicine with the primary diagnosis of COVID       Today is hospital day 5d. This morning she is resting comfortably in bed and reports no new issues or overnight events.       PAST MEDICAL & SURGICAL HISTORY  H/O Raynaud&#x27;s syndrome    Epilepsy    No significant past surgical history      SOCIAL HISTORY:    ALLERGIES:  Originally Entered as [DYSPNEA] reaction to [sari] (Unknown)  penicillin (Other)    MEDICATIONS:  STANDING MEDICATIONS  chlorhexidine 4% Liquid 1 Application(s) Topical <User Schedule>  enoxaparin Injectable 40 milliGRAM(s) SubCutaneous every 24 hours  lactated ringers. 1000 milliLiter(s) IV Continuous <Continuous>  ondansetron Injectable 4 milliGRAM(s) IV Push once  pantoprazole   Suspension 40 milliGRAM(s) Oral two times a day  polyethylene glycol 3350 17 Gram(s) Oral daily  senna 2 Tablet(s) Oral at bedtime  topiramate 300 milliGRAM(s) Oral daily    PRN MEDICATIONS  acetaminophen     Tablet .. 650 milliGRAM(s) Oral every 6 hours PRN  aluminum hydroxide/magnesium hydroxide/simethicone Suspension 30 milliLiter(s) Oral every 6 hours PRN  ibuprofen  Tablet. 600 milliGRAM(s) Oral every 6 hours PRN  ondansetron Injectable 4 milliGRAM(s) IV Push every 8 hours PRN  sucralfate 1 Gram(s) Oral four times a day PRN    VITALS:   T(F): 96.6  HR: 54  BP: 111/67  RR: 18  SpO2: --    LABS:  Negative for smoking/alcohol/drug use.                         12.3   3.95  )-----------( 285      ( 22 May 2022 06:29 )             35.5     05-22    141  |  108  |  14  ----------------------------<  83  4.8   |  25  |  0.8    Ca    9.3      22 May 2022 06:29  Phos  4.0     05-22  Mg     2.0     05-22    TPro  6.0  /  Alb  3.9  /  TBili  0.4  /  DBili  x   /  AST  11  /  ALT  8   /  AlkPhos  59  05-22        RADIOLOGY:  CT Heart without Coronaries w/ IV Cont (05.21.22 @ 11:40)   IMPRESSION:  1.  Normal coronary arteries  2. Total coronary calcium score is 0.      CT Head No Cont (05.20.22 @ 12:09)   IMPRESSION:  No acute intracranial pathology. No evidence of midline shift, mass effect or intracranial hemorrhage.  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      VA Duplex Lower Ext Vein Scan, Bilat (05.19.22 @ 11:14)   IMPRESSION:  No evidence of deep venous thrombosis in either lower extremity.      CT Abdomen and Pelvis No Cont (05.18.22 @ 17:27)   IMPRESSION:  No definite acute abnormality in the abdomen and pelvis.        PHYSICAL EXAM:  GEN: Pt was seen and examined at bedside. Pleasant  HEENT: normocephalic, atraumatic, aniceteric  LUNGS: Clear to auscultation bilaterally, no rales/wheezing/ rhonchi  HEART: S1/S2 present. RRR, no murmurs  ABD: Soft, non-tender, non-distended. Bowel sounds present  EXT: No LE edema, moves all extremities  NEURO: Alert and awake, follow commands, normal affect      ASSESSMENT AND PLAN:  51 year old female patient with Epilepsy and Raynaud Phenomenon who presented to the ED on 05/18 for evaluation of weakness in setting of recent COVID positive test at home, fever, chills, diarrhea, and cough, found to have sinus bradycardia but otherwise insignificant chest and abdominal imaging, to be admitted for further investigations, management, and monitoring. Currently still in sinus bradycardia around 50bpm.    #Sinus Bradycardia (in Setting of Recent COVID Positive Test + On Diltiazem for Raynaud)  #Weakness/Lightheadedness, resolving  #History of Sinus Bradycardia After First Pfizer Vaccine Dose while being on Diltiazem  -History of Sinus Bradycardia down to HR 30's bpm few months ago after 1st dose of Pfizer Vaccine s/p Admission to [facility] s/p discharge off Diltiazem (but has resumed it herself) and advised against taking 2nd COVID vaccine/ booster  -Home med Diltiazem 120mg for Raynaud  -ED HR went down to 38bpm (asymptomatic; symptoms stable since 2 weeks)  -ECG sinus bradycardia with no block  -Cardiology evaluation: per patient, she has been evaluated at [facility] and etiology was thought to be related to 1st dose of Pfizer vaccine (asked to avoid further shots) s/p discharge of Diltiazem 120mg QD (but patient resumed it at home)  -TSH:  wnl;   lyme negative  -Cardiac CT:  normal coronary arteries  -CTH:  negative for acute pathology  -ECHO:  normal EF    -continue Holding Diltiazem 120mg QD and follow up outpatient with Dr Bullard post discharge  -Avoid Beta blocking agents  -Keep atropine at bedside to be used for symptomatic bradycardia or very low HR  -Monitor tele  -Monitor HR  -Cardiac MRI to r/o infiltrative cardiac process and Exercise Stress test and f/u EP recs    #Weakness in Setting of Diarrhea, Diarrhea resolved  -Onset: 05/09: fever x3 days last week M-W, chills, dry cough, diarrhea since last week (improving), abdominal discomfort (no sore throat or runny nose)  -Recent AB use for UTI last month for 7-10 days (unsure about name of Ab)  -/68 mmHg, HR down to 38 bpm -> currently 50 bpm, T 36.1, SaO2 98% on RA  -CXR no acute process  -CT AP without contrast no intraabdominal process  -S/P 1L NS and Zofran in ED    #Recent COVID Positive Home Test  #COVID PCR 05/18, negative 5/20  #Leukopenia  -Onset: 05/09: fever x3 days last week M-W, chills, dry cough, diarrhea since last week (improving), abdominal discomfort (no sore throat or runny nose)  -Vaccinated against COVID: 1x Pfizer only due to complication as above  -ED HR went down to 38bpm (asymptomatic; symptoms stable since 2 weeks)  -ED Labs WBC 2.91  -CXR no acute process  -LE duplex: negative    -no sxs    #Epilepsy  -Free of seizures for a long time  -Follows with Dr Angel  -On Topiramate 300mg QD    -Outpatient follow up with Dr Angel  -c/w Topiramate 300mg QD    Raynaud Syndrome  -Follows with Dr Bullard  -On Diltiazem 120mg     -Hold Diltiazem 120mg QD and follow up outpatient with Dr Bullard post discharge    Others  - DVT Prophylaxis: Lovenox 40mg Subcutaneously daily  - GI Prophylaxis: Pantoprazole 40mg PO QD  - Diet: Gluten free diet  - Code Status: Full      Provider Handoff: (Pending) - follow up:  -Cardiac MRI to r/o infiltrative cardiac process and Exercise Stress test and f/u EP recs SUBJECTIVE:    Patient is a 51y old Female who presents with a chief complaint of Weakness (22 May 2022 18:01)    Currently admitted to medicine with the primary diagnosis of COVID       Today is hospital day 5d. This morning she is resting comfortably in bed and reports no new issues or overnight events.       PAST MEDICAL & SURGICAL HISTORY  H/O Raynaud&#x27;s syndrome    Epilepsy    No significant past surgical history      SOCIAL HISTORY:    ALLERGIES:  Originally Entered as [DYSPNEA] reaction to [sari] (Unknown)  penicillin (Other)    MEDICATIONS:  STANDING MEDICATIONS  chlorhexidine 4% Liquid 1 Application(s) Topical <User Schedule>  enoxaparin Injectable 40 milliGRAM(s) SubCutaneous every 24 hours  lactated ringers. 1000 milliLiter(s) IV Continuous <Continuous>  ondansetron Injectable 4 milliGRAM(s) IV Push once  pantoprazole   Suspension 40 milliGRAM(s) Oral two times a day  polyethylene glycol 3350 17 Gram(s) Oral daily  senna 2 Tablet(s) Oral at bedtime  topiramate 300 milliGRAM(s) Oral daily    PRN MEDICATIONS  acetaminophen     Tablet .. 650 milliGRAM(s) Oral every 6 hours PRN  aluminum hydroxide/magnesium hydroxide/simethicone Suspension 30 milliLiter(s) Oral every 6 hours PRN  ibuprofen  Tablet. 600 milliGRAM(s) Oral every 6 hours PRN  ondansetron Injectable 4 milliGRAM(s) IV Push every 8 hours PRN  sucralfate 1 Gram(s) Oral four times a day PRN    VITALS:   T(F): 96.6  HR: 54  BP: 111/67  RR: 18  SpO2: --    LABS:  Negative for smoking/alcohol/drug use.                         12.3   3.95  )-----------( 285      ( 22 May 2022 06:29 )             35.5     05-22    141  |  108  |  14  ----------------------------<  83  4.8   |  25  |  0.8    Ca    9.3      22 May 2022 06:29  Phos  4.0     05-22  Mg     2.0     05-22    TPro  6.0  /  Alb  3.9  /  TBili  0.4  /  DBili  x   /  AST  11  /  ALT  8   /  AlkPhos  59  05-22        RADIOLOGY:  CT Heart without Coronaries w/ IV Cont (05.21.22 @ 11:40)   IMPRESSION:  1.  Normal coronary arteries  2. Total coronary calcium score is 0.      CT Head No Cont (05.20.22 @ 12:09)   IMPRESSION:  No acute intracranial pathology. No evidence of midline shift, mass effect or intracranial hemorrhage.  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      VA Duplex Lower Ext Vein Scan, Bilat (05.19.22 @ 11:14)   IMPRESSION:  No evidence of deep venous thrombosis in either lower extremity.      CT Abdomen and Pelvis No Cont (05.18.22 @ 17:27)   IMPRESSION:  No definite acute abnormality in the abdomen and pelvis.        PHYSICAL EXAM:  GEN: Pt was seen and examined at bedside. Pleasant  HEENT: normocephalic, atraumatic, aniceteric  LUNGS: Clear to auscultation bilaterally, no rales/wheezing/ rhonchi  HEART: S1/S2 present. RRR, no murmurs  ABD: Soft, non-tender, non-distended. Bowel sounds present  EXT: No LE edema, moves all extremities  NEURO: Alert and awake, follow commands, normal affect      ASSESSMENT AND PLAN:  51 year old female patient with Epilepsy and Raynaud Phenomenon who presented to the ED on 05/18 for evaluation of weakness in setting of recent COVID positive test at home, fever, chills, diarrhea, and cough, found to have sinus bradycardia but otherwise insignificant chest and abdominal imaging, to be admitted for further investigations, management, and monitoring. Currently still in sinus bradycardia around 50bpm.    #Sinus Bradycardia (in Setting of Recent COVID Positive Test + On Diltiazem for Raynaud)  #Weakness/Lightheadedness, resolving  #History of Sinus Bradycardia After First Pfizer Vaccine Dose while being on Diltiazem  -History of Sinus Bradycardia down to HR 30's bpm few months ago after 1st dose of Pfizer Vaccine s/p Admission to [facility] s/p discharge off Diltiazem (but has resumed it herself) and advised against taking 2nd COVID vaccine/ booster  -Home med Diltiazem 120mg for Raynaud  -ED HR went down to 38bpm (asymptomatic; symptoms stable since 2 weeks)  -ECG sinus bradycardia with no block  -Cardiology evaluation: per patient, she has been evaluated at [facility] and etiology was thought to be related to 1st dose of Pfizer vaccine (asked to avoid further shots) s/p discharge of Diltiazem 120mg QD (but patient resumed it at home)  -TSH:  wnl;   lyme negative  -Cardiac CT:  normal coronary arteries  -CTH:  negative for acute pathology  -ECHO:  normal EF    -continue Holding Diltiazem 120mg QD and follow up outpatient with Dr Bullard post discharge  -Avoid Beta blocking agents  -Keep atropine at bedside to be used for symptomatic bradycardia or very low HR  -Monitor tele  -Monitor HR  -Cardiac MRI to r/o infiltrative cardiac process and Exercise Stress test and f/u EP recs    #Weakness in Setting of Diarrhea, Diarrhea resolved  -Onset: 05/09: fever x3 days last week M-W, chills, dry cough, diarrhea since last week (improving), abdominal discomfort (no sore throat or runny nose)  -Recent AB use for UTI last month for 7-10 days (unsure about name of Ab)  -/68 mmHg, HR down to 38 bpm -> currently 50 bpm, T 36.1, SaO2 98% on RA  -CXR no acute process  -CT AP without contrast no intraabdominal process  -S/P 1L NS and Zofran in ED    #Recent COVID Positive Home Test  #COVID PCR 05/18, negative 5/20  #Leukopenia  -Onset: 05/09: fever x3 days last week M-W, chills, dry cough, diarrhea since last week (improving), abdominal discomfort (no sore throat or runny nose)  -Vaccinated against COVID: 1x Pfizer only due to complication as above  -ED HR went down to 38bpm (asymptomatic; symptoms stable since 2 weeks)  -ED Labs WBC 2.91  -CXR no acute process  -LE duplex: negative    -no sxs    #Epilepsy  -Free of seizures for a long time  -Follows with Dr Angel  -On Topiramate 300mg QD    -Outpatient follow up with Dr Angel  -c/w Topiramate 300mg QD    Raynaud Syndrome  -Follows with Dr Bullard  -On Diltiazem 120mg     -Hold Diltiazem 120mg QD and follow up outpatient with Dr Bullard post discharge    Others  - DVT Prophylaxis: Lovenox 40mg Subcutaneously daily  - GI Prophylaxis: Pantoprazole 40mg PO QD  - Diet: Gluten free diet  - Code Status: Full      Provider Handoff: (Pending) - follow up:  -Cardiac MRI to r/o infiltrative cardiac process  -midline placed today  -f/u exercise stress test  -f/u EP recs post MRI/stress test

## 2022-05-23 NOTE — OCCUPATIONAL THERAPY INITIAL EVALUATION ADULT - RANGE OF MOTION EXAMINATION, UPPER EXTREMITY
c/o pain 10/10 VAS when performing shoulder flexion L UE > 90*./Left UE Active ROM was WFL (within functional limits)/Right UE Active ROM was WNL (within normal limits) c/o pain 10/10 VAS when performing shoulder flexion L UE > 90*.  Pain managed by rest. As per pt, full relief./Left UE Active ROM was WFL (within functional limits)/Right UE Active ROM was WNL (within normal limits)

## 2022-05-23 NOTE — DIETITIAN INITIAL EVALUATION ADULT - PERTINENT LABORATORY DATA
05-22    141  |  108  |  14  ----------------------------<  83  4.8   |  25  |  0.8    Ca    9.3      22 May 2022 06:29  Phos  4.0     05-22  Mg     2.0     05-22    TPro  6.0  /  Alb  3.9  /  TBili  0.4  /  DBili  x   /  AST  11  /  ALT  8   /  AlkPhos  59  05-22  A1C with Estimated Average Glucose Result: 5.2 % (05-19-22 @ 04:30)

## 2022-05-23 NOTE — DIETITIAN INITIAL EVALUATION ADULT - ORAL INTAKE PTA/DIET HISTORY
Pt had a good appetite at home; consumed 3 meals + snacks daily. Reports drinking 1 protein shake and takes a MVI daily. Pt follows a gluten-free + pescatarian diet; does not like pasta or bread. Pt is allergic to mangos; experiences throat closing when consumed. Reports enjoying big salads. Pt had a good appetite at home; consumed 3 meals + snacks daily. Reports drinking 1 protein shake and takes a MVI daily. Pt follows a gluten-free + pescatarian diet; does not like pasta or bread. Pt is allergic to mangos; experiences throat closing when consumed. Reports enjoying big salads daily and watching her wt. When her low BMI/wt was brought up, pt states "my medication is the reason my wt is so low."

## 2022-05-23 NOTE — OCCUPATIONAL THERAPY INITIAL EVALUATION ADULT - ADDITIONAL COMMENTS
As per pt report, resides in private house with mandatory steps to enter; full flight to bedroom/full bath. 1/2 bath on first floor. Owns 1 wall grab bar in tub; does not use. Still drives.

## 2022-05-23 NOTE — PROGRESS NOTE ADULT - ATTENDING COMMENTS
Patient seen and examined independently and care D/W the intern. PGY1 progress note reviewed. Agree with the findings and plan of care.    Pt is sitting in a chair. Denies any palpitation. C/O on and off dizziness. No cp. No sob. No events on tele. CTA coronaries showed normal coronaries. No events on tele except sinus bradycardia. Waiting for cardiac MRI recommended by EP

## 2022-05-23 NOTE — DIETITIAN INITIAL EVALUATION ADULT - ADD RECOMMEND
1. Continue on current diet order.  2. Encourage PO intake.  3. Pt is at high risk; will f/u in 4 days (mild pcm, mon po intake).

## 2022-05-23 NOTE — DIETITIAN INITIAL EVALUATION ADULT - OTHER CALCULATIONS
Using ABW 46 KG: ENERGY: [id number] kcal/day (MSJ 1.2-1.5 AF); PROTEIN: 55-69 g/day (1.2-1.5 g/kg); FLUID: 5471-1489 mL/day (25-30 mL/kg) -- with consideration for mild PCM, age

## 2022-05-23 NOTE — DIETITIAN INITIAL EVALUATION ADULT - NAME AND PHONE
Nga [phone]    Nutrition Intervention: meals and snacks; Nutrition Monitoring: Diet order, PO intake, weights, labs, NFPF, body composition, and BM. Pt declined supplements offered; will monitor PO intake closely and check if pt is agreeable to accepting supplements at follow up.

## 2022-05-23 NOTE — OCCUPATIONAL THERAPY INITIAL EVALUATION ADULT - PERTINENT HX OF CURRENT PROBLEM, REHAB EVAL
51 year old female h/o epilepsy. Seizure-free for a long time. Raynaud Syndrome. She presented to the ED on 05/18 for evaluation of weakness in setting of recent COVID positive test on 05/09. Since then, she reports progressive weakness and reduced PO intake in the absence of vomiting.

## 2022-05-23 NOTE — DIETITIAN INITIAL EVALUATION ADULT - PERSON TAUGHT/METHOD
Discussed importance of PO intake, muscle mass and body fat loss prevention, and medical food supplements./verbal instruction/patient instructed

## 2022-05-23 NOTE — DIETITIAN INITIAL EVALUATION ADULT - NUTRITIONGOAL OUTCOME1
Pt to meet >75% of estimated energy needs within 4-6 days.  Pt to meet >75% of estimated energy needs within 4 days.

## 2022-05-23 NOTE — DIETITIAN INITIAL EVALUATION ADULT - OTHER INFO
Pt has a good appetite; consumes >75% of meals provided. Denies nausea or vomiting.     Weight hx: UBW is 45.45 KG (100#). Current dosing weight 46 KG (101.2#); 1.18% unintentional wt loss in 1 month. No wt loss reported.  Pt has a good appetite; consumes >75% of meals provided. Denies nausea or vomiting. RD offered supplements as pt wt is low; pt declined.     Weight hx: UBW is 45.45 KG (100#). Current dosing weight 46 KG (101.2#); 1.18% unintentional wt loss in 1 month. No wt loss reported.

## 2022-05-24 ENCOUNTER — TRANSCRIPTION ENCOUNTER (OUTPATIENT)
Age: 52
End: 2022-05-24

## 2022-05-24 VITALS
TEMPERATURE: 96 F | HEART RATE: 39 BPM | DIASTOLIC BLOOD PRESSURE: 64 MMHG | WEIGHT: 99.65 LBS | RESPIRATION RATE: 18 BRPM | SYSTOLIC BLOOD PRESSURE: 107 MMHG

## 2022-05-24 LAB
ALBUMIN SERPL ELPH-MCNC: 3.9 G/DL — SIGNIFICANT CHANGE UP (ref 3.5–5.2)
ALP SERPL-CCNC: 55 U/L — SIGNIFICANT CHANGE UP (ref 30–115)
ALT FLD-CCNC: 10 U/L — SIGNIFICANT CHANGE UP (ref 0–41)
ANION GAP SERPL CALC-SCNC: 12 MMOL/L — SIGNIFICANT CHANGE UP (ref 7–14)
AST SERPL-CCNC: 15 U/L — SIGNIFICANT CHANGE UP (ref 0–41)
BASOPHILS # BLD AUTO: 0.04 K/UL — SIGNIFICANT CHANGE UP (ref 0–0.2)
BASOPHILS NFR BLD AUTO: 1.4 % — HIGH (ref 0–1)
BILIRUB SERPL-MCNC: 0.3 MG/DL — SIGNIFICANT CHANGE UP (ref 0.2–1.2)
BUN SERPL-MCNC: 13 MG/DL — SIGNIFICANT CHANGE UP (ref 10–20)
CALCIUM SERPL-MCNC: 9.3 MG/DL — SIGNIFICANT CHANGE UP (ref 8.5–10.1)
CHLORIDE SERPL-SCNC: 111 MMOL/L — HIGH (ref 98–110)
CO2 SERPL-SCNC: 20 MMOL/L — SIGNIFICANT CHANGE UP (ref 17–32)
CREAT SERPL-MCNC: 0.7 MG/DL — SIGNIFICANT CHANGE UP (ref 0.7–1.5)
EGFR: 103 ML/MIN/1.73M2 — SIGNIFICANT CHANGE UP
EOSINOPHIL # BLD AUTO: 0.07 K/UL — SIGNIFICANT CHANGE UP (ref 0–0.7)
EOSINOPHIL NFR BLD AUTO: 2.4 % — SIGNIFICANT CHANGE UP (ref 0–8)
FIBRINOGEN AG PPP IA-MCNC: 333 MG/DL — SIGNIFICANT CHANGE UP (ref 180–350)
GLUCOSE SERPL-MCNC: 76 MG/DL — SIGNIFICANT CHANGE UP (ref 70–99)
HCT VFR BLD CALC: 34.2 % — LOW (ref 37–47)
HGB BLD-MCNC: 11.9 G/DL — LOW (ref 12–16)
IMM GRANULOCYTES NFR BLD AUTO: 0 % — LOW (ref 0.1–0.3)
LYMPHOCYTES # BLD AUTO: 1.52 K/UL — SIGNIFICANT CHANGE UP (ref 1.2–3.4)
LYMPHOCYTES # BLD AUTO: 51.9 % — HIGH (ref 20.5–51.1)
MAGNESIUM SERPL-MCNC: 2.1 MG/DL — SIGNIFICANT CHANGE UP (ref 1.8–2.4)
MCHC RBC-ENTMCNC: 34.1 PG — HIGH (ref 27–31)
MCHC RBC-ENTMCNC: 34.8 G/DL — SIGNIFICANT CHANGE UP (ref 32–37)
MCV RBC AUTO: 98 FL — SIGNIFICANT CHANGE UP (ref 81–99)
MONOCYTES # BLD AUTO: 0.32 K/UL — SIGNIFICANT CHANGE UP (ref 0.1–0.6)
MONOCYTES NFR BLD AUTO: 10.9 % — HIGH (ref 1.7–9.3)
NEUTROPHILS # BLD AUTO: 0.98 K/UL — LOW (ref 1.4–6.5)
NEUTROPHILS NFR BLD AUTO: 33.4 % — LOW (ref 42.2–75.2)
NRBC # BLD: 0 /100 WBCS — SIGNIFICANT CHANGE UP (ref 0–0)
PLATELET # BLD AUTO: 267 K/UL — SIGNIFICANT CHANGE UP (ref 130–400)
POTASSIUM SERPL-MCNC: 4 MMOL/L — SIGNIFICANT CHANGE UP (ref 3.5–5)
POTASSIUM SERPL-SCNC: 4 MMOL/L — SIGNIFICANT CHANGE UP (ref 3.5–5)
PROT SERPL-MCNC: 5.8 G/DL — LOW (ref 6–8)
RBC # BLD: 3.49 M/UL — LOW (ref 4.2–5.4)
RBC # FLD: 10.7 % — LOW (ref 11.5–14.5)
SODIUM SERPL-SCNC: 143 MMOL/L — SIGNIFICANT CHANGE UP (ref 135–146)
WBC # BLD: 2.93 K/UL — LOW (ref 4.8–10.8)
WBC # FLD AUTO: 2.93 K/UL — LOW (ref 4.8–10.8)

## 2022-05-24 PROCEDURE — 99239 HOSP IP/OBS DSCHRG MGMT >30: CPT

## 2022-05-24 PROCEDURE — 75561 CARDIAC MRI FOR MORPH W/DYE: CPT | Mod: 26

## 2022-05-24 PROCEDURE — 99233 SBSQ HOSP IP/OBS HIGH 50: CPT

## 2022-05-24 RX ORDER — DILTIAZEM HCL 120 MG
1 CAPSULE, EXT RELEASE 24 HR ORAL
Qty: 0 | Refills: 0 | DISCHARGE

## 2022-05-24 RX ADMIN — CHLORHEXIDINE GLUCONATE 1 APPLICATION(S): 213 SOLUTION TOPICAL at 05:17

## 2022-05-24 RX ADMIN — Medication 300 MILLIGRAM(S): at 12:45

## 2022-05-24 RX ADMIN — PANTOPRAZOLE SODIUM 40 MILLIGRAM(S): 20 TABLET, DELAYED RELEASE ORAL at 05:17

## 2022-05-24 NOTE — PROGRESS NOTE ADULT - SUBJECTIVE AND OBJECTIVE BOX
LEXIICHANDRIKA  51y Female    CHIEF COMPLAINT:    Patient is a 51y old  Female who presents with a chief complaint of Weakness (24 May 2022 06:28)      INTERVAL HPI/OVERNIGHT EVENTS:    Patient seen and examined. Feels good. No dizziness. No palpitations. No sob.     ROS: All other systems are negative.    Vital Signs:    T(F): 96.5 (22 @ 04:25), Max: 96.5 (22 @ 04:25)  HR: 39 (22 @ 04:25) (39 - 45)  BP: 107/64 (22 @ 04:25) (107/64 - 127/72)  RR: 18 (22 @ 04:25) (18 - 18)  SpO2: --  I&O's Summary    23 May 2022 07:01  -  24 May 2022 07:00  --------------------------------------------------------  IN: 390 mL / OUT: 0 mL / NET: 390 mL    24 May 2022 07:01  -  24 May 2022 15:07  --------------------------------------------------------  IN: 266 mL / OUT: 0 mL / NET: 266 mL      Daily Height in cm: 165.1 (23 May 2022 15:56)    Daily Weight in k.2 (24 May 2022 04:25)  CAPILLARY BLOOD GLUCOSE          PHYSICAL EXAM:    GENERAL:  NAD  SKIN: No rashes or lesions  HENT: Atraumatic. Normocephalic. PERRL. Moist membranes.  NECK: Supple, No JVD. No lymphadenopathy.  PULMONARY: CTA B/L. No wheezing. No rales  CVS: Normal S1, S2. Rate and Rhythm are regular. No murmurs.  ABDOMEN/GI: Soft, Nontender, Nondistended; BS present  EXTREMITIES: Peripheral pulses intact. No edema B/L LE.  NEUROLOGIC:  No motor or sensory deficit.  PSYCH: Alert & oriented x 3    Consultant(s) Notes Reviewed:  [x ] YES  [ ] NO  Care Discussed with Consultants/Other Providers [ x] YES  [ ] NO    EKG reviewed  Telemetry reviewed    LABS:                        11.9   2.93  )-----------( 267      ( 24 May 2022 07:24 )             34.2         143  |  111<H>  |  13  ----------------------------<  76  4.0   |  20  |  0.7    Ca    9.3      24 May 2022 07:24  Mg     2.1         TPro  5.8<L>  /  Alb  3.9  /  TBili  0.3  /  DBili  x   /  AST  15  /  ALT  10  /  AlkPhos  55                RADIOLOGY & ADDITIONAL TESTS:    < from: CT Heart without Coronaries w/ IV Cont (22 @ 11:40) >    IMPRESSION:    1.  Normal coronary arteries    2. Total coronary calcium score is 0.      < end of copied text >  < from: TTE Echo Complete w/ Contrast w/ Doppler (22 @ 14:29) >    Summary:   1. Left ventricular ejection fraction, by visual estimation, is 55 to   60%.   2. Normal global left ventricular systolic function.   3. Normal left atrial size.   4. Normal right atrial size.   5. Mild mitral valve regurgitation.   6. Mild tricuspid regurgitation.    < end of copied text >    Imaging or report Personally Reviewed:  [ ] YES  [ ] NO    Medications:  Standing  ALPRAZolam 2 milliGRAM(s) Oral at bedtime  chlorhexidine 4% Liquid 1 Application(s) Topical <User Schedule>  enoxaparin Injectable 40 milliGRAM(s) SubCutaneous every 24 hours  lactated ringers. 1000 milliLiter(s) IV Continuous <Continuous>  ondansetron Injectable 4 milliGRAM(s) IV Push once  pantoprazole   Suspension 40 milliGRAM(s) Oral two times a day  polyethylene glycol 3350 17 Gram(s) Oral daily  senna 2 Tablet(s) Oral at bedtime  topiramate 300 milliGRAM(s) Oral daily    PRN Meds  acetaminophen     Tablet .. 650 milliGRAM(s) Oral every 6 hours PRN  aluminum hydroxide/magnesium hydroxide/simethicone Suspension 30 milliLiter(s) Oral every 6 hours PRN  ibuprofen  Tablet. 600 milliGRAM(s) Oral every 6 hours PRN  ondansetron Injectable 4 milliGRAM(s) IV Push every 8 hours PRN  sucralfate 1 Gram(s) Oral four times a day PRN      Case discussed with resident    Care discussed with pt/family

## 2022-05-24 NOTE — DISCHARGE NOTE PROVIDER - NSDCCPCAREPLAN_GEN_ALL_CORE_FT
PRINCIPAL DISCHARGE DIAGNOSIS  Diagnosis: Bradycardia  Assessment and Plan of Treatment: You presented to the ED with symptoms of dizziness/lightheadedness/weakness. You were known to have a recent covid infection. You were found to have bradycardia (slow heart rate). During you stay electrophysiology (EP) was consulted and per primary team and EP your diltiazem was discontinued. Your thyroid levels were normal and are unlikely to be related to your slow heart rate. You had an exercise stress test showing appropriate heart rate response during exercise, and it did not show any evidence EKG changes with exercise. You also had a cardiac cat scan showing normal coronoary arteries. Your echocardiogram show normal heart functioning.   Please do not take your home diltiazem and follow up with your PCP Dr. Neely and with the electrophysiology doctor (contact provided) to review your cardiac MRI results and for further treatment and management of your slow heart rate.      SECONDARY DISCHARGE DIAGNOSES  Diagnosis: Epilepsy  Assessment and Plan of Treatment: Please follow up with your PCP and neurologist for continued management of your epilepsy. Please continue to take your home medication. You had a cat scan of your head and show no acute pathology.    Diagnosis: H/O Raynaud's syndrome  Assessment and Plan of Treatment: Please discontinue your diltiazem at this time and follow up with your primary doctor.

## 2022-05-24 NOTE — DISCHARGE NOTE PROVIDER - NSDCFUSCHEDAPPT_GEN_ALL_CORE_FT
[facility]  Cardio [address]  Scheduled Appointment: 06/09/2022    Misa Alvarado  [facility]  Cardio [address]  Scheduled Appointment: 07/21/2022

## 2022-05-24 NOTE — DISCHARGE NOTE PROVIDER - CARE PROVIDER_API CALL
Parker Neely)  Family Medicine  [address] [place] [address]  Phone: [phone]  Fax: [phone]  Established Patient  Follow Up Time: 1 week    Aleksey Garces; MARIAN)  [place] [address]  Phone: [phone]  Fax: [phone]  Established Patient  Follow Up Time: 1 week    Oliver Angel)  Neurology; Neurophysiology  [address] [place] [address]  Phone: [phone]  Fax: [phone]  Established Patient  Follow Up Time: 1 week   Parker Neely)  Family Medicine  [address] [place] [address]  Phone: [phone]  Fax: [phone]  Established Patient  Follow Up Time: 1 week    Oliver Angel)  Neurology; Neurophysiology  [address] [place] [address]  Phone: [phone]  Fax: [phone]  Established Patient  Follow Up Time: 1 week    Tran Tenorio)  Cardiovascular Disease; Internal Medicine  [address] [facility], [address]  [place], [place] [id number]  Phone: [phone]  Fax: [phone]  Follow Up Time: 2 weeks

## 2022-05-24 NOTE — DISCHARGE NOTE NURSING/CASE MANAGEMENT/SOCIAL WORK - PATIENT PORTAL LINK FT
You can access the FollowMyHealth Patient Portal offered by [facility] by registering at the following website: [email or web address]. By joining [facility]’s FollowMyHealth portal, you will also be able to view your health information using other applications (apps) compatible with our system.

## 2022-05-24 NOTE — DISCHARGE NOTE PROVIDER - CARE PROVIDERS DIRECT ADDRESSES
[email or web address]

## 2022-05-24 NOTE — PROGRESS NOTE ADULT - PROVIDER SPECIALTY LIST ADULT
Electrophysiology
Hospitalist
Internal Medicine
Hospitalist
Electrophysiology
Electrophysiology
Hospitalist
Internal Medicine
Internal Medicine

## 2022-05-24 NOTE — PROGRESS NOTE ADULT - NUTRITIONAL ASSESSMENT
This patient has been assessed with a concern for Malnutrition and has been determined to have a diagnosis/diagnoses of Mild protein-calorie malnutrition and Underweight (BMI < 19).    This patient is being managed with:   Diet Regular-  Gluten-Gliadin Restricted  Entered: May 23 2022 11:20AM    
This patient has been assessed with a concern for Malnutrition and has been determined to have a diagnosis/diagnoses of Mild protein-calorie malnutrition and Underweight (BMI < 19).    This patient is being managed with:   Diet Regular-  Gluten-Gliadin Restricted  Entered: May 23 2022 11:20AM

## 2022-05-24 NOTE — DISCHARGE NOTE PROVIDER - NPI NUMBER (FOR SYSADMIN USE ONLY) :
CC: PT PRESENTING FOR A COMPLETE EYE EXAM    Occupation:        PAST PERSONAL OCCULAR HX: -MICRO ANEURYSM OS                                                                  DOT HEME OD         Ck ALLERGY , FLOATERS , CATARACTS , GL SUSPECT, OC HTN   FHX DAD ARM  MED HX HTN    Assessment:  Presbyopia     Plan: NO REFRACTION,   OTC READERS PRN  +1.00 FOR DISTANCE  +2.50  COMPUTER  +3.50 DETAILS        Assessment:  ALLERGIC CONJUNCTIVITIS both; fall and spring    Plan:  [place]away 1 gtt BID as needed, over the counter        Assessment:  CATARACT both    Plan:  Discussed with patient natural course and treatment of Cataracts    BVA: Right Eye: 20/20, Left Eye: 20/20        Assessment:  GLAUCOMA SUSPECT: Based on IOP, MAXIMUM PRESSURE, 26/28    Plan:  Discussed nature of disease and risk factors, 24-2 visual field and IOP check, OCT and Pachymetry    Date: 10/23/2019  PACHYMETRY CORRECTION FACTOR:-1/-2  TIOP (TRUE INTRAOCULAR PRESSURE: 25/27  OCT (OPTICAL COHERENCE TOMOGRAPHY) RESULTS:CIRRUS HD  THE AVERAGE RNFL(RETINAL NERVE FIBER LAYER)THICKNESS:97/99 (NORMAL GREATER THAN 80)  SERIAL ANALYSIS:        NA (NORMAL CHANGE LESS THAN 20)  TSNIT (TEMPORAL-SUPERIOR-NASAL-INFERIOR-TEMPORAL: WNL  CUP TO DISC AREA RATIO: 0.41/0.33 (ABNORMAL GREATER THAN 0.64)    VISUAL FIELD:POOR RELIABILITY  PSD (PATTERN STANDARD DEVIATION): 1.83/1.67    Date: 1/5/2021     PACHYMETRY CORRECTION FACTOR:-1/-2  TIOP (TRUE INTRAOCULAR PRESSURE: 20/23  OCT (OPTICAL COHERENCE TOMOGRAPHY) RESULTS:CIRRUS HD  THE AVERAGE RNFL(RETINAL NERVE FIBER LAYER)THICKNESS:96/95 (NORMAL GREATER THAN 80)  SERIAL ANALYSIS:        WNL  (NORMAL CHANGE LESS THAN 20)  TSNIT (TEMPORAL-SUPERIOR-NASAL-INFERIOR-TEMPORAL: WNL   CUP TO DISC AREA RATIO: 0.40/0.33 (ABNORMAL GREATER THAN 0.64)    VISUAL FIELD:CENTRAL DEPRESSION OU, COOP?  PSD (PATTERN STANDARD DEVIATION): 2.19/2.18  ? 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  Discussed symptoms of retinal detachment.  Patient will call immediately if symptoms of retinal detachment develops    HX HTN RETINA    DIGITAL RETINAL PHOTO 10/21/2020, EDUCATIONAL     [[id number]],[[id number]] [[id number]]

## 2022-05-24 NOTE — PROGRESS NOTE ADULT - SUBJECTIVE AND OBJECTIVE BOX
INTERVAL HPI/OVERNIGHT EVENTS:  No acute events over night. S/p CMRI today, results pending. Remain leidy, asymptomatic.   Patient denies fever, chills, dizziness, syncope, chest pain, palpitations, SOB, cough, abd pain, n/v/d/c, dysuria, hematuria or unusual rash.     MEDICATIONS  (STANDING):  ALPRAZolam 2 milliGRAM(s) Oral at bedtime  chlorhexidine 4% Liquid 1 Application(s) Topical <User Schedule>  enoxaparin Injectable 40 milliGRAM(s) SubCutaneous every 24 hours  lactated ringers. 1000 milliLiter(s) (100 mL/Hr) IV Continuous <Continuous>  ondansetron Injectable 4 milliGRAM(s) IV Push once  pantoprazole   Suspension 40 milliGRAM(s) Oral two times a day  polyethylene glycol 3350 17 Gram(s) Oral daily  senna 2 Tablet(s) Oral at bedtime  topiramate 300 milliGRAM(s) Oral daily    MEDICATIONS  (PRN):  acetaminophen     Tablet .. 650 milliGRAM(s) Oral every 6 hours PRN Mild Pain (1 - 3)  aluminum hydroxide/magnesium hydroxide/simethicone Suspension 30 milliLiter(s) Oral every 6 hours PRN Dyspepsia  ibuprofen  Tablet. 600 milliGRAM(s) Oral every 6 hours PRN Temp greater or equal to 38C (100.4F), Mild Pain (1 - 3)  ondansetron Injectable 4 milliGRAM(s) IV Push every 8 hours PRN Nausea and/or Vomiting  sucralfate 1 Gram(s) Oral four times a day PRN dyspepsia      Allergies    Originally Entered as [DYSPNEA] reaction to [sari] (Unknown)  penicillin (Other)    Intolerances        REVIEW OF SYSTEMS    [x] A ten-point review of systems was otherwise negative except as noted.  [ ] Due to altered mental status/intubation, subjective information were not able to be obtained from the patient. History was obtained, to the extent possible, from review of the chart and collateral sources of information.      Vital Signs Last 24 Hrs  T(C): 35.8 (24 May 2022 04:25), Max: 35.8 (23 May 2022 20:23)  T(F): 96.5 (24 May 2022 04:25), Max: 96.5 (24 May 2022 04:25)  HR: 39 (24 May 2022 04:25) (39 - 45)  BP: 107/64 (24 May 2022 04:25) (107/64 - 127/72)  BP(mean): --  RR: 18 (24 May 2022 04:25) (18 - 18)  SpO2: --    05-23-22 @ 07:01  -  05-24-22 @ 07:00  --------------------------------------------------------  IN: 390 mL / OUT: 0 mL / NET: 390 mL    05-24-22 @ 07:01  -  05-24-22 @ 18:25  --------------------------------------------------------  IN: 266 mL / OUT: 0 mL / NET: 266 mL        Physical Exam  GENERAL: In no apparent distress, well nourished, and hydrated.  HEART: Bradycardia, Regular rhythm; No murmurs, rubs, or gallops.  PULMONARY: Clear to auscultation and perfusion.  No rales, wheezing, or rhonchi bilaterally.  ABDOMEN: Soft, Nontender, Nondistended; Bowel sounds present  EXTREMITIES:  2+ Peripheral Pulses, No clubbing, cyanosis, or edema  NEUROLOGICAL: AO x4 ,NGUYEN, speech clear    LABS:                        11.9   2.93  )-----------( 267      ( 24 May 2022 07:24 )             34.2     05-24    143  |  111<H>  |  13  ----------------------------<  76  4.0   |  20  |  0.7    Ca    9.3      24 May 2022 07:24  Mg     2.1     05-24    TPro  5.8<L>  /  Alb  3.9  /  TBili  0.3  /  DBili  x   /  AST  15  /  ALT  10  /  AlkPhos  55  05-24 05-23-22 @ 07:01  -  05-24-22 @ 07:00  --------------------------------------------------------  IN: 390 mL / OUT: 0 mL / NET: 390 mL    05-24-22 @ 07:01  -  05-24-22 @ 18:26  --------------------------------------------------------  IN: 266 mL / OUT: 0 mL / NET: 266 mL        05-23-22 @ 07:01  -  05-24-22 @ 07:00  --------------------------------------------------------  IN: 390 mL / OUT: 0 mL / NET: 390 mL    05-24-22 @ 07:01  -  05-24-22 @ 18:26  --------------------------------------------------------  IN: 266 mL / OUT: 0 mL / NET: 266 mL        RADIOLOGY:  < from: Cardiac Stress Test (Non Imaging) (05.23.22 @ 18:26) >   Summary      Time In Exercise Phase_^00:09:17^_      Max. Systolic BP_^129^_mmHg      Max Diastolic BP_^81^_mmHg      Max Heart Rate_^169^_BPM      Max Predicted Heart Rate_^167^_BPM      Target HR Formula_^(220 - Age)*100%^_      Reason for Test_^Dizzy Spells^_      Arrhythmias_^none^_      Resting ECG_^marked sinus leidy^_      ST Changes_^Depression upsloping^_      Overall Impression_^Equivocal stress test^_      Chest Pain_^none^_      HR Response To Exercise_^appropriate^_      BP Response To Exercise_^normal resting BP - appropriate response^_      Reason For Termination_^Leg discomfort^_      Functional Capacity_^Normal^_     Medication Name&Dosage&Admin Method&Category pantoprazole, topiramate     Diagnois Line Equivocal stress test.  No definitive ECG evidence of ischemia.  Significant     Diagnois Line baseline artifact throughout exercise limits  interpretation.  No symptoms     Diagnois Line during exercise. Test terminated due to LEG PAIN,  alot of interference d/t      Diagnois Line small body habitus  and lack of support for ekg module,  Positive for     Diagnois Line chronotropic competence.     < end of copied text >      < from: CT Heart without Coronaries w/ IV Cont (05.21.22 @ 11:40) >  IMPRESSION:    1.  Normal coronary arteries    2. Total coronary calcium score is 0.    < end of copied text >    < from: TTE Echo Complete w/ Contrast w/ Doppler (05.21.22 @ 14:29) >  Summary:   1. Left ventricular ejection fraction, by visual estimation, is 55 to   60%.   2. Normal global left ventricular systolic function.   3. Normal left atrial size.   4. Normal right atrial size.   5. Mild mitral valve regurgitation.   6. Mild tricuspid regurgitation.    PHYSICIAN INTERPRETATION:  Left Ventricle: Normal left ventricular size and wall thicknesses, with   normal systolic and diastolic function. Global LV systolic function was   normal. Left ventricular ejection fraction,by visual estimation, is 55   to 60%.  Right Ventricle: Normal right ventricular size and function.  Left Atrium: Normal left atrial size.  Right Atrium: Normal right atrial size.  Pericardium: There is no evidence of pericardial effusion.  Mitral Valve: Structurally normal mitral valve, with normal leaflet   excursion. Mild mitral valve regurgitation is seen. The MR jet is   posteriorly-directed.  Tricuspid Valve: Structurally normal tricuspid valve, with normal leaflet   excursion. Mild tricuspid regurgitation is visualized.  Aortic Valve: Normal trileaflet aortic valve with normal opening. No   evidence of aortic stenosis. No evidence of aortic valve regurgitation is   seen.    < end of copied text >

## 2022-05-24 NOTE — DISCHARGE NOTE PROVIDER - NSDCFUADDAPPT_GEN_ALL_CORE_FT
Please follow up with your primary care doctor and the electrophysiology doctor, Dr. Garces, to follow up with your cardiac MRI results and for further treatment and management.   Please discontinue your home diltiazem for your Raynoud's Snydrome at this time and follow up with your doctors. Please follow up with your primary care doctor and the electrophysiology doctor, Dr. Tenorio, in 2 weeks (the electrophysiology office will call you to schedule your appointment. to follow up with your cardiac MRI results and for further treatment and management.   Please discontinue your home diltiazem for your Raynaud's Syndrome at this time and follow up with your doctors.

## 2022-05-24 NOTE — PROGRESS NOTE ADULT - SUBJECTIVE AND OBJECTIVE BOX
SUBJECTIVE:    Patient is a 51y old Female who presents with a chief complaint of COVID, BRADYCARDIA     (23 May 2022 15:56)    Currently admitted to medicine with the primary diagnosis of COVID       Today is hospital day 6d. This morning she is resting comfortably in bed and reports no new issues or overnight events.         PAST MEDICAL & SURGICAL HISTORY  H/O Raynaud&#x27;s syndrome    Epilepsy    No significant past surgical history      SOCIAL HISTORY:    ALLERGIES:  Originally Entered as [DYSPNEA] reaction to [sari] (Unknown)  penicillin (Other)    MEDICATIONS:  STANDING MEDICATIONS  ALPRAZolam 2 milliGRAM(s) Oral at bedtime  chlorhexidine 4% Liquid 1 Application(s) Topical <User Schedule>  enoxaparin Injectable 40 milliGRAM(s) SubCutaneous every 24 hours  lactated ringers. 1000 milliLiter(s) IV Continuous <Continuous>  ondansetron Injectable 4 milliGRAM(s) IV Push once  pantoprazole   Suspension 40 milliGRAM(s) Oral two times a day  polyethylene glycol 3350 17 Gram(s) Oral daily  senna 2 Tablet(s) Oral at bedtime  topiramate 300 milliGRAM(s) Oral daily    PRN MEDICATIONS  acetaminophen     Tablet .. 650 milliGRAM(s) Oral every 6 hours PRN  aluminum hydroxide/magnesium hydroxide/simethicone Suspension 30 milliLiter(s) Oral every 6 hours PRN  ibuprofen  Tablet. 600 milliGRAM(s) Oral every 6 hours PRN  ondansetron Injectable 4 milliGRAM(s) IV Push every 8 hours PRN  sucralfate 1 Gram(s) Oral four times a day PRN    VITALS:   T(F): 96.5  HR: 39  BP: 107/64  RR: 18  SpO2: --    LABS:  Negative for smoking/alcohol/drug use.                         12.3   3.95  )-----------( 285      ( 22 May 2022 06:29 )             35.5     05-22    141  |  108  |  14  ----------------------------<  83  4.8   |  25  |  0.8    Ca    9.3      22 May 2022 06:29  Phos  4.0     05-22  Mg     2.0     05-22    TPro  6.0  /  Alb  3.9  /  TBili  0.4  /  DBili  x   /  AST  11  /  ALT  8   /  AlkPhos  59  05-22      RADIOLOGY:  CT Heart without Coronaries w/ IV Cont (05.21.22 @ 11:40)   IMPRESSION:  1.  Normal coronary arteries  2. Total coronary calcium score is 0.      CT Head No Cont (05.20.22 @ 12:09)   IMPRESSION:  No acute intracranial pathology. No evidence of midline shift, mass effect or intracranial hemorrhage.  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      VA Duplex Lower Ext Vein Scan, Bilat (05.19.22 @ 11:14)   IMPRESSION:  No evidence of deep venous thrombosis in either lower extremity.      CT Abdomen and Pelvis No Cont (05.18.22 @ 17:27)   IMPRESSION:  No definite acute abnormality in the abdomen and pelvis.        PHYSICAL EXAM:  GEN: Pt was seen and examined at bedside. Pleasant  HEENT: normocephalic, atraumatic, aniceteric  LUNGS: Clear to auscultation bilaterally, no rales/wheezing/ rhonchi  HEART: S1/S2 present. RRR, no murmurs  ABD: Soft, non-tender, non-distended. Bowel sounds present  EXT: No LE edema, moves all extremities  NEURO: Alert and awake, follow commands, normal affect      ASSESSMENT AND PLAN:  51 year old female patient with Epilepsy and Raynaud Phenomenon who presented to the ED on 05/18 for evaluation of weakness in setting of recent COVID positive test at home, fever, chills, diarrhea, and cough, found to have sinus bradycardia but otherwise insignificant chest and abdominal imaging, to be admitted for further investigations, management, and monitoring. Currently still in sinus bradycardia around 50bpm.    #Sinus Bradycardia (in Setting of Recent COVID Positive Test + On Diltiazem for Raynaud)  #Weakness/Lightheadedness, resolving  #History of Sinus Bradycardia After First Pfizer Vaccine Dose while being on Diltiazem  -History of Sinus Bradycardia down to HR 30's bpm few months ago after 1st dose of Pfizer Vaccine s/p Admission to [facility] s/p discharge off Diltiazem (but has resumed it herself) and advised against taking 2nd COVID vaccine/ booster  -Home med Diltiazem 120mg for Raynaud  -ED HR went down to 38bpm (asymptomatic; symptoms stable since 2 weeks)  -ECG sinus bradycardia with no block  -Cardiology evaluation: per patient, she has been evaluated at [facility] and etiology was thought to be related to 1st dose of Pfizer vaccine (asked to avoid further shots) s/p discharge of Diltiazem 120mg QD (but patient resumed it at home)  -TSH:  wnl;   lyme negative  -Cardiac CT:  normal coronary arteries  -CTH:  negative for acute pathology  -ECHO:  normal EF    -continue Holding Diltiazem 120mg QD and follow up outpatient with Dr Bullard post discharge  -Avoid Beta blocking agents  -Keep atropine at bedside to be used for symptomatic bradycardia or very low HR  -Monitor tele  -Monitor HR  -Cardiac MRI to r/o infiltrative cardiac process and Exercise Stress test and f/u EP recs    #Weakness in Setting of Diarrhea, Diarrhea resolved  -Onset: 05/09: fever x3 days last week M-W, chills, dry cough, diarrhea since last week (improving), abdominal discomfort (no sore throat or runny nose)  -Recent AB use for UTI last month for 7-10 days (unsure about name of Ab)  -/68 mmHg, HR down to 38 bpm -> currently 50 bpm, T 36.1, SaO2 98% on RA  -CXR no acute process  -CT AP without contrast no intraabdominal process  -S/P 1L NS and Zofran in ED    #Recent COVID Positive Home Test  #COVID PCR 05/18, negative 5/20  #Leukopenia  -Onset: 05/09: fever x3 days last week M-W, chills, dry cough, diarrhea since last week (improving), abdominal discomfort (no sore throat or runny nose)  -Vaccinated against COVID: 1x Pfizer only due to complication as above  -ED HR went down to 38bpm (asymptomatic; symptoms stable since 2 weeks)  -ED Labs WBC 2.91  -CXR no acute process  -LE duplex: negative    -no sxs    #Epilepsy  -Free of seizures for a long time  -Follows with Dr Angel  -On Topiramate 300mg QD    -Outpatient follow up with Dr Angel  -c/w Topiramate 300mg QD    Raynaud Syndrome  -Follows with Dr Bullard  -On Diltiazem 120mg     -Hold Diltiazem 120mg QD and follow up outpatient with Dr Bullard post discharge    Others  - DVT Prophylaxis: Lovenox 40mg Subcutaneously daily  - GI Prophylaxis: Pantoprazole 40mg PO QD  - Diet: Gluten free diet  - Code Status: Full      Provider Handoff: (Pending) - follow up:  -Cardiac MRI to r/o infiltrative cardiac process  -midline placed today  -f/u exercise stress test  -f/u EP recs post MRI/stress test   SUBJECTIVE:    Patient is a 51y old Female who presents with a chief complaint of COVID, BRADYCARDIA     (23 May 2022 15:56)    Currently admitted to medicine with the primary diagnosis of COVID       Today is hospital day 6d. This morning she is resting comfortably in bed and reports no new issues or overnight events.         PAST MEDICAL & SURGICAL HISTORY  H/O Raynaud&#x27;s syndrome    Epilepsy    No significant past surgical history      SOCIAL HISTORY:    ALLERGIES:  Originally Entered as [DYSPNEA] reaction to [sari] (Unknown)  penicillin (Other)    MEDICATIONS:  STANDING MEDICATIONS  ALPRAZolam 2 milliGRAM(s) Oral at bedtime  chlorhexidine 4% Liquid 1 Application(s) Topical <User Schedule>  enoxaparin Injectable 40 milliGRAM(s) SubCutaneous every 24 hours  lactated ringers. 1000 milliLiter(s) IV Continuous <Continuous>  ondansetron Injectable 4 milliGRAM(s) IV Push once  pantoprazole   Suspension 40 milliGRAM(s) Oral two times a day  polyethylene glycol 3350 17 Gram(s) Oral daily  senna 2 Tablet(s) Oral at bedtime  topiramate 300 milliGRAM(s) Oral daily    PRN MEDICATIONS  acetaminophen     Tablet .. 650 milliGRAM(s) Oral every 6 hours PRN  aluminum hydroxide/magnesium hydroxide/simethicone Suspension 30 milliLiter(s) Oral every 6 hours PRN  ibuprofen  Tablet. 600 milliGRAM(s) Oral every 6 hours PRN  ondansetron Injectable 4 milliGRAM(s) IV Push every 8 hours PRN  sucralfate 1 Gram(s) Oral four times a day PRN    VITALS:   T(F): 96.5  HR: 39  BP: 107/64  RR: 18  SpO2: --    LABS:  Negative for smoking/alcohol/drug use.                         12.3   3.95  )-----------( 285      ( 22 May 2022 06:29 )             35.5     05-22    141  |  108  |  14  ----------------------------<  83  4.8   |  25  |  0.8    Ca    9.3      22 May 2022 06:29  Phos  4.0     05-22  Mg     2.0     05-22    TPro  6.0  /  Alb  3.9  /  TBili  0.4  /  DBili  x   /  AST  11  /  ALT  8   /  AlkPhos  59  05-22      RADIOLOGY:  CT Heart without Coronaries w/ IV Cont (05.21.22 @ 11:40)   IMPRESSION:  1.  Normal coronary arteries  2. Total coronary calcium score is 0.      CT Head No Cont (05.20.22 @ 12:09)   IMPRESSION:  No acute intracranial pathology. No evidence of midline shift, mass effect or intracranial hemorrhage.  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      VA Duplex Lower Ext Vein Scan, Bilat (05.19.22 @ 11:14)   IMPRESSION:  No evidence of deep venous thrombosis in either lower extremity.      CT Abdomen and Pelvis No Cont (05.18.22 @ 17:27)   IMPRESSION:  No definite acute abnormality in the abdomen and pelvis.        PHYSICAL EXAM:  GEN: Pt was seen and examined at bedside. Pleasant  HEENT: normocephalic, atraumatic, aniceteric  LUNGS: Clear to auscultation bilaterally, no rales/wheezing/ rhonchi  HEART: S1/S2 present. RRR, no murmurs  ABD: Soft, non-tender, non-distended. Bowel sounds present  EXT: No LE edema, moves all extremities  NEURO: Alert and awake, follow commands, normal affect      ASSESSMENT AND PLAN:  51 year old female patient with Epilepsy and Raynaud Phenomenon who presented to the ED on 05/18 for evaluation of weakness in setting of recent COVID positive test at home, fever, chills, diarrhea, and cough, found to have sinus bradycardia but otherwise insignificant chest and abdominal imaging, to be admitted for further investigations, management, and monitoring. Currently still in sinus bradycardia around 50bpm.    #Sinus Bradycardia (in Setting of Recent COVID Positive Test + On Diltiazem for Raynaud)  #Weakness/Lightheadedness, resolving  #History of Sinus Bradycardia After First Pfizer Vaccine Dose while being on Diltiazem  -History of Sinus Bradycardia down to HR 30's bpm few months ago after 1st dose of Pfizer Vaccine s/p Admission to [facility] s/p discharge off Diltiazem (but has resumed it herself) and advised against taking 2nd COVID vaccine/ booster  -Home med Diltiazem 120mg for Raynaud  -ED HR went down to 38bpm (asymptomatic; symptoms stable since 2 weeks)  -ECG sinus bradycardia with no block  -Cardiology evaluation: per patient, she has been evaluated at [facility] and etiology was thought to be related to 1st dose of Pfizer vaccine (asked to avoid further shots) s/p discharge of Diltiazem 120mg QD (but patient resumed it at home)  -TSH:  wnl;   lyme negative  -Cardiac CT:  normal coronary arteries  -CTH:  negative for acute pathology  -ECHO:  normal EF    -continue Holding Diltiazem 120mg QD and follow up outpatient with Dr Bullard post discharge  -Avoid Beta blocking agents  -Keep atropine at bedside to be used for symptomatic bradycardia or very low HR  -Monitor tele  -Monitor HR  -Cardiac MRI to r/o infiltrative cardiac process and Exercise Stress test and f/u EP recs --> EP cleared pt for outpatient follow up to review cardiac MRI results    #Weakness in Setting of Diarrhea, Diarrhea resolved  -Onset: 05/09: fever x3 days last week M-W, chills, dry cough, diarrhea since last week (improving), abdominal discomfort (no sore throat or runny nose)  -Recent AB use for UTI last month for 7-10 days (unsure about name of Ab)  -/68 mmHg, HR down to 38 bpm -> currently 50 bpm, T 36.1, SaO2 98% on RA  -CXR no acute process  -CT AP without contrast no intraabdominal process  -S/P 1L NS and Zofran in ED    #Recent COVID Positive Home Test  #COVID PCR 05/18, negative 5/20  #Leukopenia  -Onset: 05/09: fever x3 days last week M-W, chills, dry cough, diarrhea since last week (improving), abdominal discomfort (no sore throat or runny nose)  -Vaccinated against COVID: 1x Pfizer only due to complication as above  -ED HR went down to 38bpm (asymptomatic; symptoms stable since 2 weeks)  -ED Labs WBC 2.91  -CXR no acute process  -LE duplex: negative    -no sxs    #Epilepsy  -Free of seizures for a long time  -Follows with Dr Angel  -On Topiramate 300mg QD    -Outpatient follow up with Dr Angel  -c/w Topiramate 300mg QD    #Raynaud Syndrome  -Follows with Dr Bullard  -On Diltiazem 120mg     -Hold Diltiazem 120mg QD and follow up outpatient with Dr Bullard post discharge      Others  - DVT Prophylaxis: Lovenox 40mg Subcutaneously daily  - GI Prophylaxis: Pantoprazole 40mg PO QD  - Diet: Gluten free diet  - Code Status: Full      Provider Handoff: (Pending) - follow up:  -o/p EP follow up and PCP follow up

## 2022-05-24 NOTE — DISCHARGE NOTE PROVIDER - HOSPITAL COURSE
51 year old female known to have:  - Epilepsy. Seizure-free for a long time. Follows with Dr Angel. On Topiramate 300mg QD  - Raynaud Syndrome. Follows with Dr Bullard. On Diltiazem 120mg   - History of Sinus Bradycardia down to HR 30's bpm few months ago after 1st dose of Pfizer Vaccine s/p Admission to [facility] s/p discharge off Diltiazem (but has resumed it herself) and advised against taking 2nd COVID vaccine/ booster      She presented to the ED on 05/18 for evaluation of weakness in setting of recent COVID positive test on 05/09.  History goes back to 05/09 when the patient tested positive for COVID at home.  She reports a low grade fever last Monday until Wednesday associated with chills, dry cough, nausea, diffuse abdominal discomfort (burning), and watery loose stools (around 5+ times per day since last Monday; now getting softer and less frequent; last had 3 episodes on 05/18 per patient).  Since then, she reports progressive weakness and reduced PO intake in the absence of vomiting.      On review of systems, patient reports a recent UTI last month s/p 7-10 day of an AB course (couldn't recall name).  She also reports intermittent chest tightness, skipping sensation, and light headedness on exertion.  She otherwise denies any recent night sweats, URTI symptoms (rhinorrhea, sore throat), headache.   No sick contacts.   No recent travel or exposure to recent travelers.        51 year old female patient with Epilepsy and Raynaud Phenomenon who presented to the ED on 05/18 for evaluation of weakness in setting of recent COVID positive test at home, fever, chills, diarrhea, and cough, found to have sinus bradycardia but otherwise insignificant chest and abdominal imaging, to be admitted for further investigations, management, and monitoring. Currently still in sinus bradycardia around 50bpm.    #Sinus Bradycardia (in Setting of Recent COVID Positive Test + On Diltiazem for Raynaud)  #Weakness/Lightheadedness, resolving  #History of Sinus Bradycardia After First Pfizer Vaccine Dose while being on Diltiazem  -History of Sinus Bradycardia down to HR 30's bpm few months ago after 1st dose of Pfizer Vaccine s/p Admission to [facility] s/p discharge off Diltiazem (but has resumed it herself) and advised against taking 2nd COVID vaccine/ booster  -Home med Diltiazem 120mg for Raynaud  -ED HR went down to 38bpm (asymptomatic; symptoms stable since 2 weeks)  -ECG sinus bradycardia with no block  -Cardiology evaluation: per patient, she has been evaluated at [facility] and etiology was thought to be related to 1st dose of Pfizer vaccine (asked to avoid further shots) s/p discharge of Diltiazem 120mg QD (but patient resumed it at home)  -TSH:  wnl;   lyme negative  -Cardiac CT:  normal coronary arteries  -CTH:  negative for acute pathology  -ECHO:  normal EF    -continue Holding Diltiazem 120mg QD and follow up outpatient with Dr Blulard post discharge  -Avoid Beta blocking agents  -Keep atropine at bedside to be used for symptomatic bradycardia or very low HR  -Monitor tele  -Monitor HR  -Cardiac MRI to r/o infiltrative cardiac process and Exercise Stress test and f/u EP recs --> EP cleared pt for outpatient follow up to review cardiac MRI results    #Weakness in Setting of Diarrhea, Diarrhea resolved  -Onset: 05/09: fever x3 days last week M-W, chills, dry cough, diarrhea since last week (improving), abdominal discomfort (no sore throat or runny nose)  -Recent AB use for UTI last month for 7-10 days (unsure about name of Ab)  -/68 mmHg, HR down to 38 bpm -> currently 50 bpm, T 36.1, SaO2 98% on RA  -CXR no acute process  -CT AP without contrast no intraabdominal process  -S/P 1L NS and Zofran in ED    #Recent COVID Positive Home Test  #COVID PCR 05/18, negative 5/20  #Leukopenia  -Onset: 05/09: fever x3 days last week M-W, chills, dry cough, diarrhea since last week (improving), abdominal discomfort (no sore throat or runny nose)  -Vaccinated against COVID: 1x Pfizer only due to complication as above  -ED HR went down to 38bpm (asymptomatic; symptoms stable since 2 weeks)  -ED Labs WBC 2.91  -CXR no acute process  -LE duplex: negative    -no sxs    #Epilepsy  -Free of seizures for a long time  -Follows with Dr Angel  -On Topiramate 300mg QD    -Outpatient follow up with Dr Angel  -c/w Topiramate 300mg QD    Raynaud Syndrome  -Follows with Dr Bullard  -On Diltiazem 120mg     -Hold Diltiazem 120mg QD and follow up outpatient with Dr Bullard post discharge      Labs, physical exam, and vitals stable for discharge. Pt to follow up with PCP and EP o/p to review MRI results and for further management. Pt to discontinue diltiazem at this time, as was discontinued inpatient. Med rec reviewed with primary medical attending.

## 2022-05-24 NOTE — DISCHARGE NOTE PROVIDER - DETAILS OF MALNUTRITION DIAGNOSIS/DIAGNOSES
This patient has been assessed with a concern for Malnutrition and was treated during this hospitalization for the following Nutrition diagnosis/diagnoses:     -  05/23/2022: Mild protein-calorie malnutrition   -  05/23/2022: Underweight (BMI < 19)

## 2022-05-24 NOTE — DISCHARGE NOTE NURSING/CASE MANAGEMENT/SOCIAL WORK - NSDCPEFALRISK_GEN_ALL_CORE
For information on Fall & Injury Prevention, visit: [email or web address] OR  [email or web address] OR  https://www.cdc.gov/steadi/patient.html

## 2022-05-24 NOTE — DISCHARGE NOTE PROVIDER - PROVIDER TOKENS
PROVIDER:[TOKEN:[[id number]],FOLLOWUP:[1 week],ESTABLISHEDPATIENT:[T]],PROVIDER:[TOKEN:[[id number]],FOLLOWUP:[1 week],ESTABLISHEDPATIENT:[T]],PROVIDER:[TOKEN:[[id number]],FOLLOWUP:[1 week],ESTABLISHEDPATIENT:[T]] PROVIDER:[TOKEN:[[id number]],FOLLOWUP:[1 week],ESTABLISHEDPATIENT:[T]],PROVIDER:[TOKEN:[[id number]],FOLLOWUP:[1 week],ESTABLISHEDPATIENT:[T]],PROVIDER:[TOKEN:[[id number]],FOLLOWUP:[2 weeks]]

## 2022-05-24 NOTE — PROGRESS NOTE ADULT - ASSESSMENT
EP: Dr. Tenorio     51 year old female patient with Epilepsy and Raynaud Phenomenon, recent COVID,  who presented to the ED on 05/18 for evaluation of weakness. Admitted with symptomatic sinus bradycardia.     W/u: EST neg for chronotropic incompetence, TSH WNL. Lyme neg. CCTA with normal coronaries. EF 55-60%.     Impression:  symptomatic leidy 40-50s, now asymptomatic  recent COVID infection  Epilepsy  Raynoud     Plan:   - no further jorgensen from EP standpoint  - Will review results of cardiac MRI  - Ok for discharge from EP standpoint  - ambulate as tolerate  - Maintain electrolytes K>4.0 Mg >2.0  - no AVN blockers  - Fu with Dr. tenorio in 2 weeks, office to call pt with appt    EPS [id number]

## 2022-05-24 NOTE — PROGRESS NOTE ADULT - ASSESSMENT
51 year old female patient with Epilepsy and Raynaud Phenomenon who presented to the ED on 05/18 for evaluation of weakness in setting of recent COVID positive test at home, fever, chills, diarrhea, and cough, found to have sinus bradycardia    Sinus bradycardia  H/O Epilepsy  Recent Covid-19 infection  Mild protein-calorie malnutrition             PLAN:    ·	EP noted. Had recommended CTA coronaries, Cardiac MRI and Exercise stress test for chronotropic incompetence.  ·	Sinus bradycardia on tele  ·	CTA coronaries is normal  ·	Exercise size stress test was negative for chronotropic incompetence.   ·	Cardiac MRI done. Report is pending   ·	ECHO showed EF is 55-60%  ·	TSH is normal  ·	Con her regular diet.   ·	D/C home if cleared by EP  ·	Can cont her home meds on d/c.     * Med rec reviewed. Plan of care d/w the pt. Time spent 34 minutes.

## 2022-05-24 NOTE — DISCHARGE NOTE PROVIDER - NSDCMRMEDTOKEN_GEN_ALL_CORE_FT
topiramate 150 mg oral capsule, extended release: 300 milligram(s) orally once a day  Xanax 2 mg oral tablet: 1 tab(s) orally once a day, As Needed

## 2022-05-29 DIAGNOSIS — R19.7 DIARRHEA, UNSPECIFIED: ICD-10-CM

## 2022-05-29 DIAGNOSIS — I73.00 RAYNAUD'S SYNDROME WITHOUT GANGRENE: ICD-10-CM

## 2022-05-29 DIAGNOSIS — R00.1 BRADYCARDIA, UNSPECIFIED: ICD-10-CM

## 2022-05-29 DIAGNOSIS — T50.B95A ADVERSE EFFECT OF OTHER VIRAL VACCINES, INITIAL ENCOUNTER: ICD-10-CM

## 2022-05-29 DIAGNOSIS — E44.1 MILD PROTEIN-CALORIE MALNUTRITION: ICD-10-CM

## 2022-05-29 DIAGNOSIS — U07.1 COVID-19: ICD-10-CM

## 2022-05-29 DIAGNOSIS — G40.909 EPILEPSY, UNSPECIFIED, NOT INTRACTABLE, WITHOUT STATUS EPILEPTICUS: ICD-10-CM

## 2022-06-08 ENCOUNTER — APPOINTMENT (OUTPATIENT)
Dept: CARDIOLOGY | Facility: CLINIC | Age: 52
End: 2022-06-08

## 2022-06-09 ENCOUNTER — APPOINTMENT (OUTPATIENT)
Dept: CARDIOLOGY | Facility: CLINIC | Age: 52
End: 2022-06-09

## 2022-06-15 NOTE — DISCHARGE NOTE PROVIDER - NSDCDCMDCOMP_GEN_ALL_CORE
This document is complete and the patient is ready for discharge. Enbrel Pregnancy And Lactation Text: This medication is Pregnancy Category B and is considered safe during pregnancy. It is unknown if this medication is excreted in breast milk.

## 2022-06-21 ENCOUNTER — OUTPATIENT (OUTPATIENT)
Dept: OUTPATIENT SERVICES | Facility: HOSPITAL | Age: 52
LOS: 1 days | Discharge: HOME | End: 2022-06-21
Payer: MEDICARE

## 2022-06-21 ENCOUNTER — RESULT REVIEW (OUTPATIENT)
Age: 52
End: 2022-06-21

## 2022-06-21 DIAGNOSIS — M51.16 INTERVERTEBRAL DISC DISORDERS WITH RADICULOPATHY, LUMBAR REGION: ICD-10-CM

## 2022-06-21 DIAGNOSIS — M25.512 PAIN IN LEFT SHOULDER: ICD-10-CM

## 2022-06-21 DIAGNOSIS — M46.1 SACROILIITIS, NOT ELSEWHERE CLASSIFIED: ICD-10-CM

## 2022-06-21 DIAGNOSIS — M53.3 SACROCOCCYGEAL DISORDERS, NOT ELSEWHERE CLASSIFIED: ICD-10-CM

## 2022-06-21 PROCEDURE — 73221 MRI JOINT UPR EXTREM W/O DYE: CPT | Mod: 26,LT

## 2022-06-24 ENCOUNTER — APPOINTMENT (OUTPATIENT)
Dept: VASCULAR SURGERY | Facility: CLINIC | Age: 52
End: 2022-06-24

## 2022-06-24 VITALS — WEIGHT: 100 LBS | HEIGHT: 65 IN | BODY MASS INDEX: 16.66 KG/M2

## 2022-06-24 VITALS — DIASTOLIC BLOOD PRESSURE: 84 MMHG | SYSTOLIC BLOOD PRESSURE: 116 MMHG

## 2022-06-24 DIAGNOSIS — I73.00 RAYNAUD'S SYNDROME W/OUT GANGRENE: ICD-10-CM

## 2022-06-24 DIAGNOSIS — Z86.018 PERSONAL HISTORY OF OTHER BENIGN NEOPLASM: ICD-10-CM

## 2022-06-24 PROCEDURE — 99204 OFFICE O/P NEW MOD 45 MIN: CPT

## 2022-06-28 DIAGNOSIS — M25.519 PAIN IN UNSPECIFIED SHOULDER: ICD-10-CM

## 2022-06-30 ENCOUNTER — APPOINTMENT (OUTPATIENT)
Dept: ORTHOPEDIC SURGERY | Facility: CLINIC | Age: 52
End: 2022-06-30

## 2022-06-30 DIAGNOSIS — S43.439A SUPERIOR GLENOID LABRUM LESION OF UNSPECIFIED SHOULDER, INITIAL ENCOUNTER: ICD-10-CM

## 2022-06-30 PROCEDURE — ZZZZZ: CPT

## 2022-06-30 PROCEDURE — 99213 OFFICE O/P EST LOW 20 MIN: CPT | Mod: 25

## 2022-06-30 PROCEDURE — 20610 DRAIN/INJ JOINT/BURSA W/O US: CPT | Mod: LT

## 2022-06-30 NOTE — PROCEDURE
[Left] : of the left [Shoulder] : shoulder [Pain] : pain [Alcohol] : alcohol [____] : [unfilled] [] : Patient tolerated procedure well [Call if redness, pain or fever occur] : call if redness, pain or fever occur [Apply ice for 15min out of every hour for the next 12-24 hours as tolerated] : apply ice for 15 minutes out of every hour for the next 12-24 hours as tolerated

## 2022-07-08 ENCOUNTER — APPOINTMENT (OUTPATIENT)
Dept: ORTHOPEDIC SURGERY | Facility: CLINIC | Age: 52
End: 2022-07-08

## 2022-07-08 VITALS — WEIGHT: 100 LBS | HEIGHT: 65 IN | BODY MASS INDEX: 16.66 KG/M2

## 2022-07-08 PROCEDURE — 99214 OFFICE O/P EST MOD 30 MIN: CPT

## 2022-07-15 ENCOUNTER — APPOINTMENT (OUTPATIENT)
Dept: PAIN MANAGEMENT | Facility: CLINIC | Age: 52
End: 2022-07-15

## 2022-07-15 VITALS
HEART RATE: 66 BPM | SYSTOLIC BLOOD PRESSURE: 103 MMHG | BODY MASS INDEX: 16.66 KG/M2 | HEIGHT: 65 IN | DIASTOLIC BLOOD PRESSURE: 74 MMHG | WEIGHT: 100 LBS

## 2022-07-15 DIAGNOSIS — G40.909 EPILEPSY, UNSPECIFIED, NOT INTRACTABLE, W/OUT STATUS EPILEPTICUS: ICD-10-CM

## 2022-07-15 DIAGNOSIS — Z87.09 PERSONAL HISTORY OF OTHER DISEASES OF THE RESPIRATORY SYSTEM: ICD-10-CM

## 2022-07-15 DIAGNOSIS — Z87.39 PERSONAL HISTORY OF OTHER DISEASES OF THE MUSCULOSKELETAL SYSTEM AND CONNECTIVE TISSUE: ICD-10-CM

## 2022-07-15 DIAGNOSIS — Z78.9 OTHER SPECIFIED HEALTH STATUS: ICD-10-CM

## 2022-07-15 LAB
AMPHET UR-MCNC: NORMAL
BARBITURATES UR-MCNC: NORMAL
BENZODIAZ UR-MCNC: NORMAL
COCAINE METAB.OTHER UR-MCNC: NORMAL
DRUG SCREEN, URINE ROUTINE: NORMAL
METHADONE UR-MCNC: NORMAL
METHAQUALONE UR-MCNC: NORMAL
OPIATES UR-MCNC: NORMAL
PCP UR-MCNC: NORMAL
PROPOXYPH UR QL: NORMAL
THC UR QL: NORMAL

## 2022-07-15 PROCEDURE — 99214 OFFICE O/P EST MOD 30 MIN: CPT

## 2022-07-15 RX ORDER — ALBUTEROL SULFATE 90 UG/1
108 (90 BASE) INHALANT RESPIRATORY (INHALATION)
Qty: 7 | Refills: 0 | Status: ACTIVE | COMMUNITY
Start: 2022-03-03
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
## 2022-07-15 NOTE — PHYSICAL EXAM
[Normal Coordination] : normal coordination [Normal DTR UE/LE] : normal DTR UE/LE  [Normal Sensation] : normal sensation [Normal Mood and Affect] : normal mood and affect [Orientated] : orientated [Able to Communicate] : able to communicate [Normal Skin] : normal skin [No Rash] : no rash [No Ulcers] : no ulcers [No Lesions] : no lesions [No obvious lymphadenopathy in areas examined] : no obvious lymphadenopathy in areas examined [Well Developed] : well developed [Well Nourished] : well nourished [Left] : left shoulder [FreeTextEntry3] : Palpation of the thoracic/lumbar spine is as follows: bilateral SI joint tenderness. Range of motion of the thoracic and lumbar spine is as follows: Diminished range of motion in all planes. pain with bending to the right, stiffness with bending to the right, pain with bending to the left and stiffness with bending to the left. Special testing of the thoracic and lumbar spine is as follows: Alireza testing is positive for reproduction of pain into the groin bilaterally. Alireza testing is positive for reproduction of pain at the PSIS, and there is a postive Alberto Finger test and a positive Gaenslen's test bilaterally. Gait and function is as follows: patient ambulates without assistive device and mildly antalgic gait.  [] : motor and sensory intact distally [TWNoteComboBox7] : active forward flexion 80 degrees [TWNoteComboBox4] : passive forward flexion 90 degrees [de-identified] : active abduction 45 degrees

## 2022-07-15 NOTE — DATA REVIEWED
[FreeTextEntry1] : MRI of the left shoulder dated 6/21/22 shows evidence of adhesive capsulitis. Tear of the superior labrum extending to involve the posterior superior labrum. Degenerative cahnge in the acromioclavicular joint. \par \par MRI Lumbar Spine 12/2020: MRI of the lumbar spine reportedly show right foraminal herniation and disc bulge at L2-L3 extending to the lateral recess and foramina abutting the exiting right L2 nerve root. There is also posterior disc herniation with left foraminal annular tear L4-L5 and disc bulge at L3-L4.\par \par EMG of the lower extremities 3/18/2019 revealed L4-L5 lumbar radiculopathy. EMG of the upper extremities revealed C5-C6 cervical radiculopathy. \par \par SOAPP: 06/30/2021 low risk . 7/15/22 LOW RISK\par \par [place] REGISTRY: Consistent .\par \par UDS: 7/15/22 negative all. \par \par Medications that trigger a UDS: Benzodiazepines (Ativan, Xanax, Valium) etc, Barbiturates, Narcotics (Avinza, Butrans, hydrocodone, Codeine, Lexi, Methadone, Morphine, MS Contin, Opana, oxycodone, Oxycontin, Suboxone etc), Pregabalin (Lyrica), Tramadol (Ultacet, Utram etc), Tapentadol, (Nucynta) and Elist Drugs (cocaine, THC, Etc.)\par \par Risk factors: Bipolar Illness, positive for any an illicit drugs, history of any ETOH and drug abuse, any signs of diversion, Sharing Meds, selling meds. Non consistent [place] drug reporting and above 120meq of morphine.

## 2022-07-15 NOTE — REASON FOR VISIT
[Initial Consultation] : an initial pain management consultation [FreeTextEntry2] : Patient presents to office with left shoulder pain .

## 2022-07-15 NOTE — ASSESSMENT
[FreeTextEntry1] : Patient is a 51 year old woman who presents today with chief complaint of left shoulder pain secondary to labrum tear and adhesive capsulitis. Pain is severe and is affecting her ADL's. She is advised to continue to follow up with orthopedics. Physical therapy targeting the left shoulder is advised for her to continue. Percocet 5-325mg BID; 2 week supply will be given to patient. UDS will be performed. She will follow up with orthopedics for furter treatment and with pain management as needed. \par \par All of this patient's questions were answered and the conversation was understood well.\par \par Medications were given at today's visit.\par \par I advised the patient they must keep their medication under a lock and key, or in a safe place away from children and other individuals. This medication given is solely for the patient and under no circumstances to be shared. Patient verbalized this and is agreement with the aforementioned. I explained the risk of addiction, tolerance and withdrawals. UDS will be done randomly and a drug agreement was signed. \par \par I explained the risk of addiction, tolerance and withdrawals. UDS will be done randomly and a drug agreement was signed.  \par \par No interventions ordered at this visit.\par \par No imaging was ordered at this visit.\par \par We encourage a home exercise program, stressing walking and strengthening of the core. We have recommended exercises such as the modified plank, Racquel exercise, elliptical , recumbent bike, as well as shoulder griddle strengthening. We discussed recreational activities such as swimming, Jorgito Chi and yoga. Use things that heat like hot shower or icy heat before rehab and exercising and at the beginning of the day, and ice (ice in a bag never directly on the skin) after activity and at the end of the day.\par \par \par Dennis Espinoza Scribe: Dr. Rivers *\par \par Entered by Dennis Espinoza, acting as scribe for Dr. Rivers.\par  \par The documentation recorded by the scribe, in my presence, accurately reflects the\par service I personally performed, and the decisions made by me with my edits as appropriate.\par  \par Thank you for allowing me to assist in the management of this patient.\par  \par Best Regards,\par  \par Marlen N. Rivers, M.D., FAAPMR\par  \par Diplomate, American Board of Physical Medicine and Rehabilitation\par Diplomate, American Board of Pain Medicine Diplomate, American Board of Pain Management\par \par

## 2022-07-18 NOTE — HISTORY OF PRESENT ILLNESS
[de-identified] : left shoulder pain\par getting worse\par wakes her up at night\par \par nad\par lue\par ff 0-140\par er 40\par ir l5\par weakness to scpstion/er\par \par mri left shoulder, pRCT, slap tear, ac jt arthritis\par \par plan\par went over findings and mri\par spoke about op vs nonop\par since pain and function getting worse, will proceed with surgery\par left shoulder arthroscopy, rotator cuff repair, decompression, distal clavicle excision\par \par Operative and nonoperative options discussed with patient. Surgical risks, benefits, and alternatives explained. Surgical risks include but are not exclusive to bleeding, infection, neurovascular damage, continued pain, stiffness, scarring, rsd, dvt/pe, potential failure of surgery that may require further surgery in the future. I went over incisions and rehabilitation. All questions answered. \par \par

## 2022-07-21 ENCOUNTER — APPOINTMENT (OUTPATIENT)
Dept: CARDIOLOGY | Facility: CLINIC | Age: 52
End: 2022-07-21

## 2022-07-28 ENCOUNTER — APPOINTMENT (OUTPATIENT)
Dept: ORTHOPEDIC SURGERY | Facility: CLINIC | Age: 52
End: 2022-07-28

## 2022-07-28 PROCEDURE — 99214 OFFICE O/P EST MOD 30 MIN: CPT

## 2022-07-29 ENCOUNTER — APPOINTMENT (OUTPATIENT)
Dept: ORTHOPEDIC SURGERY | Facility: CLINIC | Age: 52
End: 2022-07-29

## 2022-08-01 NOTE — HISTORY OF PRESENT ILLNESS
[de-identified] : left shoulder pain\par getting worse\par wakes her up at night\par \par nad\par lue\par ff 0-140\par er 40\par ir l5\par weakness to scpstion/er\par \par mri left shoulder, pRCT, slap tear, ac jt arthritis\par \par plan\par went over findings and mri\par spoke about op vs nonop\par since pain and function getting worse, will proceed with surgery, this is medically necessary as she has not been responding to cons tx\par left shoulder arthroscopy, rotator cuff repair, decompression, distal clavicle excision\par \par Operative and nonoperative options discussed with patient. Surgical risks, benefits, and alternatives explained. Surgical risks include but are not exclusive to bleeding, infection, neurovascular damage, continued pain, stiffness, scarring, rsd, dvt/pe, potential failure of surgery that may require further surgery in the future. I went over incisions and rehabilitation. All questions answered. \par \par

## 2022-08-07 ENCOUNTER — RX RENEWAL (OUTPATIENT)
Age: 52
End: 2022-08-07

## 2022-08-11 ENCOUNTER — EMERGENCY (EMERGENCY)
Facility: HOSPITAL | Age: 52
LOS: 0 days | Discharge: HOME | End: 2022-08-11
Attending: EMERGENCY MEDICINE | Admitting: EMERGENCY MEDICINE

## 2022-08-11 VITALS
SYSTOLIC BLOOD PRESSURE: 122 MMHG | DIASTOLIC BLOOD PRESSURE: 71 MMHG | TEMPERATURE: 98 F | HEART RATE: 83 BPM | OXYGEN SATURATION: 99 % | RESPIRATION RATE: 18 BRPM

## 2022-08-11 VITALS
OXYGEN SATURATION: 100 % | HEART RATE: 89 BPM | TEMPERATURE: 98 F | RESPIRATION RATE: 95 BRPM | DIASTOLIC BLOOD PRESSURE: 78 MMHG | WEIGHT: 98.11 LBS | SYSTOLIC BLOOD PRESSURE: 130 MMHG | HEIGHT: 65 IN

## 2022-08-11 DIAGNOSIS — G40.909 EPILEPSY, UNSPECIFIED, NOT INTRACTABLE, WITHOUT STATUS EPILEPTICUS: ICD-10-CM

## 2022-08-11 DIAGNOSIS — M25.512 PAIN IN LEFT SHOULDER: ICD-10-CM

## 2022-08-11 DIAGNOSIS — Z88.0 ALLERGY STATUS TO PENICILLIN: ICD-10-CM

## 2022-08-11 PROCEDURE — 99284 EMERGENCY DEPT VISIT MOD MDM: CPT

## 2022-08-11 PROCEDURE — 73030 X-RAY EXAM OF SHOULDER: CPT | Mod: 26,LT

## 2022-08-11 RX ORDER — KETOROLAC TROMETHAMINE 30 MG/ML
30 SYRINGE (ML) INJECTION ONCE
Refills: 0 | Status: DISCONTINUED | OUTPATIENT
Start: 2022-08-11 | End: 2022-08-11

## 2022-08-11 RX ORDER — CELECOXIB 200 MG/1
1 CAPSULE ORAL
Qty: 20 | Refills: 0
Start: 2022-08-11 | End: 2022-08-20

## 2022-08-11 RX ORDER — CYCLOBENZAPRINE HYDROCHLORIDE 10 MG/1
1 TABLET, FILM COATED ORAL
Qty: 20 | Refills: 0
Start: 2022-08-11 | End: 2022-08-17

## 2022-08-11 RX ORDER — OXYCODONE AND ACETAMINOPHEN 5; 325 MG/1; MG/1
1 TABLET ORAL
Qty: 12 | Refills: 0
Start: 2022-08-11 | End: 2022-08-13

## 2022-08-11 RX ORDER — DEXAMETHASONE 0.5 MG/5ML
8 ELIXIR ORAL ONCE
Refills: 0 | Status: COMPLETED | OUTPATIENT
Start: 2022-08-11 | End: 2022-08-11

## 2022-08-11 RX ORDER — NALOXONE HYDROCHLORIDE 4 MG/.1ML
4 SPRAY NASAL
Qty: 1 | Refills: 0
Start: 2022-08-11

## 2022-08-11 RX ORDER — OXYCODONE AND ACETAMINOPHEN 5; 325 MG/1; MG/1
1 TABLET ORAL ONCE
Refills: 0 | Status: DISCONTINUED | OUTPATIENT
Start: 2022-08-11 | End: 2022-08-11

## 2022-08-11 RX ADMIN — Medication 8 MILLIGRAM(S): at 15:13

## 2022-08-11 RX ADMIN — OXYCODONE AND ACETAMINOPHEN 1 TABLET(S): 5; 325 TABLET ORAL at 15:13

## 2022-08-11 RX ADMIN — Medication 30 MILLIGRAM(S): at 15:14

## 2022-08-11 NOTE — ED PROVIDER NOTE - PATIENT PORTAL LINK FT
You can access the FollowMyHealth Patient Portal offered by [facility] by registering at the following website: [email or web address]. By joining [facility]’s FollowMyHealth portal, you will also be able to view your health information using other applications (apps) compatible with our system.

## 2022-08-11 NOTE — ED PROVIDER NOTE - OBJECTIVE STATEMENT
51 year old female with pmh of seizure disorder presenting to the ED for L shoulder/scapular pain. Pt states that she is scheduled to undergo surgery for shoulder tear. Ortho is Dr. Barnett. 51 year old female with past medical history of seizure disorder presenting to the ED for L shoulder/scapular pain. Pt states that she is scheduled to undergo surgery for shoulder tear next month. Orthopedist is Dr. Barnett. States however that the pain has become worse, which prompted her visit to the ED today. Denies any new trauma or injury to the shoulder or scapula. Has taken oxycodone at home but states that this has not been helping much with the pain. Denies any paresthesias.

## 2022-08-11 NOTE — ED PROVIDER NOTE - CLINICAL SUMMARY MEDICAL DECISION MAKING FREE TEXT BOX
51-year-old female past medical history of seizure disorder presents to the emergency department with left shoulder, trapezius pain, and upper scapular pain.  Patient has chronic shoulder pain and follows with Dr. Otis Victoria and is scheduled for surgery next month.  Patient had an outpatient MRI that showed a SLAP tear to her shoulder and frozen shoulder.  Patient was taking medications and oxycodone at home but this was not relieving pain.  No fever or signs of infectious etiology.  No new injuries.  On exam, vital signs reviewed.  Patient is nontoxic-appearing.  Patient neurovascular intact but limited range of motion of shoulder due to pain.  X-rays taken and were negative.  Patient does not have any Percocet.  Will give anti-inflammatory and muscle relaxant with a short course of Percocet.    Patient is a good candidate to attempt outpatient management. Supportive care and home care discussed in detail. Patient aware they may have to return for re-evaluation and possible admission if outpatient treatment fails. Strict return precautions discussed.    Full DC instructions discussed and patient knows when to seek immediate medical attention.  Patient has proper follow up.  All results discussed and patient aware they may require further work up.  Proper follow up ensured. Limitations of ED work up discussed.  Medications administered and prescribed/OTC home meds discussed.  All questions and concerns from patient or family addressed. Understanding of instructions verbalized.

## 2022-08-11 NOTE — ED PROVIDER NOTE - NS ED ROS FT
Eyes:  No visual changes, eye pain or discharge.  ENMT:  No hearing changes, pain, discharge or infections. No neck pain or stiffness.  Cardiac:  No chest pain, SOB or edema. No chest pain with exertion.  Respiratory:  No cough or respiratory distress. No hemoptysis. No history of asthma or RAD.  GI:  No nausea, vomiting, diarrhea or abdominal pain.  :  No dysuria, frequency or burning.  MS:  (+) L shoulder and L scapula pain.  Neuro:  No headache or weakness.  No LOC. Eyes:  No visual changes, eye pain or discharge.  ENMT:  No hearing changes, pain, discharge or infections. No neck pain or stiffness.  Cardiac:  No chest pain, SOB or edema. No chest pain with exertion.  Respiratory:  No cough or respiratory distress. No hemoptysis. No history of asthma or RAD.  GI:  No nausea, vomiting, diarrhea or abdominal pain.  :  No dysuria, frequency or burning.  MS:  (+) L shoulder and L scapular pain.  Neuro:  No headache or weakness.  No LOC.

## 2022-08-11 NOTE — ED ADULT TRIAGE NOTE - PAIN RATING/NUMBER SCALE (0-10): REST
Clinic Follow up    Reason for follow-up:  The encounter diagnosis was Irritable bowel syndrome with diarrhea.    PCP: Primary Doctor No       HPI:  This is a 44 y.o. female here for follow up for diarrhea.      She has been seen by PA and other MD in clinic as well as me in the past.  She has had thorough work up and has been unrevealing.  She reports diarrhea and incontinence have been an issue for over 20 years since having gallbladder removed.  Colestopid helps sometimes.  Says she has lived off of imodium and it helps sometimes and sometimes she has to take several doses.  Takes it every day.    Sometimes can go 2 days without BM, then has formed stool and then turns into diarrhea with 4-5 BMs daily.     Has not tried metamucil before (although this was a recommendation in 2018).  Does not take fiber pills either.  Has taken probiotic in the past.        ROS:  CONSTITUTIONAL: Denies weight change,  fatigue, fevers, chills, night sweats.  EYES: No changes in vision.   ENT: No oral lesions or sore throat.  HEMATOLOGICAL/Lymph: Denies bleeding tendency, bruising tendency. No swellings or enlarged lymph nodes.  CARDIOVASCULAR: Denies chest pain, shortness of breath, orthopnea and edema.  RESPIRATORY: Denies cough, hemoptysis, dyspnea, and wheezing.  GI: See HPI.  : Denies dysuria and hematuria  MUSCULOSKELETAL: Denies joint pain or swelling, back pain and muscle pain.  SKIN: Denies rashes.  NEUROLOGIC: Denies headaches, seizures and numbness.  PSYCHIATRIC: Denies depression or anxiety.  ENDOCRINE: Denies heat or cold intolerance and excessive thirst or urination.    Medical History:   Past Medical History:   Diagnosis Date    Arthritis     DM (diabetes mellitus) 2009     03/01/2017 Insulin x 3 years 2013    Hepatitis C antibody positive in blood     Hyperlipidemia     Hypertension     IBS (irritable bowel syndrome)     Kidney stone     Marfan syndrome     Mitral valve prolapse        Surgical  "History:  Past Surgical History:   Procedure Laterality Date    ANGIOGRAPHY OF LOWER EXTREMITY N/A 2018    Procedure: ANGIOGRAM, LOWER EXTREMITY- LEFT LEG;  Surgeon: Roscoe Landeros MD;  Location: [facility] CATH LAB;  Service: Peripheral Vascular;  Laterality: N/A;    APPENDECTOMY      BUNIONECTOMY      both feet    cataract surgery  2019     SECTION      x 2    CHOLECYSTECTOMY      COLONOSCOPY N/A 2020    Procedure: COLONOSCOPY w/ biopsies;  Surgeon: Gio Georges MD;  Location: [facility] ENDO;  Service: Endoscopy;  Laterality: N/A;    ESOPHAGOGASTRODUODENOSCOPY N/A 2019    Procedure: ESOPHAGOGASTRODUODENOSCOPY (EGD);  Surgeon: Jaron Sanders III, MD;  Location: [facility] ENDO;  Service: Endoscopy;  Laterality: N/A;    TUBAL LIGATION         Family History:   Family History   Problem Relation Age of Onset    Heart disease Mother     Thrombophilia Father         DVT    Hypertension Father     Stroke Maternal Aunt     Heart disease Maternal Aunt     Stroke Maternal Uncle     Heart disease Maternal Uncle     Breast cancer Neg Hx     Colon cancer Neg Hx     Ovarian cancer Neg Hx        Social History:   Social History     Tobacco Use    Smoking status: Former Smoker     Packs/day: 0.20     Years: 20.00     Pack years: 4.00     Types: Cigarettes     Quit date: 2015     Years since quittin.5    Smokeless tobacco: Never Used    Tobacco comment: "once in a blue moon"   Substance Use Topics    Alcohol use: No     Alcohol/week: 0.0 standard drinks    Drug use: No       Allergies: Reviewed    Home Medications:   Medication List with Changes/Refills   New Medications    DIPHENOXYLATE-ATROPINE 2.5-0.025 MG (LOMOTIL) 2.5-0.025 MG PER TABLET    Take 1 tablet by mouth 4 (four) times daily as needed for Diarrhea.   Current Medications    ADMELOG SOLOSTAR U-100 INSULIN 100 UNIT/ML PEN        ASCORBIC ACID (VITAMIN C) 100 MG TABLET    Take 100 mg by mouth once daily.    ASPIRIN " "(ECOTRIN) 81 MG EC TABLET    Take 81 mg by mouth once daily.    ATENOLOL (TENORMIN) 25 MG TABLET    Take 1 tablet (25 mg total) by mouth once daily.    ATORVASTATIN (LIPITOR) 80 MG TABLET    TAKE 1 TABLET BY MOUTH ONCE DAILY.    BASAGLAR KWIKPEN U-100 INSULIN GLARGINE 100 UNITS/ML (3ML) SUBQ PEN        CETIRIZINE (ZYRTEC) 10 MG TABLET    Take 10 mg by mouth.    CLOPIDOGREL (PLAVIX) 75 MG TABLET    TAKE 1 TABLET (75 MG TOTAL) BY MOUTH ONCE DAILY.    COLESTIPOL (COLESTID) 1 GRAM TAB    TAKE 2 TABLETS BY MOUTH THREE TIMES DAILY. DO NOT TAKE OTHER MEDICATION WITHIN 1 HOUR BEFORE OR 4 HOURS AFTER TAKING COLESTIPOL.    DICLOFENAC SODIUM (VOLTAREN) 1 % GEL    Apply 2 g topically 3 (three) times daily.    DICYCLOMINE (BENTYL) 20 MG TABLET    TAKE 1 TABLET BY MOUTH 4 TIMES DAILY    EVOLOCUMAB (REPATHA SURECLICK) 140 MG/ML PNIJ    Inject 1 mL (140 mg total) into the skin every 14 (fourteen) days.    FAMOTIDINE (PEPCID) 20 MG TABLET    TAKE 1 TABLET BY MOUTH 2 (TWO) TIMES DAILY.    FLUOXETINE (PROZAC) 40 MG CAPSULE    TAKE 1 CAPSULE BY MOUTH ONCE DAILY    GABAPENTIN (NEURONTIN) 400 MG CAPSULE     TID    GLIPIZIDE (GLUCOTROL) 10 MG TABLET    Take 1 tablet (10 mg total) by mouth 2 (two) times daily.    INSULIN GLARGINE,HUM.REC.ANLOG (BASAGLAR KWIKPEN U-100 INSULIN SUBQ)    Inject 40 Units into the skin 2 (two) times daily.    KETOROLAC 0.5% (ACULAR) 0.5 % DROP        LIRAGLUTIDE 0.6 MG/0.1 ML, 18 MG/3 ML, SUBQ PNIJ (VICTOZA 2-DIAN) 0.6 MG/0.1 ML (18 MG/3 ML) PNIJ    Inject 1.2 Units into the skin.    LISINOPRIL 10 MG TABLET    TAKE 1 TABLET (10 MG TOTAL) BY MOUTH ONCE DAILY.    METOCLOPRAMIDE HCL (REGLAN) 10 MG TABLET    TAKE 1 TABLET BY MOUTH 2 (TWO) TIMES DAILY BEFORE MEALS.    NOVOLOG FLEXPEN U-100 INSULIN 100 UNIT/ML (3 ML) INPN PEN        ONDANSETRON (ZOFRAN) 4 MG TABLET    TAKE 1 TABLET (4 MG TOTAL) BY MOUTH 2 (TWO) TIMES DAILY.    PANTOPRAZOLE (PROTONIX) 40 MG TABLET    T1T PO ONCE DAILY    PEN NEEDLE 31 GAUGE X 5/16" NDLE " "   USE 5 TIMES DAILY WITH LANTUS AND HUMALOG    PROMETHAZINE (PHENERGAN) 25 MG TABLET        TIZANIDINE (ZANAFLEX) 4 MG TABLET    TAKE 1 TO 1 & 1/2 TABLET (4-6 MG TOTAL) BY MOUTH NIGHTLY AS NEEDED.    TRAMADOL (ULTRAM) 50 MG TABLET    TAKE 1 TABLET BY MOUTH EVERY 12 HOURS    TRUE METRIX GLUCOSE TEST STRIP STRP             Physical Exam:  Vital Signs:  /76   Ht 5' 11" (1.803 m)   Wt 95.9 kg (211 lb 6.7 oz)   BMI 29.49 kg/m²   Body mass index is 29.49 kg/m².      GENERAL: No acute distress, A&Ox3  EYES: Anicteric, no pallor noted.  ENT: OP clear  NECK: Supple, no masses, no thyromegally.  CHEST: Equal breath sounds bilaterally, no wheezing.  CARDIOVASCULAR: Regular rate and rhythm. Murmurs, rubs and gallops absent.  ABDOMEN: soft, non-tender, non-distended, normal bowel sounds, no hepatosplenomegaly   EXTREMITIES: No clubbing, cyanosis or edema.  SKIN: Without lesion or erythema.  LYMPH: No cervical, axillary lymphadenopathy palpable.   NEUROLOGICAL: Grossly normal, no asterixis present.    Labs: Pertinent labs reviewed.   Imaging Review:   X-ray Lumbar Complete With Flex And Ext    Result Date: 9/24/2020  EXAMINATION: XR LUMBAR SPINE 5 VIEW WITH FLEX AND EXT CLINICAL HISTORY: LSPINE PAIN;  Trochanteric bursitis, left hip TECHNIQUE: Five views of the lumbar spine plus flexion extension views were performed. COMPARISON: Radiographs May 2015.  MRI May 2019. FINDINGS: Status post cholecystectomy.  Atherosclerosis.  Iliac stent grafts.  Bones appear slightly demineralized.  No fracture or listhesis.  There is multilevel lower lumbar facet arthropathy.  There is disc space narrowing at L5-S1.  Multilevel marginal spurring noted with syndesmophyte formation along the anterior aspect of L2/L3. no instability demonstrated with flexion/extension maneuvers.     As above Electronically signed by: Ronaldo Sawyer MD Date:    09/24/2020 Time:    22:07    X-ray Hip 2 Or 3 Views Left    Result Date: 9/24/2020  EXAMINATION: XR " HIP 2 VIEW LEFT CLINICAL HISTORY: Pain in left hip TECHNIQUE: AP view of the pelvis and frog leg lateral view of the left hip were performed. COMPARISON: 05/06/2015. FINDINGS: Degenerative changes of the hips noted.  No evidence of AVN.  No acute fracture or dislocation.  Calcification at the right ischial tuberosity and bilateral greater trochanters which can be seen with tendinopathy.  Degenerative changes of the lumbosacral spine and sacroiliac joints and pubic symphysis noted as well.  Iliac stents visualized.     As above Electronically signed by: Ronaldo Sawyer MD Date:    09/24/2020 Time:    15:28    Mammo Digital Screening Bilat W/ Surya    Result Date: 10/8/2020  Result: Mammo Digital Screening Bilat w/ Surya  History: Patient is 44 y.o. and is seen for a screening mammogram. Films Compared: Prior images (if available) were compared.  Findings: This procedure was performed using tomosynthesis. Computer-aided detection was utilized in the interpretation of this examination. The breasts have scattered areas of fibroglandular density. There is no evidence of suspicious masses, calcifications, or other abnormal findings.     Bilateral There is no mammographic evidence of malignancy. BI-RADS Category: Overall: 2 - Benign  Recommendation: Routine screening mammogram in 1 year is recommended. Your estimated lifetime risk of breast cancer (to age 85) based on Tyrer-Cuzick risk assessment model is Tyrer-Cuzick: 9.1 %. According to the American Cancer Society, patients with a lifetime breast cancer risk of 20% or higher might benefit from supplemental screening tests.         Assessment:  Irritable bowel syndrome with diarrhea    Other orders  -     diphenoxylate-atropine 2.5-0.025 mg (LOMOTIL) 2.5-0.025 mg per tablet; Take 1 tablet by mouth 4 (four) times daily as needed for Diarrhea.  Dispense: 120 tablet; Refill: 0         Recommendations:  - add metamucil once daily  - do trial of lomotil       Follow up if symptoms  worsen or fail to improve.    Order summary:  No orders of the defined types were placed in this encounter.      Thank you so much for allowing me to participate in the care of Jenifer Lunsford MD     5

## 2022-08-11 NOTE — ED PROVIDER NOTE - ATTENDING CONTRIBUTION TO CARE
I personally evaluated patient. I agree with the findings and plan with all documentation on chart except as documented  in my note.    51-year-old female past medical history of seizure disorder presents to the emergency department with left shoulder, trapezius pain, and upper scapular pain.  Patient has chronic shoulder pain and follows with Dr. Otis Victoria and is scheduled for surgery next month.  Patient had an outpatient MRI that showed a SLAP tear to her shoulder and frozen shoulder.  Patient was taking medications and oxycodone at home but this was not relieving pain.  No fever or signs of infectious etiology.  No new injuries.  On exam, vital signs reviewed.  Patient is nontoxic-appearing.  Patient neurovascular intact but limited range of motion of shoulder due to pain.  X-rays taken and were negative.  Patient does not have any Percocet.  Will give anti-inflammatory and muscle relaxant with a short course of Percocet.    Patient is a good candidate to attempt outpatient management. Supportive care and home care discussed in detail. Patient aware they may have to return for re-evaluation and possible admission if outpatient treatment fails. Strict return precautions discussed.    Full DC instructions discussed and patient knows when to seek immediate medical attention.  Patient has proper follow up.  All results discussed and patient aware they may require further work up.  Proper follow up ensured. Limitations of ED work up discussed.  Medications administered and prescribed/OTC home meds discussed.  All questions and concerns from patient or family addressed. Understanding of instructions verbalized.

## 2022-08-11 NOTE — ED PROVIDER NOTE - PHYSICAL EXAMINATION
CONSTITUTIONAL: in NAD.  SKIN: warm, dry. no rashes.  HEAD: Normocephalic; atraumatic.  EYES: PERRLA, EOMI, no conjunctival erythema.  ENT: No nasal discharge; airway clear. mmm.  NECK: Supple; non tender.  EXT: (+) ttp over the L shoulder and L scapula. Active ROM of L shoulder limited 2/2 pain. B/l UE are NVI, normal sensation.  NEURO: Alert, oriented, grossly unremarkable. no focal neuro. deficits. ambulating with a steady gait.  PSYCH: Cooperative, appropriate.

## 2022-08-11 NOTE — ED PROVIDER NOTE - CARE PROVIDER_API CALL
Shorty Barnett)  [facility]  [address] [place] [address] [place] [address]  Phone: [phone]  Fax: [phone]  Follow Up Time:

## 2022-08-12 DIAGNOSIS — K59.03 DRUG INDUCED CONSTIPATION: ICD-10-CM

## 2022-08-12 DIAGNOSIS — T40.2X5A DRUG INDUCED CONSTIPATION: ICD-10-CM

## 2022-08-20 NOTE — PHYSICAL EXAM
[Left] : left shoulder [Sitting] : sitting [Moderate] : moderate [] : no ecchymosis [de-identified] :  there has decent strength [TWNoteComboBox7] : active forward flexion 150 degrees [TWNoteComboBox4] : passive forward flexion 160 degrees [de-identified] : active abduction 90 degrees [TWNoteComboBox9] : passive abduction 140 degrees [TWNoteComboBox6] : internal rotation L4 [de-identified] : external rotation 90 degrees

## 2022-08-20 NOTE — DISCUSSION/SUMMARY
[de-identified] :  Patient will continue physical therapy as directed.  A new script was provided for her so she may continue that.\par \par The patient was advised to rest/ice the area and can alternate with warm compresses.  Instructed not to do any strenuous activity that would further aggravate their symptoms.\par \par With the patient's approval, and under sterile technique, I performed a steroid injection today.  See the attached procedure note for further details.  The patient was informed that their next cortisone injection could not be until a minimum of three months from today's date and the patient understands.  Explained to the patient that the full effect of the injection will take 3-5 days to kick in.\par \par Patient will follow-up in 4-6 weeks for further evaluation.  All of the patient's questions/concerns were answered in detail.  \par \par Patient was seen under the supervision of Dr. Barnett.\par

## 2022-08-20 NOTE — ASSESSMENT
[FreeTextEntry1] :  left shoulder MRI reviewed with the patient in detail.  It revealed a SLAP tear and adhesive capsulitis of the left shoulder

## 2022-08-20 NOTE — HISTORY OF PRESENT ILLNESS
[de-identified] :  Patient is a 51-year-old female who reports office for subsequent re-evaluation of her left shoulder pain.  She had an MRI done and would like to go over the results with that.  Range of motion and palpating certain areas of the shoulder aggravate the patient's pain.  She has been taking the prescribed medication and done physical therapy which has given her minimal relief.  Denies any numbness or tingling.

## 2022-08-22 ENCOUNTER — RX RENEWAL (OUTPATIENT)
Age: 52
End: 2022-08-22

## 2022-08-29 ENCOUNTER — OUTPATIENT (OUTPATIENT)
Dept: OUTPATIENT SERVICES | Facility: HOSPITAL | Age: 52
LOS: 1 days | Discharge: HOME | End: 2022-08-29

## 2022-08-29 VITALS
SYSTOLIC BLOOD PRESSURE: 120 MMHG | DIASTOLIC BLOOD PRESSURE: 79 MMHG | HEIGHT: 65 IN | WEIGHT: 101.19 LBS | HEART RATE: 86 BPM | TEMPERATURE: 98 F | OXYGEN SATURATION: 99 % | RESPIRATION RATE: 15 BRPM

## 2022-08-29 DIAGNOSIS — Z90.710 ACQUIRED ABSENCE OF BOTH CERVIX AND UTERUS: Chronic | ICD-10-CM

## 2022-08-29 DIAGNOSIS — Z01.818 ENCOUNTER FOR OTHER PREPROCEDURAL EXAMINATION: ICD-10-CM

## 2022-08-29 DIAGNOSIS — D36.10 BENIGN NEOPLASM OF PERIPHERAL NERVES AND AUTONOMIC NERVOUS SYSTEM, UNSPECIFIED: Chronic | ICD-10-CM

## 2022-08-29 DIAGNOSIS — M75.102 UNSPECIFIED ROTATOR CUFF TEAR OR RUPTURE OF LEFT SHOULDER, NOT SPECIFIED AS TRAUMATIC: ICD-10-CM

## 2022-08-29 LAB
ALBUMIN SERPL ELPH-MCNC: 5.3 G/DL — HIGH (ref 3.5–5.2)
ALP SERPL-CCNC: 69 U/L — SIGNIFICANT CHANGE UP (ref 30–115)
ALT FLD-CCNC: 15 U/L — SIGNIFICANT CHANGE UP (ref 0–41)
ANION GAP SERPL CALC-SCNC: 13 MMOL/L — SIGNIFICANT CHANGE UP (ref 7–14)
APTT BLD: 28.9 SEC — SIGNIFICANT CHANGE UP (ref 27–39.2)
AST SERPL-CCNC: 18 U/L — SIGNIFICANT CHANGE UP (ref 0–41)
BASOPHILS # BLD AUTO: 0.03 K/UL — SIGNIFICANT CHANGE UP (ref 0–0.2)
BASOPHILS NFR BLD AUTO: 0.8 % — SIGNIFICANT CHANGE UP (ref 0–1)
BILIRUB SERPL-MCNC: <0.2 MG/DL — SIGNIFICANT CHANGE UP (ref 0.2–1.2)
BUN SERPL-MCNC: 15 MG/DL — SIGNIFICANT CHANGE UP (ref 10–20)
CALCIUM SERPL-MCNC: 10.4 MG/DL — HIGH (ref 8.5–10.1)
CHLORIDE SERPL-SCNC: 105 MMOL/L — SIGNIFICANT CHANGE UP (ref 98–110)
CO2 SERPL-SCNC: 22 MMOL/L — SIGNIFICANT CHANGE UP (ref 17–32)
CREAT SERPL-MCNC: 0.8 MG/DL — SIGNIFICANT CHANGE UP (ref 0.7–1.5)
EGFR: 88 ML/MIN/1.73M2 — SIGNIFICANT CHANGE UP
EOSINOPHIL # BLD AUTO: 0.04 K/UL — SIGNIFICANT CHANGE UP (ref 0–0.7)
EOSINOPHIL NFR BLD AUTO: 1 % — SIGNIFICANT CHANGE UP (ref 0–8)
GLUCOSE SERPL-MCNC: 83 MG/DL — SIGNIFICANT CHANGE UP (ref 70–99)
HCT VFR BLD CALC: 45.1 % — SIGNIFICANT CHANGE UP (ref 37–47)
HGB BLD-MCNC: 15.4 G/DL — SIGNIFICANT CHANGE UP (ref 12–16)
IMM GRANULOCYTES NFR BLD AUTO: 0.3 % — SIGNIFICANT CHANGE UP (ref 0.1–0.3)
INR BLD: 0.98 RATIO — SIGNIFICANT CHANGE UP (ref 0.65–1.3)
LYMPHOCYTES # BLD AUTO: 1.6 K/UL — SIGNIFICANT CHANGE UP (ref 1.2–3.4)
LYMPHOCYTES # BLD AUTO: 40.7 % — SIGNIFICANT CHANGE UP (ref 20.5–51.1)
MCHC RBC-ENTMCNC: 34.1 G/DL — SIGNIFICANT CHANGE UP (ref 32–37)
MCHC RBC-ENTMCNC: 34.1 PG — HIGH (ref 27–31)
MCV RBC AUTO: 100 FL — HIGH (ref 81–99)
MONOCYTES # BLD AUTO: 0.28 K/UL — SIGNIFICANT CHANGE UP (ref 0.1–0.6)
MONOCYTES NFR BLD AUTO: 7.1 % — SIGNIFICANT CHANGE UP (ref 1.7–9.3)
NEUTROPHILS # BLD AUTO: 1.97 K/UL — SIGNIFICANT CHANGE UP (ref 1.4–6.5)
NEUTROPHILS NFR BLD AUTO: 50.1 % — SIGNIFICANT CHANGE UP (ref 42.2–75.2)
NRBC # BLD: 0 /100 WBCS — SIGNIFICANT CHANGE UP (ref 0–0)
PLATELET # BLD AUTO: 310 K/UL — SIGNIFICANT CHANGE UP (ref 130–400)
POTASSIUM SERPL-MCNC: 4.8 MMOL/L — SIGNIFICANT CHANGE UP (ref 3.5–5)
POTASSIUM SERPL-SCNC: 4.8 MMOL/L — SIGNIFICANT CHANGE UP (ref 3.5–5)
PROT SERPL-MCNC: 7.5 G/DL — SIGNIFICANT CHANGE UP (ref 6–8)
PROTHROM AB SERPL-ACNC: 11.3 SEC — SIGNIFICANT CHANGE UP (ref 9.95–12.87)
RBC # BLD: 4.51 M/UL — SIGNIFICANT CHANGE UP (ref 4.2–5.4)
RBC # FLD: 10.8 % — LOW (ref 11.5–14.5)
SODIUM SERPL-SCNC: 140 MMOL/L — SIGNIFICANT CHANGE UP (ref 135–146)
WBC # BLD: 3.93 K/UL — LOW (ref 4.8–10.8)
WBC # FLD AUTO: 3.93 K/UL — LOW (ref 4.8–10.8)

## 2022-08-29 PROCEDURE — 93010 ELECTROCARDIOGRAM REPORT: CPT

## 2022-08-29 NOTE — H&P PST ADULT - REASON FOR ADMISSION
Proceduralist: Shorty Barnett  Procedure: LEFT SHOULDER ARTHROSCOPY DEBRIDEMENT LYSIS OF ADHESIONS  Procedure: 90 Minutes  PT POINTS TO HER LEFT SHOULDER STATING THIS IS WHERE I AM  HAVING SURGERY.  I HAVE BEEN HAVING PAIN IN MY LEFT SHOULDER FOR 3-4 MONTHS.   RECENTLY WITHIN THE LAST FEW WEEKS THE PAIN HAS INCREASED.  THE PAIN IS 10/10.  IT IS A PRESSURE, SHARP AND RADIATING PAIN.   THE PAIN RADIATES UP INTO MY NECK.   NOTHING TAKES THE PAIN AWAY.

## 2022-08-29 NOTE — H&P PST ADULT - NSICDXPASTMEDICALHX_GEN_ALL_CORE_FT
PAST MEDICAL HISTORY:  Epilepsy LAST SEIZURES-1999- PT UNCERTAIN OF DATE --TOPRIMATE LEVELS DRAWN 1 MONTH- PT STATES MYLEVELS ARE DRAWN  [id number]    H/O Raynaud's syndrome [facility] -- FOR RAYNAUDS'S     PAST MEDICAL HISTORY:  Epilepsy LAST SEIZURES-1999- PT UNCERTAIN OF DATE --TOPRIMATE LEVELS DRAWN 1 MONTH- PT STATES MYLEVELS ARE DRAWN  [id number]    H/O fibromyalgia     [facility]    OA (osteoarthritis)      PAST MEDICAL HISTORY:  Body mass index (BMI) of 19 or less in adult 16.8%    Epilepsy LAST SEIZURES-1999- PT UNCERTAIN OF DATE --TOPRIMATE LEVELS DRAWN 1 MONTH- PT STATES MYLEVELS ARE DRAWN  [id number]    H/O fibromyalgia     H/O Raynaud's syndrome [facility] -- FOR RAYNAUDS'S    OA (osteoarthritis)

## 2022-08-29 NOTE — H&P PST ADULT - HISTORY OF PRESENT ILLNESS
PT PRESENTS TO PAST WITH NO SOB, CP, PALPITATIONS, DYSURIA, UTI OR URI AT PRESENT.   PT ABLE TO WALK UP 2-3 FLIGHTS OF STEPS WITH NO SOB.  AS PER THE PT, THIS IS HIS/HER COMPLETE MEDICAL AND SURGICAL HX, INCLUDING MEDICATIONS PRESCRIBED AND OVER THE COUNTER  pt denies any covid s/s,    5/2022 tested positive in the past--PT IS AWARE OF DATE AND TIME OF COVID TESTING PRIOR TO SURGERY.  pt advised self quarantine till day of procedure  denies travel outside the [place] in the past 30 days  Anesthesia Alert  NO--Difficult Airway  NO--History of neck surgery or radiation  NO--Limited ROM of neck  NO--History of Malignant hyperthermia  NO--Personal or family history of Pseudocholinesterase deficiency  NO--Prior Anesthesia Complication  YES --Latex Allergy  NO--Loose teeth  NO--History of Rheumatoid Arthritis  NO--FLY  NO BLEEDING RISK  NO--Other_____

## 2022-09-08 ENCOUNTER — APPOINTMENT (OUTPATIENT)
Dept: CARDIOLOGY | Facility: CLINIC | Age: 52
End: 2022-09-08

## 2022-09-08 VITALS
TEMPERATURE: 97.7 F | BODY MASS INDEX: 16.66 KG/M2 | WEIGHT: 100 LBS | HEIGHT: 65 IN | HEART RATE: 68 BPM | DIASTOLIC BLOOD PRESSURE: 76 MMHG | SYSTOLIC BLOOD PRESSURE: 110 MMHG | OXYGEN SATURATION: 99 %

## 2022-09-08 DIAGNOSIS — Z71.82 EXERCISE COUNSELING: ICD-10-CM

## 2022-09-08 DIAGNOSIS — R63.6 UNDERWEIGHT: ICD-10-CM

## 2022-09-08 DIAGNOSIS — Z71.89 OTHER SPECIFIED COUNSELING: ICD-10-CM

## 2022-09-08 DIAGNOSIS — Z01.810 ENCOUNTER FOR PREPROCEDURAL CARDIOVASCULAR EXAMINATION: ICD-10-CM

## 2022-09-08 PROBLEM — M19.90 UNSPECIFIED OSTEOARTHRITIS, UNSPECIFIED SITE: Chronic | Status: ACTIVE | Noted: 2022-08-29

## 2022-09-08 PROBLEM — G40.909 EPILEPSY, UNSPECIFIED, NOT INTRACTABLE, WITHOUT STATUS EPILEPTICUS: Chronic | Status: ACTIVE | Noted: 2022-05-18

## 2022-09-08 PROBLEM — Z87.39 PERSONAL HISTORY OF OTHER DISEASES OF THE MUSCULOSKELETAL SYSTEM AND CONNECTIVE TISSUE: Chronic | Status: ACTIVE | Noted: 2022-08-29

## 2022-09-08 PROBLEM — Z86.79 PERSONAL HISTORY OF OTHER DISEASES OF THE CIRCULATORY SYSTEM: Chronic | Status: ACTIVE | Noted: 2022-05-18

## 2022-09-08 PROCEDURE — 93000 ELECTROCARDIOGRAM COMPLETE: CPT | Mod: NC

## 2022-09-08 PROCEDURE — 99213 OFFICE O/P EST LOW 20 MIN: CPT | Mod: 25

## 2022-09-08 RX ORDER — NALOXEGOL OXALATE 25 MG/1
25 TABLET, FILM COATED ORAL EVERY MORNING
Qty: 30 | Refills: 1 | Status: DISCONTINUED | COMMUNITY
Start: 2022-08-12 | End: 2022-09-08

## 2022-09-08 RX ORDER — NAPROXEN 500 MG/1
500 TABLET ORAL
Qty: 60 | Refills: 0 | Status: DISCONTINUED | COMMUNITY
Start: 2022-06-11 | End: 2022-09-08

## 2022-09-08 RX ORDER — ESTRADIOL 10 UG/1
10 TABLET VAGINAL
Qty: 18 | Refills: 0 | Status: DISCONTINUED | COMMUNITY
Start: 2022-01-23 | End: 2022-09-08

## 2022-09-08 RX ORDER — NITROFURANTOIN (MONOHYDRATE/MACROCRYSTALS) 25; 75 MG/1; MG/1
100 CAPSULE ORAL
Qty: 14 | Refills: 0 | Status: DISCONTINUED | COMMUNITY
Start: 2022-03-29 | End: 2022-09-08

## 2022-09-08 RX ORDER — OXYCODONE AND ACETAMINOPHEN 5; 325 MG/1; MG/1
5-325 TABLET ORAL TWICE DAILY
Qty: 14 | Refills: 0 | Status: DISCONTINUED | COMMUNITY
Start: 2022-07-15 | End: 2022-09-08

## 2022-09-08 RX ORDER — NABUMETONE 750 MG/1
750 TABLET, FILM COATED ORAL TWICE DAILY
Qty: 60 | Refills: 0 | Status: DISCONTINUED | COMMUNITY
Start: 2022-06-28 | End: 2022-09-08

## 2022-09-08 RX ORDER — DICLOFENAC SODIUM 75 MG/1
75 TABLET, DELAYED RELEASE ORAL
Qty: 60 | Refills: 0 | Status: DISCONTINUED | COMMUNITY
Start: 2022-07-08 | End: 2022-09-08

## 2022-09-08 RX ORDER — CELECOXIB 200 MG/1
200 CAPSULE ORAL
Qty: 30 | Refills: 0 | Status: DISCONTINUED | COMMUNITY
Start: 2022-01-28 | End: 2022-09-08

## 2022-09-08 RX ORDER — PREDNISONE 5 MG/1
5 TABLET ORAL
Qty: 12 | Refills: 0 | Status: DISCONTINUED | COMMUNITY
Start: 2022-03-03 | End: 2022-09-08

## 2022-09-08 RX ORDER — TIZANIDINE 4 MG/1
4 TABLET ORAL
Qty: 30 | Refills: 1 | Status: DISCONTINUED | COMMUNITY
Start: 2022-06-28 | End: 2022-09-08

## 2022-09-08 RX ORDER — BACLOFEN 10 MG/1
10 TABLET ORAL
Qty: 90 | Refills: 0 | Status: DISCONTINUED | COMMUNITY
Start: 2022-04-25 | End: 2022-09-08

## 2022-09-08 RX ORDER — TOPIRAMATE 25 MG
CAPSULE, SPRINKLE ORAL
Refills: 0 | Status: DISCONTINUED | COMMUNITY
End: 2022-09-08

## 2022-09-08 RX ORDER — GABAPENTIN 100 MG/1
100 CAPSULE ORAL
Qty: 30 | Refills: 0 | Status: DISCONTINUED | COMMUNITY
Start: 2022-02-10 | End: 2022-09-08

## 2022-09-08 RX ORDER — OMEPRAZOLE 40 MG/1
40 CAPSULE, DELAYED RELEASE ORAL
Refills: 0 | Status: DISCONTINUED | COMMUNITY
End: 2022-09-08

## 2022-09-08 RX ORDER — DILTIAZEM HYDROCHLORIDE 120 MG/1
120 CAPSULE, EXTENDED RELEASE ORAL
Qty: 90 | Refills: 0 | Status: DISCONTINUED | COMMUNITY
Start: 2021-12-06 | End: 2022-09-08

## 2022-09-08 RX ORDER — ALPRAZOLAM 0.5 MG/1
0.5 TABLET ORAL
Refills: 0 | Status: DISCONTINUED | COMMUNITY
End: 2022-09-08

## 2022-09-08 RX ORDER — ALBUTEROL 90 MCG
90 AEROSOL (GRAM) INHALATION
Refills: 0 | Status: DISCONTINUED | COMMUNITY
End: 2022-09-08

## 2022-09-09 PROBLEM — Z01.810 PRE-OPERATIVE CARDIOVASCULAR EXAMINATION: Status: ACTIVE | Noted: 2022-09-09

## 2022-09-09 PROBLEM — Z71.89 COUNSELING ON HEALTH PROMOTION AND DISEASE PREVENTION: Status: ACTIVE | Noted: 2022-09-09

## 2022-09-09 PROBLEM — R63.6 UNDERWEIGHT ON EXAMINATION: Status: ACTIVE | Noted: 2022-09-09

## 2022-09-09 NOTE — REVIEW OF SYSTEMS
[Fever] : no fever [Chills] : no chills [SOB] : no shortness of breath [Dyspnea on exertion] : not dyspnea during exertion [Chest Discomfort] : no chest discomfort [Lower Ext Edema] : no extremity edema [Leg Claudication] : no intermittent leg claudication [Palpitations] : no palpitations [Orthopnea] : no orthopnea [PND] : no PND [Syncope] : no syncope [Cough] : no cough [Abdominal Pain] : no abdominal pain [Joint Pain] : joint pain [Dizziness] : no dizziness [Confusion] : no confusion was observed

## 2022-09-09 NOTE — CARDIOLOGY SUMMARY
[de-identified] : EKG 9/8/22 9/8/22 Normal sinus rhythm \par  [de-identified] : TTE 5/21/22 LVEF 55-60%, nl global LV sys function, nl LA size, nl RA size, mild MR, mild TR\par MRI 5/24/22 normal biV function and volume, no evidence of LGE\par CTA Cors 5/21/22 normal coronary arteries, total calcium score 0 [de-identified] : Exercise stress test 5/23/22: mas  bpm, exercised for 9 minutes, negative for chronotropic incompetence

## 2022-09-09 NOTE — ASSESSMENT
[FreeTextEntry1] : Assessment:\par #Palpitation - resolved \par #Sinus leidy - resolved\par - In setting of diltiazem use for Raynaud's\par #Former smoker\par #Hxo Raynaud's\par - Tolerating amlodipine, EKG with HR 69 bpm\par #DLD\par  5/19/22 , , HDL 45, , CRP <3\par #BMI 16\par #Pre-operative risk stratification\par - EKG 9/8/22 9/8/22 Normal sinus rhythm \par - TTE 5/21/22 LVEF 55-60%, nl global LV sys function, nl LA size, nl RA size, mild MR, mild TR\par - CTA Cors 5/21/22 normal coronary arteries, total calcium score 0\par - METs>4\par - No active chest pain, signs/symptoms of heart failure, unstable arrhythmia or evidence of severe valvulopathy\par - RCRI Class I Risk - 3.9% 30-day risk of death, MI or cardiac arrest\par - No additional cardiac testing required. Patient may be proceed with surgery. \par \par \par Plan:\par - No additional cardiac testing required. Patient may be proceed with surgery. \par - Counseled patient on diet and exercise\par - Referral to dietician for tips to increase caloric consumption in heart healthy way\par - Return to clinic in 1 year or sooner PRN\par

## 2022-09-09 NOTE — HISTORY OF PRESENT ILLNESS
[FreeTextEntry1] : 51F  with fibromyalgia, epilepsy, asthma, Raynaud's here for follow-up and pre-op evaluation.\par \par 22\par L shoulder surgery 22 with Dr. Barnett\par \par Hospitalized May 2022 for symptomatic bradycardia. Extensive cardiac workup negative. Diltiazem for Raynuad's was discontinued and bradycardia improved. \par \par She has chronic L shoulder pain and pain in her feet that she attributes to fibromyalgia. She was started on amlodipine 10 mg daily within last few months for Raynaud's. Denies palpitations, dizziness, pre-syncope/syncope. She is unsure if the amlodipine is helping her Raynaud's yet. \par \par Walking 2 miles 4 days per week without chest pain. \par \par No chest pain, shortness of breath, palpitations, lightheadedness/dizziness, pre-syncope/syncope, or lower extremity edema. \par \par \par 22\par Pfizer COVID vaccine #1 - palpitations and SOB  a few hours after. Now feels frequent palpitations, lasts a few seconds. No lightheadedness/dizziness, pre-syncope/syncope, or lower extremity edema.\par \par Gluten sensitive \par \par Tobacco use - quit 20 years prior\par \par Work: not currently working, disabled\par \par Exercise: walking, light cardio, light resistance training, yoga\par \par FMH\par Father - kidney failure, rheumatic\par No FMH of CAD or stroke \par

## 2022-09-09 NOTE — PHYSICAL EXAM
[Well Developed] : well developed [Well Nourished] : well nourished [No Acute Distress] : no acute distress [Normal Conjunctiva] : normal conjunctiva [No Carotid Bruit] : no carotid bruit [Normal S1, S2] : normal S1, S2 [No Murmur] : no murmur [No Rub] : no rub [No Gallop] : no gallop [Clear Lung Fields] : clear lung fields [Good Air Entry] : good air entry [No Respiratory Distress] : no respiratory distress  [Soft] : abdomen soft [Moves all extremities] : moves all extremities [No Focal Deficits] : no focal deficits [Normal Speech] : normal speech [No ulcers] : no ulcers [No edema] : no edema [de-identified] : Thin

## 2022-09-16 ENCOUNTER — APPOINTMENT (OUTPATIENT)
Dept: PAIN MANAGEMENT | Facility: CLINIC | Age: 52
End: 2022-09-16

## 2022-09-16 ENCOUNTER — LABORATORY RESULT (OUTPATIENT)
Age: 52
End: 2022-09-16

## 2022-09-16 NOTE — ASU PATIENT PROFILE, ADULT - FALL HARM RISK - UNIVERSAL INTERVENTIONS
Bed in lowest position, wheels locked, appropriate side rails in place/Call bell, personal items and telephone in reach/Instruct patient to call for assistance before getting out of bed or chair/Non-slip footwear when patient is out of bed/[place] to call system/Physically safe environment - no spills, clutter or unnecessary equipment/Purposeful Proactive Rounding/Room/bathroom lighting operational, light cord in reach

## 2022-09-16 NOTE — ASU PATIENT PROFILE, ADULT - NSICDXPASTMEDICALHX_GEN_ALL_CORE_FT
PAST MEDICAL HISTORY:  Body mass index (BMI) of 19 or less in adult 16.8%    Epilepsy LAST SEIZURES-1999- PT UNCERTAIN OF DATE --TOPRIMATE LEVELS DRAWN 1 MONTH- PT STATES MYLEVELS ARE DRAWN  [id number]    H/O fibromyalgia     H/O Raynaud's syndrome [facility] -- FOR RAYNAUDS'S    OA (osteoarthritis)

## 2022-09-19 ENCOUNTER — APPOINTMENT (OUTPATIENT)
Age: 52
End: 2022-09-19
Payer: MEDICARE

## 2022-09-19 ENCOUNTER — OUTPATIENT (OUTPATIENT)
Dept: OUTPATIENT SERVICES | Facility: HOSPITAL | Age: 52
LOS: 1 days | Discharge: HOME | End: 2022-09-19

## 2022-09-19 ENCOUNTER — TRANSCRIPTION ENCOUNTER (OUTPATIENT)
Age: 52
End: 2022-09-19

## 2022-09-19 VITALS
RESPIRATION RATE: 19 BRPM | OXYGEN SATURATION: 99 % | HEART RATE: 62 BPM | SYSTOLIC BLOOD PRESSURE: 117 MMHG | DIASTOLIC BLOOD PRESSURE: 62 MMHG

## 2022-09-19 VITALS
OXYGEN SATURATION: 100 % | HEART RATE: 83 BPM | TEMPERATURE: 98 F | DIASTOLIC BLOOD PRESSURE: 76 MMHG | WEIGHT: 101.19 LBS | HEIGHT: 65 IN | SYSTOLIC BLOOD PRESSURE: 122 MMHG | RESPIRATION RATE: 18 BRPM

## 2022-09-19 DIAGNOSIS — Z87.39 PERSONAL HISTORY OF OTHER DISEASES OF THE MUSCULOSKELETAL SYSTEM AND CONNECTIVE TISSUE: ICD-10-CM

## 2022-09-19 DIAGNOSIS — D36.10 BENIGN NEOPLASM OF PERIPHERAL NERVES AND AUTONOMIC NERVOUS SYSTEM, UNSPECIFIED: Chronic | ICD-10-CM

## 2022-09-19 DIAGNOSIS — Z90.710 ACQUIRED ABSENCE OF BOTH CERVIX AND UTERUS: Chronic | ICD-10-CM

## 2022-09-19 PROCEDURE — 23430 REPAIR BICEPS TENDON: CPT | Mod: LT

## 2022-09-19 PROCEDURE — 29826 SHO ARTHRS SRG DECOMPRESSION: CPT | Mod: LT

## 2022-09-19 PROCEDURE — 29823 SHO ARTHRS SRG XTNSV DBRDMT: CPT | Mod: LT

## 2022-09-19 PROCEDURE — 29827 SHO ARTHRS SRG RT8TR CUF RPR: CPT | Mod: LT

## 2022-09-19 RX ORDER — CYCLOBENZAPRINE HYDROCHLORIDE 10 MG/1
1 TABLET, FILM COATED ORAL
Qty: 0 | Refills: 0 | DISCHARGE

## 2022-09-19 RX ORDER — ALPRAZOLAM 0.25 MG
1 TABLET ORAL
Qty: 0 | Refills: 0 | DISCHARGE

## 2022-09-19 RX ORDER — TOPIRAMATE 25 MG
300 TABLET ORAL
Qty: 0 | Refills: 0 | DISCHARGE

## 2022-09-19 RX ORDER — ACETAMINOPHEN 500 MG
1000 TABLET ORAL ONCE
Refills: 0 | Status: DISCONTINUED | OUTPATIENT
Start: 2022-09-19 | End: 2022-10-03

## 2022-09-19 RX ORDER — SODIUM CHLORIDE 9 MG/ML
1000 INJECTION, SOLUTION INTRAVENOUS
Refills: 0 | Status: DISCONTINUED | OUTPATIENT
Start: 2022-09-19 | End: 2022-10-03

## 2022-09-19 RX ORDER — ONDANSETRON 8 MG/1
4 TABLET, FILM COATED ORAL ONCE
Refills: 0 | Status: DISCONTINUED | OUTPATIENT
Start: 2022-09-19 | End: 2022-10-03

## 2022-09-19 RX ORDER — IBUPROFEN 200 MG
1 TABLET ORAL
Qty: 0 | Refills: 0 | DISCHARGE

## 2022-09-19 RX ORDER — OXYCODONE HYDROCHLORIDE 5 MG/1
5 TABLET ORAL ONCE
Refills: 0 | Status: DISCONTINUED | OUTPATIENT
Start: 2022-09-19 | End: 2022-09-19

## 2022-09-19 RX ORDER — HYDROMORPHONE HYDROCHLORIDE 2 MG/ML
0.5 INJECTION INTRAMUSCULAR; INTRAVENOUS; SUBCUTANEOUS
Refills: 0 | Status: DISCONTINUED | OUTPATIENT
Start: 2022-09-19 | End: 2022-09-19

## 2022-09-19 RX ORDER — AMLODIPINE BESYLATE 2.5 MG/1
1 TABLET ORAL
Qty: 0 | Refills: 0 | DISCHARGE

## 2022-09-19 RX ADMIN — SODIUM CHLORIDE 100 MILLILITER(S): 9 INJECTION, SOLUTION INTRAVENOUS at 15:28

## 2022-09-19 NOTE — BRIEF OPERATIVE NOTE - NSICDXBRIEFPROCEDURE_GEN_ALL_CORE_FT
PROCEDURES:  Repair, rotator cuff, arthroscopic 19-Sep-2022 10:43:41  Shorty Barnett  Subacromial decompression 19-Sep-2022 10:43:46  Shorty Barnett  Arthroscopic debridement, shoulder, extensive 19-Sep-2022 10:44:09  Shorty Barnett  Shoulder adhesion release 19-Sep-2022 10:44:20  Shorty Barnett  Arthroscopic claviculectomy 19-Sep-2022 10:44:26  Shorty Barnett

## 2022-09-19 NOTE — ASU DISCHARGE PLAN (ADULT/PEDIATRIC) - ASU DC SPECIAL INSTRUCTIONSFT
Post Operative Instructions for Shoulder Surgery    Your Surgery Included  [x ] Rotator Cuff Repair			  [x ] Debridement				  [ ] Biceps Tenodesis/Tenotomy	  [x ] Distal Clavicle Resection		  [ ] SLAP Repair  [ ] Instability Repair  [x ] Lysis of Adhesions/Manipulation  [ ] Other:  	  Call our office ([phone]) immediately if you experience any of the following:  •	Excessive bleeding or pus like drainage at the incision site  •	Uncontrollable pain not relieved by pain medication  •	Excessive swelling or redness at the incision site  •	Fever above 101.5 degrees not controlled with Tylenol or Motrin  •	Shortness of Breath  •	Any foul odor or blistering from the surgery site    Pain Management: You were given one or more of the following medication prescriptions before leaving the hospital. Have the prescriptions filled at a pharmacy on your way home and follow the instructions on the bottles.   Regional Anesthesia Injections (Blocks): You may have been given a regional nerve block either before or after surgery. This may numb your shoulder for 24-36 hours    Diet: Eat a bland diet for the first day after surgery. Progress your diet as tolerated. Constipation may occur with Narcotic usage, contact our office if you are experiencing constipation.    Activity: After you arrive at home, spend most of the first 24 hours resting in bed, on the couch, or in a reclining chair. After the first 24 hours at home, slowly increase your activity level based on your symptoms.    Dressing Change: Remove the dressing on the 3rd day. It is normal for some blood to be seen on the dressings. It is also normal for you to see apparent bruising on the skin around your shoulder when you remove the dressing. If present, leave the steri-strip tape across the incisions. If you are concerned by the drainage or the appearance of your shoulder, please call one of the numbers listed below. Keep incisions covered with Band-Aids/bandages.    Showering: You may shower on the 5th day after surgery if the wound is dry and clean, but do not let the wound soak in water until sutures are removed. Do not submerge in any water until after your postoperative appointment in clinic.    Shoulder Sling: You may have been sent home with a sling / pillow attachment holding your arm away from your body. You may remove the sling when changing clothes or bathing. Make sure to wear the sling while sleeping unless instructed otherwise. You may remove for exercises.    Shoulder Exercises: You may do these exercises for 2-5 mins five times a day in order to help regain your range of motion.  [x ] Shoulder Shrugs: Shrug your shoulders up and down  [x ] Pendulums: Bend forward allowing your arm to hang down in front of you. Gently swing your arm side-to-side and front to back  [x ] Elbow range of motion: Straighten and bend your elbow  [x ] Scapula Retractions: Squeeze shoulder blades together while slightly pulling them down  [ ] Passive Abduction: Have family member lift your arm away from your body bringing your elbow to the level of your shoulder  [ ] Shoulder rotation: With your arm at your side, have a family member rotate your arm internally and externally  [ ] Pulley exercises: Put a towel over the top of a door and face the door, use your good arm to pull your arm up in front of you    Follow Up: As Scheduled

## 2022-09-19 NOTE — CHART NOTE - NSCHARTNOTEFT_GEN_A_CORE
PACU ANESTHESIA ADMISSION NOTE      Procedure: Repair, rotator cuff, arthroscopic    Subacromial decompression    Arthroscopic debridement, shoulder, extensive    Shoulder adhesion release    Arthroscopic claviculectomy      Post op diagnosis:  History of rotator cuff tear        ____  Intubated  TV:______       Rate: ______      FiO2: ______    _x___  Patent Airway    _x___  Full return of protective reflexes    _x___  Full recovery from anesthesia / back to baseline     Vitals:   T: 98          R:   16               BP:  114/60                Sat: 99%                  P: 76      Mental Status:  ____ Awake   _____ Alert   ___x__ Drowsy   _____ Sedated    Nausea/Vomiting:  __x__ NO  ______Yes,   See Post - Op Orders          Pain Scale (0-10):  _____    Treatment: ____ None    __x__ See Post - Op/PCA Orders    Post - Operative Fluids:   ____ Oral   __x__ See Post - Op Orders    Plan: Discharge:   _x___Home       _____Floor     _____Critical Care    _____  Other:_________________    Comments:

## 2022-09-19 NOTE — ASU DISCHARGE PLAN (ADULT/PEDIATRIC) - NS MD DC FALL RISK RISK
For information on Fall & Injury Prevention, visit: [email or web address] OR  [email or web address] OR  https://www.cdc.gov/steadi/patient.html
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
## 2022-09-22 DIAGNOSIS — M75.42 IMPINGEMENT SYNDROME OF LEFT SHOULDER: ICD-10-CM

## 2022-09-22 DIAGNOSIS — M75.102 UNSPECIFIED ROTATOR CUFF TEAR OR RUPTURE OF LEFT SHOULDER, NOT SPECIFIED AS TRAUMATIC: ICD-10-CM

## 2022-09-22 DIAGNOSIS — M75.02 ADHESIVE CAPSULITIS OF LEFT SHOULDER: ICD-10-CM

## 2022-09-22 DIAGNOSIS — F17.210 NICOTINE DEPENDENCE, CIGARETTES, UNCOMPLICATED: ICD-10-CM

## 2022-09-22 DIAGNOSIS — Z91.040 LATEX ALLERGY STATUS: ICD-10-CM
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
## 2022-09-22 DIAGNOSIS — S43.432A SUPERIOR GLENOID LABRUM LESION OF LEFT SHOULDER, INITIAL ENCOUNTER: ICD-10-CM
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
## 2022-09-22 DIAGNOSIS — M75.22 BICIPITAL TENDINITIS, LEFT SHOULDER: ICD-10-CM

## 2022-09-22 DIAGNOSIS — Z88.0 ALLERGY STATUS TO PENICILLIN: ICD-10-CM

## 2022-09-27 ENCOUNTER — APPOINTMENT (OUTPATIENT)
Dept: ORTHOPEDIC SURGERY | Facility: CLINIC | Age: 52
End: 2022-09-27

## 2022-09-27 PROCEDURE — 99024 POSTOP FOLLOW-UP VISIT: CPT

## 2022-09-28 NOTE — HISTORY OF PRESENT ILLNESS
[de-identified] : Pt is s/p left shoulder arthroscopy\par Doing well.\par Pain controlled\par \par NAD\par Left shoulder\par Incisions healed\par Mild diffuse TTP\par Compartments soft and NT\par ROM limited secondary to pain\par NVI\par \par s/p left shoulder arthroscopy\par went over the surgery in detail\par will start pt, protocol provided\par continue pain control as needed\par fu in 1 month\par all questions answered\par

## 2022-10-13 ENCOUNTER — APPOINTMENT (OUTPATIENT)
Dept: PAIN MANAGEMENT | Facility: CLINIC | Age: 52
End: 2022-10-13

## 2022-10-13 VITALS
HEIGHT: 65 IN | SYSTOLIC BLOOD PRESSURE: 116 MMHG | DIASTOLIC BLOOD PRESSURE: 81 MMHG | WEIGHT: 100 LBS | HEART RATE: 86 BPM | BODY MASS INDEX: 16.66 KG/M2

## 2022-10-13 PROCEDURE — 99214 OFFICE O/P EST MOD 30 MIN: CPT

## 2022-10-13 NOTE — REASON FOR VISIT
[Follow-Up Visit] : a follow-up pain management visit [FreeTextEntry2] : Patient presents to office with left shoulder pain .

## 2022-10-13 NOTE — DATA REVIEWED
[FreeTextEntry1] : MRI of the left shoulder dated 6/21/22 shows evidence of adhesive capsulitis. Tear of the superior labrum extending to involve the posterior superior labrum. Degenerative cahnge in the acromioclavicular joint. \par \par MRI Lumbar Spine 12/2020: MRI of the lumbar spine reportedly show right foraminal herniation and disc bulge at L2-L3 extending to the lateral recess and foramina abutting the exiting right L2 nerve root. There is also posterior disc herniation with left foraminal annular tear L4-L5 and disc bulge at L3-L4.\par \par EMG of the lower extremities 3/18/2019 revealed L4-L5 lumbar radiculopathy. EMG of the upper extremities revealed C5-C6 cervical radiculopathy. \par \par SOAPP: low on 7/15/22\par Patient has combination of a low risk SOAP and no risk factors. UDS would be repeated randomly every quarter.\par \par UDS: 7/15/22 negative\par \par [place] REGISTRY: Consistent .\par \par Medications that trigger a UDS: Benzodiazepines (Ativan, Xanax, Valium) etc, Barbiturates, Narcotics (Avinza, Butrans, hydrocodone, Codeine, Lexi, Methadone, Morphine, MS Contin, Opana, oxycodone, Oxycontin, Suboxone etc), Pregabalin (Lyrica), Tramadol (Ultacet, Utram etc), Tapentadol, (Nucynta) and Elist Drugs (cocaine, THC, Etc.)\par \par Risk factors: Bipolar Illness, positive for any an illicit drugs, history of any ETOH and drug abuse, any signs of diversion, Sharing Meds, selling meds. Non consistent [place] drug reporting and above 120meq of morphine.

## 2022-10-13 NOTE — ASSESSMENT
[FreeTextEntry1] : Patient is a 51 year old woman who presents today with chief complaint of left shoulder pain secondary to labrum tear and adhesive capsulitis.  Following a left shoulder arthroscopy, her pain has increased and become severe. She continues to trial PT and home exercise with little relief. We will increase her Percocet from 5-325mg BID to 7.5-325mg BID.  We will follow up in 4 weeks.\par \par All of this patient's questions were answered and the conversation was understood well.\par \par I explained the risk of addiction, tolerance and withdrawals. UDS will be done randomly and a drug agreement was signed.\par \par \par \par I, Sheri Neri, attest that this documentation has been prepared under the direction and in the presence of Provider Marlen Rivers MD.\par \par \par Thank you for allowing me to assist in the management of this patient. \par \par \par Best Regards, \par \par \par Marlen Rivers M.D., FAAPMR\par \par \par Diplomate, American Board of Physical Medicine and Rehabilitation\par Diplomate, American Board of Pain Medicine \par Diplomate, American Board of Pain Management\par \par

## 2022-10-13 NOTE — PHYSICAL EXAM
[Normal Coordination] : normal coordination [Normal DTR UE/LE] : normal DTR UE/LE  [Normal Sensation] : normal sensation [Normal Mood and Affect] : normal mood and affect [Orientated] : orientated [Able to Communicate] : able to communicate [Normal Skin] : normal skin [No Rash] : no rash [No Ulcers] : no ulcers [No Lesions] : no lesions [No obvious lymphadenopathy in areas examined] : no obvious lymphadenopathy in areas examined [Well Developed] : well developed [Well Nourished] : well nourished [Left] : left shoulder [FreeTextEntry3] : Palpation of the thoracic/lumbar spine is as follows: bilateral SI joint tenderness. Range of motion of the thoracic and lumbar spine is as follows: Diminished range of motion in all planes. pain with bending to the right, stiffness with bending to the right, pain with bending to the left and stiffness with bending to the left. Special testing of the thoracic and lumbar spine is as follows: Alireza testing is positive for reproduction of pain into the groin bilaterally. Alireza testing is positive for reproduction of pain at the PSIS, and there is a postive Alberto Finger test and a positive Gaenslen's test bilaterally. Gait and function is as follows: patient ambulates without assistive device and mildly antalgic gait.  [] : no scapular winging [TWNoteComboBox7] : active forward flexion 80 degrees [TWNoteComboBox4] : passive forward flexion 90 degrees [de-identified] : active abduction 45 degrees
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
## 2022-10-14 ENCOUNTER — APPOINTMENT (OUTPATIENT)
Dept: ORTHOPEDIC SURGERY | Facility: CLINIC | Age: 52
End: 2022-10-14

## 2022-10-18 ENCOUNTER — APPOINTMENT (OUTPATIENT)
Dept: NUTRITION | Facility: CLINIC | Age: 52
End: 2022-10-18

## 2022-10-27 ENCOUNTER — APPOINTMENT (OUTPATIENT)
Dept: PAIN MANAGEMENT | Facility: CLINIC | Age: 52
End: 2022-10-27

## 2022-10-27 VITALS — HEIGHT: 65 IN | WEIGHT: 100 LBS | BODY MASS INDEX: 16.66 KG/M2

## 2022-10-27 PROCEDURE — 99214 OFFICE O/P EST MOD 30 MIN: CPT

## 2022-10-27 NOTE — ASSESSMENT
[FreeTextEntry1] : Patient is a 51 year old woman who presents today with chief complaint of left shoulder pain secondary to labrum tear and adhesive capsulitis.  Following a left shoulder arthroscopy, her pain has increased and become severe. She continues to trial PT and home exercise with little relief. Since the previous strengh increase provided her with no relief, we will increase her Percocet from 7-325mg BID to 10-325mg BID. Patient will follow up in 6 weeks.\par \par All of this patient's questions were answered and the conversation was understood well.\par \par I explained the risk of addiction, tolerance and withdrawals. UDS will be done randomly and a drug agreement was signed.\par \par I, Sheri Neri, attest that this documentation has been prepared under the direction and in the presence of Provider Marlen Rivers MD.\par \par \par Thank you for allowing me to assist in the management of this patient. \par \par \par Best Regards, \par \par \par Marlen Rivers M.D., [facility]\par \par \par Diplomate, American Board of Physical Medicine and Rehabilitation\par Diplomate, American Board of Pain Medicine \par Diplomate, American Board of Pain Management\par \par

## 2022-10-27 NOTE — PHYSICAL EXAM
[Normal Coordination] : normal coordination [Normal DTR UE/LE] : normal DTR UE/LE  [Normal Sensation] : normal sensation [Normal Mood and Affect] : normal mood and affect [Orientated] : orientated [Able to Communicate] : able to communicate [Normal Skin] : normal skin [No Rash] : no rash [No Ulcers] : no ulcers [No Lesions] : no lesions [No obvious lymphadenopathy in areas examined] : no obvious lymphadenopathy in areas examined [Well Developed] : well developed [Well Nourished] : well nourished [Left] : left shoulder [FreeTextEntry3] : Palpation of the thoracic/lumbar spine is as follows: bilateral SI joint tenderness. Range of motion of the thoracic and lumbar spine is as follows: Diminished range of motion in all planes. pain with bending to the right, stiffness with bending to the right, pain with bending to the left and stiffness with bending to the left. Special testing of the thoracic and lumbar spine is as follows: Alireza testing is positive for reproduction of pain into the groin bilaterally. Alireza testing is positive for reproduction of pain at the PSIS, and there is a postive Alberto Finger test and a positive Gaenslen's test bilaterally. Gait and function is as follows: patient ambulates without assistive device and mildly antalgic gait.  [] : no scapular winging [TWNoteComboBox7] : active forward flexion 80 degrees [TWNoteComboBox4] : passive forward flexion 90 degrees [de-identified] : active abduction 45 degrees
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
## 2022-11-11 ENCOUNTER — APPOINTMENT (OUTPATIENT)
Dept: ORTHOPEDIC SURGERY | Facility: CLINIC | Age: 52
End: 2022-11-11

## 2022-11-11 PROCEDURE — 99024 POSTOP FOLLOW-UP VISIT: CPT

## 2022-11-11 PROCEDURE — 20611 DRAIN/INJ JOINT/BURSA W/US: CPT | Mod: 58,LT

## 2022-11-14 NOTE — HISTORY OF PRESENT ILLNESS
[de-identified] : Pt is s/p left shoulder arthroscopy\par Still having pain and blancas rom\par \par NAD\par Left shoulder\par Incisions healed\par Mild diffuse TTP\par Compartments soft and NT\par ff 90\par er 30\par IR PSIS\par NVI\par \par s/p left shoulder arthroscopy\par went over the surgery in detail\par due to sig pain, left shoulder injection\par cont cons tx\par aggressive pt and pain control\par fu in 6 weeks\par \par Large Joint Injection was performed because of pain/rom. Anesthesia: ethyl chloride sprayed topically.\par Dexamethasone 2 cc of 4mg.  \par Lidocaine: 2 cc of 1% \par Medication was injected in the left shoulder. Patient has tried OTC's including aspirin, Ibuprofen, Aleve etc or prescription NSAIDS, and/or exercises at home and/ or physical therapy without satisfactory response. After verbal consent using sterile preparation and technique. The risks, benefits, and alternatives to cortisone injection were explained in full to the patient. Risks outlined include but are not limited to infection, sepsis, bleeding, scarring, skin discoloration, temporary increase in pain, syncopal episode, failure to resolve symptoms, allergic reaction, symptom recurrence, and elevation of blood sugar in diabetics. Patient understood the risks. All questions were answered. After discussion of options, patient requested an injection. Oral informed consent was obtained and sterile prep was done of the injection site. Sterile technique was utilized for the procedure including the preparation of the solutions used for the injection. Patient tolerated the procedure well. Advised to ice the injection site this evening. Prep with alcohol locally to site. Sterile technique used. Diagnostic ultrasound was performed of the shoulder to confirm.\par

## 2022-11-29 ENCOUNTER — APPOINTMENT (OUTPATIENT)
Dept: NUTRITION | Facility: CLINIC | Age: 52
End: 2022-11-29

## 2022-12-07 ENCOUNTER — LABORATORY RESULT (OUTPATIENT)
Age: 52
End: 2022-12-07

## 2022-12-07 ENCOUNTER — APPOINTMENT (OUTPATIENT)
Dept: PAIN MANAGEMENT | Facility: CLINIC | Age: 52
End: 2022-12-07

## 2022-12-07 VITALS — HEIGHT: 65 IN | WEIGHT: 100 LBS | BODY MASS INDEX: 16.66 KG/M2

## 2022-12-07 PROCEDURE — 80305 DRUG TEST PRSMV DIR OPT OBS: CPT | Mod: QW

## 2022-12-08 LAB — PM METHAMPHETAMINE: NORMAL
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
## 2022-12-08 NOTE — DATA REVIEWED
[FreeTextEntry1] : MRI of the left shoulder dated 6/21/22 shows evidence of adhesive capsulitis. Tear of the superior labrum extending to involve the posterior superior labrum. Degenerative cahnge in the acromioclavicular joint. \par \par MRI Lumbar Spine 12/2020: MRI of the lumbar spine reportedly show right foraminal herniation and disc bulge at L2-L3 extending to the lateral recess and foramina abutting the exiting right L2 nerve root. There is also posterior disc herniation with left foraminal annular tear L4-L5 and disc bulge at L3-L4.\par \par EMG of the lower extremities 3/18/2019 revealed L4-L5 lumbar radiculopathy. EMG of the upper extremities revealed C5-C6 cervical radiculopathy. \par \par SOAPP: low on 7/15/22\par Patient has combination of a low risk SOAP and no risk factors. UDS would be repeated randomly every quarter.\par \par UDS: 12/07/22 negative. \par \par [place] REGISTRY: Consistent .\par \par Medications that trigger a UDS: Benzodiazepines (Ativan, Xanax, Valium) etc, Barbiturates, Narcotics (Avinza, Butrans, hydrocodone, Codeine, Lexi, Methadone, Morphine, MS Contin, Opana, oxycodone, Oxycontin, Suboxone etc), Pregabalin (Lyrica), Tramadol (Ultacet, Utram etc), Tapentadol, (Nucynta) and Elist Drugs (cocaine, THC, Etc.)\par \par Risk factors: Bipolar Illness, positive for any an illicit drugs, history of any ETOH and drug abuse, any signs of diversion, Sharing Meds, selling meds. Non consistent [place] drug reporting and above 120meq of morphine.

## 2022-12-08 NOTE — PHYSICAL EXAM
[Normal Coordination] : normal coordination [Normal DTR UE/LE] : normal DTR UE/LE  [Normal Sensation] : normal sensation [Normal Mood and Affect] : normal mood and affect [Orientated] : orientated [Able to Communicate] : able to communicate [Normal Skin] : normal skin [No Rash] : no rash [No Ulcers] : no ulcers [No Lesions] : no lesions [No obvious lymphadenopathy in areas examined] : no obvious lymphadenopathy in areas examined [Well Developed] : well developed [Well Nourished] : well nourished [Left] : left shoulder [FreeTextEntry3] : Palpation of the thoracic/lumbar spine is as follows: bilateral SI joint tenderness. Range of motion of the thoracic and lumbar spine is as follows: Diminished range of motion in all planes. pain with bending to the right, stiffness with bending to the right, pain with bending to the left and stiffness with bending to the left. Special testing of the thoracic and lumbar spine is as follows: Alireza testing is positive for reproduction of pain into the groin bilaterally. Alireza testing is positive for reproduction of pain at the PSIS, and there is a postive Alberto Finger test and a positive Gaenslen's test bilaterally. Gait and function is as follows: patient ambulates without assistive device and mildly antalgic gait.  [] : no scapular winging [TWNoteComboBox7] : active forward flexion 80 degrees [TWNoteComboBox4] : passive forward flexion 90 degrees [de-identified] : active abduction 45 degrees

## 2022-12-08 NOTE — ASSESSMENT
[FreeTextEntry1] : Patient is a 52 year old woman who presents today with chief complaint of left shoulder pain secondary to labrum tear and adhesive capsulitis.  Following a left shoulder arthroscopy, her pain has increased and become severe. She continues to trial PT and home exercise with little relief. Patient is requesting an increase in her medication and was made aware that we will not escalate the dosage. She will continue with Percocet 10-325mg BID p.r.n unchanged. A UDS was performed today. Patient will follow up in 6 weeks. All of this patient's questions were answered and the conversation was understood well.\par \par I explained the risk of addiction, tolerance and withdrawals. UDS will be done randomly and a drug agreement was signed.\par \par I, Sheri Neri, attest that this documentation has been prepared under the direction and in the presence of Provider Marlen Rivers MD.\par \par \par Thank you for allowing me to assist in the management of this patient. \par \par \par Best Regards, \par \par \par Marlen Rivers M.D., [facility]\par \par \par Diplomate, American Board of Physical Medicine and Rehabilitation\par Diplomate, American Board of Pain Medicine \par Diplomate, American Board of Pain Management\par \par

## 2022-12-27 ENCOUNTER — APPOINTMENT (OUTPATIENT)
Dept: ORTHOPEDIC SURGERY | Facility: CLINIC | Age: 52
End: 2022-12-27

## 2023-01-17 ENCOUNTER — APPOINTMENT (OUTPATIENT)
Dept: PAIN MANAGEMENT | Facility: CLINIC | Age: 53
End: 2023-01-17

## 2023-01-19 ENCOUNTER — APPOINTMENT (OUTPATIENT)
Dept: PAIN MANAGEMENT | Facility: CLINIC | Age: 53
End: 2023-01-19
Payer: MEDICARE

## 2023-01-19 VITALS
BODY MASS INDEX: 16.66 KG/M2 | SYSTOLIC BLOOD PRESSURE: 111 MMHG | HEART RATE: 65 BPM | DIASTOLIC BLOOD PRESSURE: 86 MMHG | HEIGHT: 65 IN | WEIGHT: 100 LBS

## 2023-01-19 PROCEDURE — 99213 OFFICE O/P EST LOW 20 MIN: CPT

## 2023-01-19 RX ORDER — OXYCODONE AND ACETAMINOPHEN 5; 325 MG/1; MG/1
5-325 TABLET ORAL
Qty: 20 | Refills: 0 | Status: DISCONTINUED | COMMUNITY
Start: 2022-09-27 | End: 2023-01-19

## 2023-01-19 RX ORDER — OXYCODONE AND ACETAMINOPHEN 7.5; 325 MG/1; MG/1
7.5-325 TABLET ORAL TWICE DAILY
Qty: 60 | Refills: 0 | Status: DISCONTINUED | COMMUNITY
Start: 2022-10-13 | End: 2023-01-19

## 2023-01-19 RX ORDER — OXYCODONE AND ACETAMINOPHEN 10; 325 MG/1; MG/1
10-325 TABLET ORAL TWICE DAILY
Qty: 60 | Refills: 0 | Status: DISCONTINUED | COMMUNITY
Start: 2022-10-27 | End: 2023-01-19

## 2023-01-20 NOTE — ASSESSMENT
[FreeTextEntry1] : Patient is a 52 year old woman who has chronic pain. She has been having significant pain and parasthesias s/p her left shoulder arthroscopy. She will be undergoing an EMG of the upper extremities by Dr. Angel in neurology. She plans to follow up with orthopedics afterwards. She will continue with Percocet 10-325mg BID p.r.n unchanged. I have prescribed her gabapentin 300 mg which she will titrate up to TID dosing. Patient will follow up in 4 weeks for reevaluation.\par

## 2023-01-20 NOTE — PHYSICAL EXAM
[Normal Coordination] : normal coordination [Normal DTR UE/LE] : normal DTR UE/LE  [Normal Sensation] : normal sensation [Normal Mood and Affect] : normal mood and affect [Orientated] : orientated [Able to Communicate] : able to communicate [Normal Skin] : normal skin [No Rash] : no rash [No Ulcers] : no ulcers [No Lesions] : no lesions [No obvious lymphadenopathy in areas examined] : no obvious lymphadenopathy in areas examined [Well Developed] : well developed [Well Nourished] : well nourished [Left] : left shoulder [FreeTextEntry3] : Palpation of the thoracic/lumbar spine is as follows: bilateral SI joint tenderness. Range of motion of the thoracic and lumbar spine is as follows: Diminished range of motion in all planes. pain with bending to the right, stiffness with bending to the right, pain with bending to the left and stiffness with bending to the left. Special testing of the thoracic and lumbar spine is as follows: Alireza testing is positive for reproduction of pain into the groin bilaterally. Alireza testing is positive for reproduction of pain at the PSIS, and there is a postive Alberto Finger test and a positive Gaenslen's test bilaterally. Gait and function is as follows: patient ambulates without assistive device and mildly antalgic gait.  [] : no scapular winging [TWNoteComboBox7] : active forward flexion 80 degrees [TWNoteComboBox4] : passive forward flexion 90 degrees [de-identified] : active abduction 45 degrees

## 2023-01-20 NOTE — DATA REVIEWED
[FreeTextEntry1] : MRI of the left shoulder dated 6/21/22 shows evidence of adhesive capsulitis. Tear of the superior labrum extending to involve the posterior superior labrum. Degenerative cahnge in the acromioclavicular joint. \par \par MRI Lumbar Spine 12/2020: MRI of the lumbar spine reportedly show right foraminal herniation and disc bulge at L2-L3 extending to the lateral recess and foramina abutting the exiting right L2 nerve root. There is also posterior disc herniation with left foraminal annular tear L4-L5 and disc bulge at L3-L4.\par \par EMG of the lower extremities 3/18/2019 revealed L4-L5 lumbar radiculopathy. EMG of the upper extremities revealed C5-C6 cervical radiculopathy. \par \par SOAPP: low on 7/15/22\par Patient has combination of a low risk SOAP and no risk factors. UDS would be repeated randomly every quarter.\par \par [place] REGISTRY: Consistent .\par \par Medications that trigger a UDS: Benzodiazepines (Ativan, Xanax, Valium) etc, Barbiturates, Narcotics (Avinza, Butrans, hydrocodone, Codeine, Lexi, Methadone, Morphine, MS Contin, Opana, oxycodone, Oxycontin, Suboxone etc), Pregabalin (Lyrica), Tramadol (Ultacet, Utram etc), Tapentadol, (Nucynta) and Elist Drugs (cocaine, THC, Etc.)\par \par Risk factors: Bipolar Illness, positive for any an illicit drugs, history of any ETOH and drug abuse, any signs of diversion, Sharing Meds, selling meds. Non consistent [place] drug reporting and above 120meq of morphine.
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
## 2023-01-20 NOTE — DISCUSSION/SUMMARY
[de-identified] : I have consulted the  registry for the purpose of reviewing the patient's controlled substance.\par \par Overall there is at least a 30-50% reduction in pain with the prescribed analgesics. The patient denies any adverse side effects due to the medication (sleeping disturbance, constipation, sleepiness, hallucinations and/or urination problems). \par \par There are no inconsistencies on urine toxicology screening which would necessitate an alteration of therapy.\par \par The patient will return to the office in 4 weeks time and is aware to contact me with any question or concerns in the interim.\par \par Joycelyn Ervin PA-C \par Marlen Rivers MD\par

## 2023-02-12 ENCOUNTER — LABORATORY RESULT (OUTPATIENT)
Age: 53
End: 2023-02-12

## 2023-02-13 ENCOUNTER — APPOINTMENT (OUTPATIENT)
Dept: PAIN MANAGEMENT | Facility: CLINIC | Age: 53
End: 2023-02-13
Payer: MEDICARE

## 2023-02-13 VITALS
SYSTOLIC BLOOD PRESSURE: 120 MMHG | WEIGHT: 100 LBS | HEART RATE: 78 BPM | BODY MASS INDEX: 16.66 KG/M2 | HEIGHT: 65 IN | DIASTOLIC BLOOD PRESSURE: 86 MMHG

## 2023-02-13 LAB
AMP / AMPHETAMINE: NEGATIVE
BAR / SECOBARBITAL: NEGATIVE
BUP / BUPRENORPHINE: NEGATIVE
BZO / OXAZEPAM: NEGATIVE
COC / COCAINE: NEGATIVE
CREATININE: NORMAL MG/DL
MDMA / METHYLENEDIOXYMETHAMPHETAMINE: NEGATIVE
MET / METHAMPHETAMINES: NEGATIVE
MOP / MORPHINE: NEGATIVE
MTD / METHADONE: NEGATIVE
OXY / OXYCODONE: NEGATIVE
PCP / PHENCYCLIDINE: NEGATIVE
PH: 5
SPECIFIC GRAVITY: 1
TEMPERATURE: 92 C
THC / MARIJUANA: NEGATIVE

## 2023-02-13 PROCEDURE — 80305 DRUG TEST PRSMV DIR OPT OBS: CPT | Mod: QW

## 2023-02-13 PROCEDURE — 99213 OFFICE O/P EST LOW 20 MIN: CPT

## 2023-02-13 RX ORDER — GABAPENTIN 300 MG/1
300 CAPSULE ORAL
Qty: 90 | Refills: 1 | Status: DISCONTINUED | COMMUNITY
Start: 2023-01-19 | End: 2023-02-13

## 2023-02-13 NOTE — DISCUSSION/SUMMARY
[Medication Risks Reviewed] : Medication risks reviewed [de-identified] : I have consulted the  registry for the purpose of reviewing the patient's controlled substance.\par \par Overall there is at least a 30-50% reduction in pain with the prescribed analgesics. The patient denies any adverse side effects due to the medication (sleeping disturbance, constipation, sleepiness, hallucinations and/or urination problems). \par \par There are no inconsistencies on urine toxicology screening which would necessitate an alteration of therapy.\par \par The patient will return to the office in 4 weeks time and is aware to contact me with any question or concerns in the interim.\par \par William Casey PA-C \par Marlen Rivers MD\par
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
## 2023-02-13 NOTE — PHYSICAL EXAM
[Normal Coordination] : normal coordination [Normal DTR UE/LE] : normal DTR UE/LE  [Normal Sensation] : normal sensation [Normal Mood and Affect] : normal mood and affect [Orientated] : orientated [Able to Communicate] : able to communicate [Normal Skin] : normal skin [No Rash] : no rash [No Ulcers] : no ulcers [No Lesions] : no lesions [No obvious lymphadenopathy in areas examined] : no obvious lymphadenopathy in areas examined [Well Developed] : well developed [Well Nourished] : well nourished [Flexion] : flexion [Extension] : extension [Left] : left shoulder [FreeTextEntry3] : Palpation of the thoracic/lumbar spine is as follows: bilateral SI joint tenderness. Range of motion of the thoracic and lumbar spine is as follows: Diminished range of motion in all planes. pain with bending to the right, stiffness with bending to the right, pain with bending to the left and stiffness with bending to the left. Special testing of the thoracic and lumbar spine is as follows: Alireza testing is positive for reproduction of pain into the groin bilaterally. Alireza testing is positive for reproduction of pain at the PSIS, and there is a postive Alberto Finger test and a positive Gaenslen's test bilaterally. Gait and function is as follows: patient ambulates without assistive device and mildly antalgic gait.  [] : no scapular winging [TWNoteComboBox7] : active forward flexion 80 degrees [TWNoteComboBox4] : passive forward flexion 90 degrees [de-identified] : active abduction 45 degrees

## 2023-02-13 NOTE — ASSESSMENT
[FreeTextEntry1] : Patient is a 52 year old woman who has chronic pain. She has been having significant pain and parasthesias s/p her left shoulder arthroscopy. She will be undergoing an EMG of the upper extremities by Dr. Angel in neurology. She plans to follow up with orthopedics afterwards. She will continue with Percocet 10-325mg BID unchanged.Patient will follow up in 4 weeks for reevaluation.\par  UDS will be repeated today.\par

## 2023-03-16 ENCOUNTER — APPOINTMENT (OUTPATIENT)
Dept: PAIN MANAGEMENT | Facility: CLINIC | Age: 53
End: 2023-03-16

## 2023-03-17 ENCOUNTER — APPOINTMENT (OUTPATIENT)
Dept: PAIN MANAGEMENT | Facility: CLINIC | Age: 53
End: 2023-03-17
Payer: MEDICARE

## 2023-03-17 VITALS — BODY MASS INDEX: 16.66 KG/M2 | WEIGHT: 100 LBS | HEIGHT: 65 IN

## 2023-03-17 VITALS — BODY MASS INDEX: 16.66 KG/M2 | HEIGHT: 65 IN | WEIGHT: 100 LBS

## 2023-03-17 PROCEDURE — 99213 OFFICE O/P EST LOW 20 MIN: CPT

## 2023-03-17 NOTE — DISCUSSION/SUMMARY
[Medication Risks Reviewed] : Medication risks reviewed [de-identified] : I have consulted the  registry for the purpose of reviewing the patient's controlled substance.\par \par Overall there is at least a 30-50% reduction in pain with the prescribed analgesics. The patient denies any adverse side effects due to the medication (sleeping disturbance, constipation, sleepiness, hallucinations and/or urination problems). \par \par There are no inconsistencies on urine toxicology screening which would necessitate an alteration of therapy.\par \par The patient will return to the office in 4 weeks time and is aware to contact me with any question or concerns in the interim.\par \par William Casey PA-C \par Marlen Rivers MD\par

## 2023-03-17 NOTE — ASSESSMENT
[FreeTextEntry1] : Patient is a 52 year old woman who has chronic pain. She has been having significant pain and parasthesias s/p her left shoulder arthroscopy. She will be undergoing an EMG of the upper extremities by Dr. Angel in neurology. She plans to follow up with orthopedics afterwards. She will continue with Percocet 10-325mg BID unchanged.Patient will follow up in 4 weeks for reevaluation.\par  UDS will be repeated on next visit\par

## 2023-03-17 NOTE — PHYSICAL EXAM
[Normal Coordination] : normal coordination [Normal DTR UE/LE] : normal DTR UE/LE  [Normal Sensation] : normal sensation [Normal Mood and Affect] : normal mood and affect [Orientated] : orientated [Able to Communicate] : able to communicate [Normal Skin] : normal skin [No Rash] : no rash [No Ulcers] : no ulcers [No Lesions] : no lesions [No obvious lymphadenopathy in areas examined] : no obvious lymphadenopathy in areas examined [Well Developed] : well developed [Well Nourished] : well nourished [Flexion] : flexion [Extension] : extension [Left] : left shoulder [FreeTextEntry3] : Palpation of the thoracic/lumbar spine is as follows: bilateral SI joint tenderness. Range of motion of the thoracic and lumbar spine is as follows: Diminished range of motion in all planes. pain with bending to the right, stiffness with bending to the right, pain with bending to the left and stiffness with bending to the left. Special testing of the thoracic and lumbar spine is as follows: Alireza testing is positive for reproduction of pain into the groin bilaterally. Alireza testing is positive for reproduction of pain at the PSIS, and there is a postive Alberto Finger test and a positive Gaenslen's test bilaterally. Gait and function is as follows: patient ambulates without assistive device and mildly antalgic gait.  [] : no scapular winging [TWNoteComboBox7] : active forward flexion 80 degrees [TWNoteComboBox4] : passive forward flexion 90 degrees [de-identified] : active abduction 45 degrees
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
## 2023-04-17 ENCOUNTER — RESULT CHARGE (OUTPATIENT)
Age: 53
End: 2023-04-17

## 2023-04-17 ENCOUNTER — LABORATORY RESULT (OUTPATIENT)
Age: 53
End: 2023-04-17

## 2023-04-17 ENCOUNTER — APPOINTMENT (OUTPATIENT)
Dept: PAIN MANAGEMENT | Facility: CLINIC | Age: 53
End: 2023-04-17
Payer: MEDICARE

## 2023-04-17 VITALS
HEART RATE: 83 BPM | BODY MASS INDEX: 16.66 KG/M2 | HEIGHT: 65 IN | SYSTOLIC BLOOD PRESSURE: 132 MMHG | DIASTOLIC BLOOD PRESSURE: 89 MMHG | WEIGHT: 100 LBS

## 2023-04-17 LAB
AMP / AMPHETAMINE: NEGATIVE
BAR / SECOBARBITAL: NEGATIVE
BUP / BUPRENORPHINE: NEGATIVE
BZO / OXAZEPAM: NEGATIVE
COC / COCAINE: NEGATIVE
CREATININE: 50 MG/DL
MDMA / METHYLENEDIOXYMETHAMPHETAMINE: NEGATIVE
MET / METHAMPHETAMINES: NEGATIVE
MOP / MORPHINE: NEGATIVE
MTD / METHADONE: NEGATIVE
OXY / OXYCODONE: POSITIVE
PCP / PHENCYCLIDINE: NEGATIVE
PH: 5
SPECIFIC GRAVITY: 1.01
TEMPERATURE: 92 C
THC / MARIJUANA: NEGATIVE

## 2023-04-17 PROCEDURE — 99213 OFFICE O/P EST LOW 20 MIN: CPT

## 2023-04-17 RX ORDER — TIZANIDINE HYDROCHLORIDE 4 MG/1
4 CAPSULE ORAL
Qty: 60 | Refills: 0 | Status: DISCONTINUED | COMMUNITY
Start: 2022-09-27 | End: 2023-04-17

## 2023-04-17 NOTE — DISCUSSION/SUMMARY
[Medication Risks Reviewed] : Medication risks reviewed [de-identified] : I have consulted the  registry for the purpose of reviewing the patient's controlled substance.\par \par Overall there is at least a 30-50% reduction in pain with the prescribed analgesics. The patient denies any adverse side effects due to the medication (sleeping disturbance, constipation, sleepiness, hallucinations and/or urination problems). \par \par UDS - will repeat today.\par \par The patient will return to the office in 4 weeks time and is aware to contact me with any question or concerns in the interim.\par \par William Casey PA-C \par Marlen Rivers MD\par

## 2023-04-17 NOTE — PHYSICAL EXAM
[Normal Coordination] : normal coordination [Normal DTR UE/LE] : normal DTR UE/LE  [Normal Sensation] : normal sensation [Normal Mood and Affect] : normal mood and affect [Orientated] : orientated [Able to Communicate] : able to communicate [Normal Skin] : normal skin [No Rash] : no rash [No Ulcers] : no ulcers [No Lesions] : no lesions [No obvious lymphadenopathy in areas examined] : no obvious lymphadenopathy in areas examined [Well Developed] : well developed [Well Nourished] : well nourished [Flexion] : flexion [Extension] : extension [Left] : left shoulder [FreeTextEntry3] : Palpation of the thoracic/lumbar spine is as follows: bilateral SI joint tenderness. Range of motion of the thoracic and lumbar spine is as follows: Diminished range of motion in all planes. pain with bending to the right, stiffness with bending to the right, pain with bending to the left and stiffness with bending to the left. Special testing of the thoracic and lumbar spine is as follows: Alireza testing is positive for reproduction of pain into the groin bilaterally. Alireza testing is positive for reproduction of pain at the PSIS, and there is a postive Alberto Finger test and a positive Gaenslen's test bilaterally. Gait and function is as follows: patient ambulates without assistive device and mildly antalgic gait.  [] : no scapular winging [TWNoteComboBox7] : active forward flexion 80 degrees [TWNoteComboBox4] : passive forward flexion 90 degrees [de-identified] : active abduction 45 degrees

## 2023-04-17 NOTE — ASSESSMENT
[FreeTextEntry1] : Patient is a 52 year old woman who has chronic pain. She has been having significant pain and parasthesias s/p her left shoulder arthroscopy.  She will continue with Percocet 10-325mg BID which provides at least 50% pain relief and allows her to perform ADLs with no problem.Patient will follow up in 4 weeks for reevaluation.\par  UDS will repeat today.
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
## 2023-04-25 LAB

## 2023-05-09 ENCOUNTER — APPOINTMENT (OUTPATIENT)
Dept: NEUROSURGERY | Facility: CLINIC | Age: 53
End: 2023-05-09
Payer: MEDICARE

## 2023-05-09 PROCEDURE — 99204 OFFICE O/P NEW MOD 45 MIN: CPT

## 2023-05-09 NOTE — DISCUSSION/SUMMARY
[de-identified] : Ms. Patterson presents today as a 52 year old woman with a chief complaint of left upper extremity pain and paresthesia. She has ongoing issues with her left shoulder. She underwent a left shoulder arthroscopy with Dr. Barnett about 6 months ago. She continues to have severe pain and left upper extremity paraesthesias following the surgery. Patient's symptoms possible related to RSD. We want her to undergo a stellate ganglion block which may help with her left hand questionable RSD. I am referring her back to pain management. In addition she also has cervical spondylitic findings which is likely causing isolated cervicalgia which she can consider MBB/RFA, possibly at C4-5 and C5-6. This is also why the triple phase bone scan is being completed as well which we are sending out for. We need to know the EMG UE results from Dr. Angel's office to see if her left cervical radiculopathy is unrelated to any plexus involvement. We will retrieve these results. \par \par Norberto Gonzalez MD\par \par Board Certified , Neurosurgeon\par \par
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
## 2023-05-09 NOTE — PHYSICAL EXAM
[de-identified] : Palpation: tenderness to palpation, tenderness at anterior shoulder, tenderness at lateral shoulder.  [de-identified] : MRI of the left shoulder dated 6/21/22 shows evidence of adhesive capsulitis. Tear of the superior labrum extending to involve the posterior superior labrum. Degenerative cahnge in the acromioclavicular joint. \par \par MRI Lumbar Spine 12/2020: MRI of the lumbar spine reportedly show right foraminal herniation and disc bulge at L2-L3 extending to the lateral recess and foramina abutting the exiting right L2 nerve root. There is also posterior disc herniation with left foraminal annular tear L4-L5 and disc bulge at L3-L4.\par \par EMG of the lower extremities 3/18/2019 revealed L4-L5 lumbar radiculopathy. EMG of the upper extremities revealed C5-C6 cervical radiculopathy.

## 2023-05-10 ENCOUNTER — APPOINTMENT (OUTPATIENT)
Dept: PAIN MANAGEMENT | Facility: CLINIC | Age: 53
End: 2023-05-10
Payer: MEDICARE

## 2023-05-10 VITALS
HEART RATE: 87 BPM | BODY MASS INDEX: 16.66 KG/M2 | DIASTOLIC BLOOD PRESSURE: 89 MMHG | HEIGHT: 65 IN | WEIGHT: 100 LBS | SYSTOLIC BLOOD PRESSURE: 132 MMHG

## 2023-05-10 PROCEDURE — 99214 OFFICE O/P EST MOD 30 MIN: CPT

## 2023-05-12 NOTE — PHYSICAL EXAM
[Normal Coordination] : normal coordination [Normal DTR UE/LE] : normal DTR UE/LE  [Normal Sensation] : normal sensation [Normal Mood and Affect] : normal mood and affect [Orientated] : orientated [Able to Communicate] : able to communicate [Normal Skin] : normal skin [No Rash] : no rash [No Ulcers] : no ulcers [No Lesions] : no lesions [No obvious lymphadenopathy in areas examined] : no obvious lymphadenopathy in areas examined [Well Developed] : well developed [Well Nourished] : well nourished [Flexion] : flexion [Extension] : extension [Left] : left shoulder [] : no scapular winging [TWNoteComboBox7] : active forward flexion 80 degrees [TWNoteComboBox4] : passive forward flexion 90 degrees [de-identified] : active abduction 45 degrees [FreeTextEntry3] : Skin appears shiny, hand reddish in color, hand cooler than right hand [de-identified] : Allodynia, hypersensitivity of left hand

## 2023-05-12 NOTE — ASSESSMENT
[FreeTextEntry1] : This is a 52 year old woman who has chronic pain. She has been having significant pain and parasthesias s/p her left shoulder arthroscopy. She experiences sudomotor and vasomotor in the left hand.Pain is likely related to Complex Regional Pain Syndrome. We will trial a LT stellate ganglion block w/ mac and she will follow up afterwards. She will continue with Percocet 10-325mg BID which provides at least 50% pain relief and allows her to perform ADLs with no problem.\par \par ANESTHESIA PARAGRAPH: The patient has severe anxiety of procedures that necessitates monitored anesthesia care (MAC). The procedure performed will be close to major nerves, arteries, and spinal cord and/or joint structures. Due to the proximity of these structures, we need the patient to be still during the procedure. With the help of MAC, this will be safely achieved and decrease the risk of any complications.\par \par RISK AND BENEFIT PARAGRAPH: Risk, benefits, pros and cons of procedure were explained to the patient using models and diagrams and their questions were answered.\par \par \par I, Sheri Neri, attest that this documentation has been prepared under the direction and in the presence of Provider Marlen Rivers MD.\par \par \par Thank you for allowing me to assist in the management of this patient. \par \par \par Best Regards, \par \par \par Marlen Rivers M.D., [facility]\par \par \par Diplomate, American Board of Physical Medicine and Rehabilitation\par Diplomate, American Board of Pain Medicine \par Diplomate, American Board of Pain Management\par
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
## 2023-05-12 NOTE — DATA REVIEWED
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 Superimposed multilevel cervical radiculopathy, brachial plexopathy can not be ruled out.  \par \par MRI of the left shoulder dated 6/21/22 shows evidence of adhesive capsulitis. Tear of the superior labrum extending to involve the posterior superior labrum. Degenerative cahnge in the acromioclavicular joint. \par \par MRI Lumbar Spine 12/2020: MRI of the lumbar spine reportedly show right foraminal herniation and disc bulge at L2-L3 extending to the lateral recess and foramina abutting the exiting right L2 nerve root. There is also posterior disc herniation with left foraminal annular tear L4-L5 and disc bulge at L3-L4.\par \par \par EMG of the lower extremities 3/18/2019 revealed L4-L5 lumbar radiculopathy. EMG of the upper extremities revealed C5-C6 cervical radiculopathy. \par \par SOAPP: low on 7/15/22\par Patient has combination of a low risk SOAP and no risk factors. UDS would be repeated randomly every quarter.\par \par [place] REGISTRY: Consistent .\par \par Medications that trigger a UDS: Benzodiazepines (Ativan, Xanax, Valium) etc, Barbiturates, Narcotics (Avinza, Butrans, hydrocodone, Codeine, Lexi, Methadone, Morphine, MS Contin, Opana, oxycodone, Oxycontin, Suboxone etc), Pregabalin (Lyrica), Tramadol (Ultacet, Utram etc), Tapentadol, (Nucynta) and Elist Drugs (cocaine, THC, Etc.)\par \par Risk factors: Bipolar Illness, positive for any an illicit drugs, history of any ETOH and drug abuse, any signs of diversion, Sharing Meds, selling meds. Non consistent [place] drug reporting and above 120meq of morphine.

## 2023-06-08 ENCOUNTER — APPOINTMENT (OUTPATIENT)
Dept: PAIN MANAGEMENT | Facility: CLINIC | Age: 53
End: 2023-06-08

## 2023-06-13 ENCOUNTER — APPOINTMENT (OUTPATIENT)
Dept: PAIN MANAGEMENT | Facility: CLINIC | Age: 53
End: 2023-06-13

## 2023-06-16 ENCOUNTER — APPOINTMENT (OUTPATIENT)
Dept: PAIN MANAGEMENT | Facility: CLINIC | Age: 53
End: 2023-06-16
Payer: MEDICARE

## 2023-06-16 VITALS
HEIGHT: 65 IN | DIASTOLIC BLOOD PRESSURE: 76 MMHG | WEIGHT: 100 LBS | SYSTOLIC BLOOD PRESSURE: 115 MMHG | HEART RATE: 66 BPM | BODY MASS INDEX: 16.66 KG/M2

## 2023-06-16 PROCEDURE — 99213 OFFICE O/P EST LOW 20 MIN: CPT

## 2023-06-16 NOTE — PHYSICAL EXAM
[Normal Coordination] : normal coordination [Normal DTR UE/LE] : normal DTR UE/LE  [Normal Sensation] : normal sensation [Normal Mood and Affect] : normal mood and affect [Orientated] : orientated [Able to Communicate] : able to communicate [Normal Skin] : normal skin [No Rash] : no rash [No Ulcers] : no ulcers [No Lesions] : no lesions [No obvious lymphadenopathy in areas examined] : no obvious lymphadenopathy in areas examined [Well Developed] : well developed [Well Nourished] : well nourished [Flexion] : flexion [Extension] : extension [Left] : left shoulder [] : no scapular winging [TWNoteComboBox7] : active forward flexion 80 degrees [TWNoteComboBox4] : passive forward flexion 90 degrees [de-identified] : active abduction 45 degrees [FreeTextEntry3] : Skin appears shiny, hand reddish in color, hand cooler than right hand [de-identified] : Allodynia, hypersensitivity of left hand

## 2023-06-16 NOTE — ASSESSMENT
[FreeTextEntry1] : This is a 52 year old woman who has chronic pain and suffers from RSD. She has been having significant pain and parasthesias s/p her left shoulder arthroscopy. She experiences sudomotor and vasomotor in the left hand.Pain is likely related to Complex Regional Pain Syndrome. As per recent P2P patient needs to undergo Physical Therapy and only if failed Stellate Ganglion block can be considered.She will continue with Percocet 10-325mg BID which provides at least 50% pain relief and allows her to perform ADLs with no problem.\par \par We will start patient on Physical Therapy and the script was given today.\par \par \par William Casey PA-C\par Marlen Rivers M.D., FAAPMR\par \par \par \par
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
## 2023-06-20 ENCOUNTER — NON-APPOINTMENT (OUTPATIENT)
Age: 53
End: 2023-06-20

## 2023-07-17 ENCOUNTER — APPOINTMENT (OUTPATIENT)
Dept: PAIN MANAGEMENT | Facility: CLINIC | Age: 53
End: 2023-07-17
Payer: MEDICARE

## 2023-07-17 VITALS — BODY MASS INDEX: 16.66 KG/M2 | WEIGHT: 100 LBS | HEIGHT: 65 IN

## 2023-07-17 DIAGNOSIS — G90.519 COMPLEX REGIONAL PAIN SYNDROME I OF UNSPECIFIED UPPER LIMB: ICD-10-CM

## 2023-07-17 PROCEDURE — 99213 OFFICE O/P EST LOW 20 MIN: CPT

## 2023-07-17 NOTE — ASSESSMENT
[FreeTextEntry1] : This is a 52 year old woman who has chronic pain and suffers from RSD. She has been having significant pain and parasthesias s/p her left shoulder arthroscopy. She experiences sudomotor and vasomotor in the left hand.Pain is likely related to Complex Regional Pain Syndrome. As per recent P2P patient needs to undergo Physical Therapy and only if failed Stellate Ganglion block can be considered.She will continue with Percocet 10-325mg BID which provides at least 50% pain relief and allows her to perform ADLs with no problem.\par Patient is to continue  Physical Therapy.\par \par \par William Casey PA-C\par Marlen Rivers M.D., [facility]\par \par \par \par

## 2023-07-17 NOTE — PHYSICAL EXAM
[Normal Coordination] : normal coordination [Normal DTR UE/LE] : normal DTR UE/LE  [Normal Sensation] : normal sensation [Normal Mood and Affect] : normal mood and affect [Orientated] : orientated [Able to Communicate] : able to communicate [Normal Skin] : normal skin [No Rash] : no rash [No Ulcers] : no ulcers [No Lesions] : no lesions [No obvious lymphadenopathy in areas examined] : no obvious lymphadenopathy in areas examined [Well Developed] : well developed [Well Nourished] : well nourished [Flexion] : flexion [Extension] : extension [Left] : left shoulder [] : no scapular winging [TWNoteComboBox7] : active forward flexion 80 degrees [TWNoteComboBox4] : passive forward flexion 90 degrees [de-identified] : active abduction 45 degrees [FreeTextEntry3] : Skin appears shiny, hand reddish in color, hand cooler than right hand [de-identified] : Allodynia, hypersensitivity of left hand
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
## 2023-08-14 ENCOUNTER — APPOINTMENT (OUTPATIENT)
Dept: PAIN MANAGEMENT | Facility: CLINIC | Age: 53
End: 2023-08-14
Payer: MEDICARE

## 2023-08-14 VITALS
WEIGHT: 100 LBS | SYSTOLIC BLOOD PRESSURE: 121 MMHG | BODY MASS INDEX: 16.66 KG/M2 | HEART RATE: 63 BPM | DIASTOLIC BLOOD PRESSURE: 78 MMHG | HEIGHT: 65 IN

## 2023-08-14 PROCEDURE — 99213 OFFICE O/P EST LOW 20 MIN: CPT
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
## 2023-08-14 NOTE — PHYSICAL EXAM
[Normal Coordination] : normal coordination [Normal DTR UE/LE] : normal DTR UE/LE  [Normal Sensation] : normal sensation [Normal Mood and Affect] : normal mood and affect [Orientated] : orientated [Able to Communicate] : able to communicate [Normal Skin] : normal skin [No Rash] : no rash [No Ulcers] : no ulcers [No Lesions] : no lesions [No obvious lymphadenopathy in areas examined] : no obvious lymphadenopathy in areas examined [Well Developed] : well developed [Well Nourished] : well nourished [Flexion] : flexion [Extension] : extension [Left] : left shoulder [TWNoteComboBox7] : active forward flexion 80 degrees [TWNoteComboBox4] : passive forward flexion 90 degrees [de-identified] : active abduction 45 degrees [] : hand swelling [FreeTextEntry3] : Skin appears shiny, hand reddish in color, hand cooler than right hand [de-identified] : Allodynia, hypersensitivity of left hand

## 2023-08-14 NOTE — ASSESSMENT
[FreeTextEntry1] : This is a 52 year old woman who has chronic pain and suffers from RSD. She has been having significant pain and parasthesias s/p her left shoulder arthroscopy. She experiences sudomotor and vasomotor in the left hand.Pain is likely related to Complex Regional Pain Syndrome. As per recent P2P patient needs to undergo Physical Therapy and only if failed Stellate Ganglion block can be considered.She will continue with Percocet 10-325mg BID which provides at least 50% pain relief and allows her to perform ADLs with no problem.\par  Patient is to continue  Physical Therapy.\par  \par  \par  William Casey PA-C\par  Marlen Rivers M.D., [facility]\par  \par  \par  \par

## 2023-08-21 ENCOUNTER — NON-APPOINTMENT (OUTPATIENT)
Age: 53
End: 2023-08-21

## 2023-08-21 DIAGNOSIS — G56.01 CARPAL TUNNEL SYNDROME, RIGHT UPPER LIMB: ICD-10-CM

## 2023-08-21 DIAGNOSIS — Z87.39 PERSONAL HISTORY OF OTHER DISEASES OF THE MUSCULOSKELETAL SYSTEM AND CONNECTIVE TISSUE: ICD-10-CM

## 2023-08-21 DIAGNOSIS — Z81.8 FAMILY HISTORY OF OTHER MENTAL AND BEHAVIORAL DISORDERS: ICD-10-CM

## 2023-08-21 DIAGNOSIS — K21.9 GASTRO-ESOPHAGEAL REFLUX DISEASE W/OUT ESOPHAGITIS: ICD-10-CM

## 2023-08-21 DIAGNOSIS — G52.8 DISORDERS OF OTHER SPECIFIED CRANIAL NERVES: ICD-10-CM

## 2023-08-21 DIAGNOSIS — G43.909 MIGRAINE, UNSPECIFIED, NOT INTRACTABLE, W/OUT STATUS MIGRAINOSUS: ICD-10-CM

## 2023-08-21 DIAGNOSIS — G47.00 INSOMNIA, UNSPECIFIED: ICD-10-CM

## 2023-08-21 DIAGNOSIS — G56.23 LESION OF ULNAR NERVE, BILATERAL UPPER LIMBS: ICD-10-CM

## 2023-08-21 DIAGNOSIS — Z92.89 PERSONAL HISTORY OF OTHER MEDICAL TREATMENT: ICD-10-CM

## 2023-08-21 DIAGNOSIS — G54.0 BRACHIAL PLEXUS DISORDERS: ICD-10-CM

## 2023-08-21 RX ORDER — OMEPRAZOLE 40 MG/1
40 CAPSULE, DELAYED RELEASE ORAL
Refills: 0 | Status: ACTIVE | COMMUNITY

## 2023-09-11 ENCOUNTER — APPOINTMENT (OUTPATIENT)
Dept: PAIN MANAGEMENT | Facility: CLINIC | Age: 53
End: 2023-09-11
Payer: MEDICARE

## 2023-09-11 PROCEDURE — 99213 OFFICE O/P EST LOW 20 MIN: CPT

## 2023-09-13 NOTE — PATIENT PROFILE ADULT - DOMESTIC TRAVEL HIGH RISK QUESTION
No signs of skull fracture or neck injury. No signs of concussion or intracranial injury. Continue ice and conservative measures.  Recheck Monday
S/p debridement of exudative material and devitalized material. Good granulation tissue exposed. I reviewed wound care with pt.  Recheck Monday - earlier if signs of infection occurs
With forehead hematoma. No signs of skull fracture or neck injury. No signs of concussion or intracranial injury. Continue ice and conservative measures.  Recheck Monday
No

## 2023-10-05 ENCOUNTER — APPOINTMENT (OUTPATIENT)
Dept: NEUROLOGY | Facility: CLINIC | Age: 53
End: 2023-10-05
Payer: MEDICARE

## 2023-10-05 PROCEDURE — 99214 OFFICE O/P EST MOD 30 MIN: CPT

## 2023-10-05 RX ORDER — AMLODIPINE BESYLATE 10 MG/1
10 TABLET ORAL DAILY
Refills: 0 | Status: DISCONTINUED | COMMUNITY
End: 2023-10-05

## 2023-10-05 RX ORDER — IBUPROFEN 200 MG/1
200 CAPSULE, LIQUID FILLED ORAL
Refills: 0 | Status: DISCONTINUED | COMMUNITY
End: 2023-10-05

## 2023-10-05 RX ORDER — IBUPROFEN 600 MG/1
600 TABLET, FILM COATED ORAL 3 TIMES DAILY
Refills: 0 | Status: DISCONTINUED | COMMUNITY
End: 2023-10-05

## 2023-10-05 RX ORDER — BACLOFEN 10 MG/1
10 TABLET ORAL DAILY
Refills: 0 | Status: DISCONTINUED | COMMUNITY
End: 2023-10-05

## 2023-10-05 RX ORDER — TOPIRAMATE 100 MG/1
100 TABLET, FILM COATED ORAL
Refills: 0 | Status: ACTIVE | COMMUNITY

## 2023-10-05 RX ORDER — OXYCODONE HYDROCHLORIDE AND ACETAMINOPHEN 5; 325 MG/1; MG/1
5-325 TABLET ORAL DAILY
Refills: 0 | Status: DISCONTINUED | COMMUNITY
End: 2023-10-05

## 2023-10-05 RX ORDER — METHYLPREDNISOLONE 4 MG/1
4 TABLET ORAL
Qty: 1 | Refills: 0 | Status: DISCONTINUED | COMMUNITY
Start: 2022-10-07 | End: 2023-10-05

## 2023-10-09 ENCOUNTER — LABORATORY RESULT (OUTPATIENT)
Age: 53
End: 2023-10-09

## 2023-10-09 ENCOUNTER — APPOINTMENT (OUTPATIENT)
Dept: PAIN MANAGEMENT | Facility: CLINIC | Age: 53
End: 2023-10-09
Payer: MEDICARE

## 2023-10-09 ENCOUNTER — RESULT CHARGE (OUTPATIENT)
Age: 53
End: 2023-10-09

## 2023-10-09 VITALS
HEART RATE: 66 BPM | DIASTOLIC BLOOD PRESSURE: 76 MMHG | WEIGHT: 100 LBS | SYSTOLIC BLOOD PRESSURE: 108 MMHG | BODY MASS INDEX: 16.66 KG/M2 | HEIGHT: 65 IN

## 2023-10-09 LAB
AMP / AMPHETAMINE: NEGATIVE
BAR / SECOBARBITAL: NEGATIVE
BUP / BUPRENORPHINE: NEGATIVE
BZO / OXAZEPAM: NEGATIVE
COC / COCAINE: NEGATIVE
CREATININE: NORMAL MG/DL
MDMA / METHYLENEDIOXYMETHAMPHETAMINE: NEGATIVE
MET / METHAMPHETAMINES: NEGATIVE
MOP / MORPHINE: NEGATIVE
MTD / METHADONE: NEGATIVE
OXY / OXYCODONE: POSITIVE
PCP / PHENCYCLIDINE: NEGATIVE
PH: NORMAL
SPECIFIC GRAVITY: NORMAL
TEMPERATURE: NORMAL F
THC / MARIJUANA: NEGATIVE

## 2023-10-09 PROCEDURE — 80305 DRUG TEST PRSMV DIR OPT OBS: CPT | Mod: QW

## 2023-10-09 PROCEDURE — 99213 OFFICE O/P EST LOW 20 MIN: CPT

## 2023-10-16 LAB

## 2023-10-23 ENCOUNTER — APPOINTMENT (OUTPATIENT)
Dept: CARDIOLOGY | Facility: CLINIC | Age: 53
End: 2023-10-23

## 2023-11-06 ENCOUNTER — APPOINTMENT (OUTPATIENT)
Dept: PAIN MANAGEMENT | Facility: CLINIC | Age: 53
End: 2023-11-06
Payer: MEDICARE

## 2023-11-06 VITALS
HEIGHT: 65 IN | DIASTOLIC BLOOD PRESSURE: 74 MMHG | WEIGHT: 100 LBS | SYSTOLIC BLOOD PRESSURE: 114 MMHG | HEART RATE: 56 BPM | BODY MASS INDEX: 16.66 KG/M2

## 2023-11-06 PROCEDURE — 99214 OFFICE O/P EST MOD 30 MIN: CPT

## 2023-12-04 ENCOUNTER — RESULT CHARGE (OUTPATIENT)
Age: 53
End: 2023-12-04

## 2023-12-04 ENCOUNTER — LABORATORY RESULT (OUTPATIENT)
Age: 53
End: 2023-12-04

## 2023-12-04 ENCOUNTER — APPOINTMENT (OUTPATIENT)
Dept: PAIN MANAGEMENT | Facility: CLINIC | Age: 53
End: 2023-12-04
Payer: MEDICARE

## 2023-12-04 VITALS
BODY MASS INDEX: 16.66 KG/M2 | HEIGHT: 65 IN | SYSTOLIC BLOOD PRESSURE: 107 MMHG | DIASTOLIC BLOOD PRESSURE: 73 MMHG | WEIGHT: 100 LBS | HEART RATE: 61 BPM

## 2023-12-04 LAB
AMP / AMPHETAMINE: NEGATIVE
BAR / SECOBARBITAL: NEGATIVE
BUP / BUPRENORPHINE: NEGATIVE
BZO / OXAZEPAM: NEGATIVE
COC / COCAINE: NEGATIVE
CREATININE: NORMAL MG/DL
MDMA / METHYLENEDIOXYMETHAMPHETAMINE: NEGATIVE
MET / METHAMPHETAMINES: NEGATIVE
MOP / MORPHINE: NEGATIVE
MTD / METHADONE: NEGATIVE
OXY / OXYCODONE: NEGATIVE
PCP / PHENCYCLIDINE: NEGATIVE
PH: NORMAL
SPECIFIC GRAVITY: NORMAL
TEMPERATURE: 90 F
THC / MARIJUANA: NEGATIVE

## 2023-12-04 PROCEDURE — 80305 DRUG TEST PRSMV DIR OPT OBS: CPT | Mod: QW

## 2023-12-04 PROCEDURE — 99214 OFFICE O/P EST MOD 30 MIN: CPT

## 2023-12-11 LAB
PM 6 MAM: NEGATIVE NG/ML
PM 7-AMINO-CLONAZ: NEGATIVE NG/ML
PM ALPHA-HYDROXY-ALPRAZOLAM: NEGATIVE NG/ML
PM ALPHA-HYDROXY-MIDAZOLAM: NEGATIVE NG/ML
PM ALPRAZOLAM: NEGATIVE NG/ML
PM AMOBARBITAL: NEGATIVE NG/ML
PM AMPHETAMINE INTERP: NEGATIVE
PM AMPHETAMINE: NEGATIVE NG/ML
PM BARBURATES INTERP: NEGATIVE
PM BEG: NEGATIVE NG/ML
PM BENZODIAZEPINES INTERP: NEGATIVE
PM BUPRENORPHINE INTERP: NEGATIVE
PM BUPRENORPHINE: NEGATIVE NG/ML
PM BUTALBITAL: NEGATIVE NG/ML
PM CLONAZEPAM: NEGATIVE NG/ML
PM COCAINE INTERP: NEGATIVE
PM COCAINE: NEGATIVE NG/ML
PM CODIENE: NEGATIVE NG/ML
PM COTININE: NEGATIVE NG/ML
PM DIAZEPAM: NEGATIVE NG/ML
PM DIHYROCODEINE: NEGATIVE NG/ML
PM EDDP: NEGATIVE NG/ML
PM FENTANYL INTERP: NEGATIVE
PM FENTANYL: NEGATIVE NG/ML
PM FLUNITRAZEPAM: NEGATIVE NG/ML
PM FLURAZEPAM: NEGATIVE NG/ML
PM HYDROCODONE: NEGATIVE NG/ML
PM HYDROMORPHONE: NEGATIVE NG/ML
PM LORAZEPAM: NEGATIVE NG/ML
PM MARIJUANA (DELTA-9-THC): NEGATIVE NG/ML
PM MARIJUANA INTERP: NEGATIVE
PM MDA: NEGATIVE NG/ML
PM MDEA: NEGATIVE NG/ML
PM MDMA: NEGATIVE NG/ML
PM MEPERIDINE: NEGATIVE NG/ML
PM METHADONE INTERP: NEGATIVE
PM METHADONE: NEGATIVE NG/ML
PM METHAMPHETAMINE: NEGATIVE NG/ML
PM MIDAZOLAM: NEGATIVE NG/ML
PM MORPHINE: NEGATIVE NG/ML
PM NALOXONE: NEGATIVE NG/ML
PM NALTREXONE: NEGATIVE NG/ML
PM NICOTINE INTERP: NEGATIVE
PM NORBUPRENORPHINE: NEGATIVE NG/ML
PM NORDIAZEPAM: NEGATIVE NG/ML
PM NORFENTANYL: NEGATIVE NG/ML
PM NORMEPERIDINE: NEGATIVE NG/ML
PM NOROXYCODONE: 233 NG/ML
PM OPIOID INTERP: NEGATIVE
PM OXAZEPAM: NEGATIVE NG/ML
PM OXXYCODONE INTERP: NEGATIVE
PM OXYCODONE: NEGATIVE NG/ML
PM OXYMORPHONE: NEGATIVE NG/ML
PM PCP: NEGATIVE NG/ML
PM PHENCYCLIDINE INTERP: NEGATIVE
PM PHENOBARBITAL: NEGATIVE NG/ML
PM PPX: NEGATIVE NG/ML
PM PROPOXYPHENE INTERP: NEGATIVE
PM SECOBARBITAL: NEGATIVE NG/ML
PM SUFENTANIL: NEGATIVE NG/ML
PM TAPENTADOL: NEGATIVE NG/ML
PM TEMAZEPAM: NEGATIVE NG/ML
PM TRAMADOL INTERP: NEGATIVE
PM TRAMADOL: NEGATIVE NG/ML

## 2024-01-08 ENCOUNTER — LABORATORY RESULT (OUTPATIENT)
Age: 54
End: 2024-01-08

## 2024-01-08 ENCOUNTER — RESULT CHARGE (OUTPATIENT)
Age: 54
End: 2024-01-08

## 2024-01-08 ENCOUNTER — APPOINTMENT (OUTPATIENT)
Dept: PAIN MANAGEMENT | Facility: CLINIC | Age: 54
End: 2024-01-08
Payer: MEDICARE

## 2024-01-08 VITALS
HEART RATE: 68 BPM | HEIGHT: 65 IN | BODY MASS INDEX: 16.66 KG/M2 | SYSTOLIC BLOOD PRESSURE: 114 MMHG | DIASTOLIC BLOOD PRESSURE: 75 MMHG | WEIGHT: 100 LBS

## 2024-01-08 PROCEDURE — 99214 OFFICE O/P EST MOD 30 MIN: CPT

## 2024-01-08 PROCEDURE — 80305 DRUG TEST PRSMV DIR OPT OBS: CPT | Mod: QW
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
## 2024-01-08 NOTE — PHYSICAL EXAM
[Normal Coordination] : normal coordination [Normal DTR UE/LE] : normal DTR UE/LE  [Normal Sensation] : normal sensation [Normal Mood and Affect] : normal mood and affect [Orientated] : orientated [Able to Communicate] : able to communicate [Normal Skin] : normal skin [No Rash] : no rash [No Ulcers] : no ulcers [No Lesions] : no lesions [No obvious lymphadenopathy in areas examined] : no obvious lymphadenopathy in areas examined [Well Developed] : well developed [Well Nourished] : well nourished [Flexion] : flexion [Extension] : extension [Left] : left shoulder [TWNoteComboBox7] : active forward flexion 80 degrees [TWNoteComboBox4] : passive forward flexion 90 degrees [de-identified] : active abduction 45 degrees [] : hand swelling [FreeTextEntry3] : Skin appears shiny, hand reddish in color, hand cooler than right hand [de-identified] : Allodynia, hypersensitivity of left hand

## 2024-01-08 NOTE — DISCUSSION/SUMMARY
[Medication Risks Reviewed] : Medication risks reviewed [de-identified] : This is a 53 year old woman who has chronic pain and suffers from CIDP. She has been having significant pain and parasthesias s/p her left shoulder arthroscopy. She experiences sudomotor and vasomotor in the left hand. Pain is likely related to Complex Regional Pain Syndrome. She is to continue Physical Therapy. She will continue with Percocet 10-325mg BID which provides at least 50% pain relief and allows her to perform ADLs with no problem. We will re-evaluate in 4 weeks.  UDS will repeat today.  Total clinician time spent today on the patient is 30 minutes including preparing to see the patient, obtaining and/or reviewing and confirming history, performing medically necessary and appropriate examination, counseling and educating the patient and/or family, documenting clinical information in the EHR and communicating and/or referring to other healthcare professionals.   NETTA Lehman M.D., FAAPMR.

## 2024-01-11 ENCOUNTER — APPOINTMENT (OUTPATIENT)
Dept: NEUROLOGY | Facility: CLINIC | Age: 54
End: 2024-01-11

## 2024-02-08 ENCOUNTER — APPOINTMENT (OUTPATIENT)
Dept: PAIN MANAGEMENT | Facility: CLINIC | Age: 54
End: 2024-02-08
Payer: MEDICARE

## 2024-02-08 DIAGNOSIS — M75.02 ADHESIVE CAPSULITIS OF LEFT SHOULDER: ICD-10-CM

## 2024-02-08 PROCEDURE — 99214 OFFICE O/P EST MOD 30 MIN: CPT

## 2024-02-08 NOTE — ASSESSMENT
[FreeTextEntry1] : This is a 53 year old woman who has chronic pain and suffers from CIDP. She has been having significant pain and parasthesias s/p her left shoulder arthroscopy. She experiences sudomotor and vasomotor in the left hand. Pain is likely related to Complex Regional Pain Syndrome. She is to continue Physical Therapy. She will continue with Percocet 10-325mg BID which provides at least 50% pain relief and allows her to perform ADLs with no problem. We will re-evaluate in 4 weeks. All this patients questions were answered and the conversation was understood well.  I, Sheri Neri, attest that this documentation has been prepared under the direction and in the presence of Provider Marlen Rivers MD.   Thank you for allowing me to assist in the management of this patient.    Best Regards,    Marlen Rivres M.D., FAAPMR   Diplomate, American Board of Physical Medicine and Rehabilitation Diplomate, American Board of Pain Medicine  Diplomate, American Board of Pain Management

## 2024-02-08 NOTE — PHYSICAL EXAM
[Normal Coordination] : normal coordination [Normal DTR UE/LE] : normal DTR UE/LE  [Normal Sensation] : normal sensation [Normal Mood and Affect] : normal mood and affect [Orientated] : orientated [Able to Communicate] : able to communicate [Normal Skin] : normal skin [No Rash] : no rash [No Ulcers] : no ulcers [No Lesions] : no lesions [No obvious lymphadenopathy in areas examined] : no obvious lymphadenopathy in areas examined [Well Developed] : well developed [Well Nourished] : well nourished [Flexion] : flexion [Extension] : extension [Left] : left shoulder [] : no scapular winging [TWNoteComboBox7] : active forward flexion 80 degrees [TWNoteComboBox4] : passive forward flexion 90 degrees [de-identified] : active abduction 45 degrees [FreeTextEntry3] : Skin appears shiny, hand reddish in color, hand cooler than right hand [de-identified] : Allodynia, hypersensitivity of left hand

## 2024-02-08 NOTE — DATA REVIEWED
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 Superimposed multilevel cervical radiculopathy, brachial plexopathy can not be ruled out.    MRI of the left shoulder dated 6/21/22 shows evidence of adhesive capsulitis. Tear of the superior labrum extending to involve the posterior superior labrum. Degenerative cahnge in the acromioclavicular joint.   MRI Lumbar Spine 12/2020: MRI of the lumbar spine reportedly show right foraminal herniation and disc bulge at L2-L3 extending to the lateral recess and foramina abutting the exiting right L2 nerve root. There is also posterior disc herniation with left foraminal annular tear L4-L5 and disc bulge at L3-L4.   EMG of the lower extremities 3/18/2019 revealed L4-L5 lumbar radiculopathy. EMG of the upper extremities revealed C5-C6 cervical radiculopathy.   SOAPP: low on 7/15/22 Patient has combination of a low-risk SOAP and no risk factors. UDS would be repeated randomly every quarter.  UDS: 12/04/23, +OXY.   [place] REGISTRY: Checked.
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
## 2024-03-07 ENCOUNTER — APPOINTMENT (OUTPATIENT)
Dept: PAIN MANAGEMENT | Facility: CLINIC | Age: 54
End: 2024-03-07
Payer: MEDICARE

## 2024-03-07 DIAGNOSIS — M47.818 SPONDYLOSIS W/OUT MYELOPATHY OR RADICULOPATHY, SACRAL AND SACROCOCCYGEAL REGION: ICD-10-CM

## 2024-03-07 PROCEDURE — 99214 OFFICE O/P EST MOD 30 MIN: CPT

## 2024-03-07 RX ORDER — CYCLOBENZAPRINE HYDROCHLORIDE 7.5 MG/1
TABLET, FILM COATED ORAL
Refills: 0 | Status: ACTIVE | COMMUNITY

## 2024-03-08 NOTE — PHYSICAL EXAM
[Normal Coordination] : normal coordination [Normal Mood and Affect] : normal mood and affect [Normal DTR UE/LE] : normal DTR UE/LE  [Normal Sensation] : normal sensation [Oriented] : oriented [Able to Communicate] : able to communicate [Normal Skin] : normal skin [No Rash] : no rash [No Ulcers] : no ulcers [No Lesions] : no lesions [No obvious lymphadenopathy in areas examined] : no obvious lymphadenopathy in areas examined [Well Developed] : well developed [Well Nourished] : well nourished [Flexion] : flexion [Extension] : extension [Left] : left shoulder [] : no scapular winging [TWNoteComboBox7] : active forward flexion 80 degrees [TWNoteComboBox4] : passive forward flexion 90 degrees [de-identified] : active abduction 45 degrees [FreeTextEntry3] : Skin appears shiny, hand reddish in color, hand cooler than right hand [de-identified] : Allodynia, hypersensitivity of left hand

## 2024-03-08 NOTE — ASSESSMENT
[FreeTextEntry1] : This is a 53 year old woman who has chronic pain. She is to continue Physical Therapy, a new script was given to her today. She will continue with Percocet 10-325mg BID which allows her to perform ADL's with no problem. We will re-evaluate in 4 weeks. All this patients questions were answered and the conversation was understood well.  Overall there is at least a 30-50% reduction in pain with the prescribed analgesics. The patient denies any adverse side effects due to the medication (sleeping disturbance, constipation, sleepiness, hallucinations and/or urination problems).   I have consulted the  registry for the purpose of reviewing the patient's controlled substance.   I explained the risk of addiction, tolerance and withdrawals and  advised the patient they must keep their medication under a lock and key, or in a safe place away from children or other individuals. This medication given is solely for the patient and under no circumstances to be shared. Patient verbalized this and is in agreement with the aforementioned. I explained the risk of addiction, tolerance, and withdrawals. UDS will be done randomly and a drug agreement was signed.  The patient will return to the office in 4 weeks time and is aware to contact me with any question or concerns in the interim.  NETTA Goldsmith MD
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
## 2024-03-10 PROBLEM — Z71.82 EXERCISE COUNSELING: Status: ACTIVE | Noted: 2022-09-09

## 2024-04-04 ENCOUNTER — APPOINTMENT (OUTPATIENT)
Dept: PAIN MANAGEMENT | Facility: CLINIC | Age: 54
End: 2024-04-04
Payer: MEDICARE

## 2024-04-04 PROCEDURE — 99214 OFFICE O/P EST MOD 30 MIN: CPT
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
## 2024-04-04 NOTE — PHYSICAL EXAM
[Normal Coordination] : normal coordination [Normal DTR UE/LE] : normal DTR UE/LE  [Normal Sensation] : normal sensation [Normal Mood and Affect] : normal mood and affect [Oriented] : oriented [Able to Communicate] : able to communicate [Normal Skin] : normal skin [No Rash] : no rash [No Ulcers] : no ulcers [No Lesions] : no lesions [No obvious lymphadenopathy in areas examined] : no obvious lymphadenopathy in areas examined [Well Developed] : well developed [Well Nourished] : well nourished [Flexion] : flexion [Extension] : extension [Left] : left shoulder [TWNoteComboBox7] : active forward flexion 80 degrees [TWNoteComboBox4] : passive forward flexion 90 degrees [de-identified] : active abduction 45 degrees [] : hand swelling [FreeTextEntry3] : Skin appears shiny, hand reddish in color, hand cooler than right hand [de-identified] : Allodynia, hypersensitivity of left hand

## 2024-04-04 NOTE — DISCUSSION/SUMMARY
[Medication Risks Reviewed] : Medication risks reviewed [de-identified] : This is a 53 year old woman who has chronic pain.  She will continue with Percocet 10-325mg BID which allows her to perform ADL's with no problem. We will re-evaluate in 4 weeks. All this patients questions were answered and the conversation was understood well.  Overall there is at least a 30-50% reduction in pain with the prescribed analgesics. The patient denies any adverse side effects due to the medication (sleeping disturbance, constipation, sleepiness, hallucinations and/or urination problems).   I have consulted the  registry for the purpose of reviewing the patient's controlled substance.   I explained the risk of addiction, tolerance and withdrawals and  advised the patient they must keep their medication under a lock and key, or in a safe place away from children or other individuals. This medication given is solely for the patient and under no circumstances to be shared. Patient verbalized this and is in agreement with the aforementioned. I explained the risk of addiction, tolerance, and withdrawals. UDS will be done randomly and a drug agreement was signed.  The patient will return to the office in 4 weeks time and is aware to contact me with any question or concerns in the interim.  Total clinician time spent today on the patient is 30 minutes including preparing to see the patient, obtaining and/or reviewing and confirming history, performing medically necessary and appropriate examination, counseling and educating the patient and/or family, documenting clinical information in the EHR and communicating and/or referring to other healthcare professionals.  NETTA Lehman MD

## 2024-05-02 ENCOUNTER — APPOINTMENT (OUTPATIENT)
Dept: PAIN MANAGEMENT | Facility: CLINIC | Age: 54
End: 2024-05-02
Payer: MEDICARE

## 2024-05-02 DIAGNOSIS — M54.50 LOW BACK PAIN, UNSPECIFIED: ICD-10-CM

## 2024-05-02 DIAGNOSIS — M79.7 FIBROMYALGIA: ICD-10-CM

## 2024-05-02 PROCEDURE — 99214 OFFICE O/P EST MOD 30 MIN: CPT
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
## 2024-05-02 NOTE — PHYSICAL EXAM
[Normal Coordination] : normal coordination [Normal DTR UE/LE] : normal DTR UE/LE  [Normal Sensation] : normal sensation [Normal Mood and Affect] : normal mood and affect [Oriented] : oriented [Able to Communicate] : able to communicate [Normal Skin] : normal skin [No Rash] : no rash [No Ulcers] : no ulcers [No Lesions] : no lesions [No obvious lymphadenopathy in areas examined] : no obvious lymphadenopathy in areas examined [Well Developed] : well developed [Well Nourished] : well nourished [Flexion] : flexion [Extension] : extension [Left] : left shoulder [TWNoteComboBox7] : active forward flexion 80 degrees [TWNoteComboBox4] : passive forward flexion 90 degrees [de-identified] : active abduction 45 degrees [] : hand swelling [FreeTextEntry3] : Skin appears shiny, hand reddish in color, hand cooler than right hand [de-identified] : Allodynia, hypersensitivity of left hand

## 2024-05-02 NOTE — DISCUSSION/SUMMARY
[Medication Risks Reviewed] : Medication risks reviewed [de-identified] : This is a 53 year old woman who has chronic pain.  She will continue with Percocet 10-325mg BID which allows her to perform ADL's with no problem. We will re-evaluate in 4 weeks. All this patients questions were answered and the conversation was understood well.  Overall there is at least a 30-50% reduction in pain with the prescribed analgesics. The patient denies any adverse side effects due to the medication (sleeping disturbance, constipation, sleepiness, hallucinations and/or urination problems).   I have consulted the  registry for the purpose of reviewing the patient's controlled substance.   I explained the risk of addiction, tolerance and withdrawals and  advised the patient they must keep their medication under a lock and key, or in a safe place away from children or other individuals. This medication given is solely for the patient and under no circumstances to be shared. Patient verbalized this and is in agreement with the aforementioned. I explained the risk of addiction, tolerance, and withdrawals. UDS will be done randomly and a drug agreement was signed.  The patient will return to the office in 4 weeks time and is aware to contact me with any question or concerns in the interim.  Total clinician time spent today on the patient is 30 minutes including preparing to see the patient, obtaining and/or reviewing and confirming history, performing medically necessary and appropriate examination, counseling and educating the patient and/or family, documenting clinical information in the EHR and communicating and/or referring to other healthcare professionals.  NETTA Lehman MD

## 2024-05-08 ENCOUNTER — APPOINTMENT (OUTPATIENT)
Dept: NEUROSURGERY | Facility: CLINIC | Age: 54
End: 2024-05-08

## 2024-05-08 VITALS — BODY MASS INDEX: 16.66 KG/M2 | WEIGHT: 100 LBS | HEIGHT: 65 IN

## 2024-06-03 ENCOUNTER — APPOINTMENT (OUTPATIENT)
Dept: PAIN MANAGEMENT | Facility: CLINIC | Age: 54
End: 2024-06-03
Payer: MEDICARE

## 2024-06-03 DIAGNOSIS — G61.81 CHRONIC INFLAMMATORY DEMYELINATING POLYNEURITIS: ICD-10-CM

## 2024-06-03 DIAGNOSIS — M25.512 PAIN IN LEFT SHOULDER: ICD-10-CM

## 2024-06-03 PROCEDURE — 99214 OFFICE O/P EST MOD 30 MIN: CPT
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
## 2024-06-03 NOTE — DISCUSSION/SUMMARY
[Medication Risks Reviewed] : Medication risks reviewed [de-identified] : This is a 53 year old woman who has chronic pain.  She will continue with Percocet 10-325mg BID which allows her to perform ADL's with no problem. We will re-evaluate in 4 weeks. All this patients questions were answered and the conversation was understood well.  Overall there is at least a 30-50% reduction in pain with the prescribed analgesics. The patient denies any adverse side effects due to the medication (sleeping disturbance, constipation, sleepiness, hallucinations and/or urination problems).   I have consulted the  registry for the purpose of reviewing the patient's controlled substance.  UDS will repeat on next visit.  I explained the risk of addiction, tolerance and withdrawals and  advised the patient they must keep their medication under a lock and key, or in a safe place away from children or other individuals. This medication given is solely for the patient and under no circumstances to be shared. Patient verbalized this and is in agreement with the aforementioned. I explained the risk of addiction, tolerance, and withdrawals. UDS will be done randomly and a drug agreement was signed.  The patient will return to the office in 4 weeks time and is aware to contact me with any question or concerns in the interim.  Total clinician time spent today on the patient is 30 minutes including preparing to see the patient, obtaining and/or reviewing and confirming history, performing medically necessary and appropriate examination, counseling and educating the patient and/or family, documenting clinical information in the EHR and communicating and/or referring to other healthcare professionals.  NETTA Lehman MD

## 2024-06-03 NOTE — PHYSICAL EXAM
[Normal Coordination] : normal coordination [Normal DTR UE/LE] : normal DTR UE/LE  [Normal Sensation] : normal sensation [Normal Mood and Affect] : normal mood and affect [Oriented] : oriented [Able to Communicate] : able to communicate [Normal Skin] : normal skin [No Rash] : no rash [No Ulcers] : no ulcers [No Lesions] : no lesions [No obvious lymphadenopathy in areas examined] : no obvious lymphadenopathy in areas examined [Well Developed] : well developed [Well Nourished] : well nourished [Flexion] : flexion [Extension] : extension [Left] : left shoulder [TWNoteComboBox7] : active forward flexion 80 degrees [TWNoteComboBox4] : passive forward flexion 90 degrees [de-identified] : active abduction 45 degrees [] : hand swelling [FreeTextEntry3] : Skin appears shiny, hand reddish in color, hand cooler than right hand [de-identified] : Allodynia, hypersensitivity of left hand

## 2024-06-06 ENCOUNTER — APPOINTMENT (OUTPATIENT)
Dept: NEUROLOGY | Facility: CLINIC | Age: 54
End: 2024-06-06

## 2024-06-12 ENCOUNTER — APPOINTMENT (OUTPATIENT)
Dept: NEUROLOGY | Facility: CLINIC | Age: 54
End: 2024-06-12
Payer: MEDICARE

## 2024-06-12 ENCOUNTER — NON-APPOINTMENT (OUTPATIENT)
Age: 54
End: 2024-06-12

## 2024-06-12 DIAGNOSIS — R56.9 UNSPECIFIED CONVULSIONS: ICD-10-CM

## 2024-06-12 DIAGNOSIS — G40.209 LOCALIZATION-RELATED (FOCAL) (PARTIAL) SYMPTOMATIC EPILEPSY AND EPILEPTIC SYNDROMES WITH COMPLEX PARTIAL SEIZURES, NOT INTRACTABLE, W/OUT STATUS EPILEPTICUS: ICD-10-CM

## 2024-06-12 PROCEDURE — 99204 OFFICE O/P NEW MOD 45 MIN: CPT

## 2024-06-12 RX ORDER — MILNACIPRAN HYDROCHLORIDE 25 MG/1
25 TABLET, FILM COATED ORAL
Qty: 8 | Refills: 0 | Status: DISCONTINUED | COMMUNITY
Start: 2023-12-11 | End: 2024-06-12

## 2024-06-12 RX ORDER — ESCITALOPRAM OXALATE 5 MG/1
5 TABLET ORAL
Refills: 0 | Status: ACTIVE | COMMUNITY

## 2024-06-12 RX ORDER — PREGABALIN 75 MG/1
75 CAPSULE ORAL
Refills: 0 | Status: ACTIVE | COMMUNITY

## 2024-06-12 RX ORDER — MILNACIPRAN HYDROCHLORIDE 12.5-25-5
12.5 & 25 & 5 KIT ORAL
Qty: 1 | Refills: 0 | Status: DISCONTINUED | COMMUNITY
Start: 2023-12-04 | End: 2024-06-12

## 2024-06-12 RX ORDER — VENLAFAXINE HYDROCHLORIDE 37.5 MG/1
37.5 CAPSULE, EXTENDED RELEASE ORAL DAILY
Qty: 30 | Refills: 0 | Status: DISCONTINUED | COMMUNITY
Start: 2023-11-06 | End: 2024-06-12

## 2024-06-12 RX ORDER — PREGABALIN 300 MG/1
CAPSULE ORAL
Refills: 0 | Status: DISCONTINUED | COMMUNITY
End: 2024-06-12

## 2024-06-12 RX ORDER — ALPRAZOLAM 0.5 MG/1
0.5 TABLET ORAL
Qty: 30 | Refills: 0 | Status: DISCONTINUED | COMMUNITY
End: 2024-06-12

## 2024-06-12 RX ORDER — MILNACIPRAN HYDROCHLORIDE 12.5 MG/1
12.5 TABLET, FILM COATED ORAL
Qty: 5 | Refills: 0 | Status: DISCONTINUED | COMMUNITY
Start: 2023-12-11 | End: 2024-06-12

## 2024-06-12 RX ORDER — CLONAZEPAM 0.5 MG
0.5 TABLET,DISINTEGRATING ORAL
Refills: 0 | Status: ACTIVE | COMMUNITY

## 2024-06-12 RX ORDER — MILNACIPRAN HYDROCHLORIDE 50 MG/1
50 TABLET, FILM COATED ORAL
Qty: 42 | Refills: 0 | Status: DISCONTINUED | COMMUNITY
Start: 2023-12-11 | End: 2024-06-12

## 2024-06-13 ENCOUNTER — APPOINTMENT (OUTPATIENT)
Dept: NEUROLOGY | Facility: CLINIC | Age: 54
End: 2024-06-13

## 2024-06-19 NOTE — ASSESSMENT
[FreeTextEntry1] : 53-year-old lady with reported history of brain tumor 1999 s/p resection and temporal focal seizures presents from Dr Finnegan office for initial evaluation. Reporting almost daily seizures with olfactory, gustatory, visual, auditory, speech, and autonomic symptoms currently on Topamax after failing multiple AED. Will proceed with VEEG for better characterization of her seizures and possible weaning/ medication adjustment.  Plan: -REEG then VEEG

## 2024-06-19 NOTE — HISTORY OF PRESENT ILLNESS
[FreeTextEntry1] : 53-year-old lady with history of focal temporal seizures 1999 follows with Dr Angel, presents for initial evaluations. She endorses history of seizures described as episodes of aura, unusual smells and tastes, nausea, de ja vu, blurry vision, and episodes of confusion. Episodes are triggered with depression and anxiety. She endorses daily episodes. She is currently on topiramate 300mg/day.  She has tried multiple AED before, as per Dr Angel note, Briviact resulted in side effect of nervousness and headache, Aptiom: severe headache. Tegretol not tolerated. Keppra dizziness, tiredness, and eye twitches, Oxcarbazepine: dizziness and tiredness. Gabapentin: excessive tiredness and weakness. She uses Klonopin for sleep which is prescribed by her PMD. Patient had an unclear episode during encounter where she reported feeling confused, then stopped answering. No loss of consciousness and patient was still able to answer questions but was very slow.

## 2024-06-19 NOTE — END OF VISIT
[] : Resident [FreeTextEntry3] : Patient was seen with the resident parts of the Hx and exam repeated and confirmed

## 2024-06-19 NOTE — PHYSICAL EXAM
[FreeTextEntry1] : GEN: cooperative, but slow to respond during assessment NEURO:    MENTAL STATUS: Awake, alert and oriented.   LANG/SPEECH:No dysarthria or aphasia, slow speech   CRANIAL NERVES:   III, IV, VI: EOM intact, no gaze preference or deviation   VII: no facial asymmetry   VIII: normal hearing to speech   MOTOR: Moving all extremities against gravity Gait: Stable

## 2024-07-01 ENCOUNTER — APPOINTMENT (OUTPATIENT)
Dept: PAIN MANAGEMENT | Facility: CLINIC | Age: 54
End: 2024-07-01
Payer: MEDICARE

## 2024-07-01 ENCOUNTER — LABORATORY RESULT (OUTPATIENT)
Age: 54
End: 2024-07-01

## 2024-07-01 DIAGNOSIS — M54.2 CERVICALGIA: ICD-10-CM

## 2024-07-01 DIAGNOSIS — G89.29 OTHER CHRONIC PAIN: ICD-10-CM

## 2024-07-01 DIAGNOSIS — Z79.891 LONG TERM (CURRENT) USE OF OPIATE ANALGESIC: ICD-10-CM

## 2024-07-01 DIAGNOSIS — M47.817 SPONDYLOSIS W/OUT MYELOPATHY OR RADICULOPATHY, LUMBOSACRAL REGION: ICD-10-CM

## 2024-07-01 PROCEDURE — 99214 OFFICE O/P EST MOD 30 MIN: CPT

## 2024-07-08 LAB
PM 6 MAM: NEGATIVE NG/ML
PM 7-AMINO-CLONAZ: 67 NG/ML
PM ALPHA-HYDROXY-ALPRAZOLAM: NEGATIVE NG/ML
PM ALPHA-HYDROXY-MIDAZOLAM: NEGATIVE NG/ML
PM ALPRAZOLAM: NEGATIVE NG/ML
PM AMOBARBITAL: NEGATIVE NG/ML
PM AMPHETAMINE INTERP: NEGATIVE
PM AMPHETAMINE: NEGATIVE NG/ML
PM BARBURATES INTERP: NEGATIVE
PM BEG: NEGATIVE NG/ML
PM BENZODIAZEPINES INTERP: POSITIVE
PM BUPRENORPHINE INTERP: NEGATIVE
PM BUPRENORPHINE: NEGATIVE NG/ML
PM BUTALBITAL: NEGATIVE NG/ML
PM CLONAZEPAM: NEGATIVE NG/ML
PM COCAINE INTERP: NEGATIVE
PM COCAINE: NEGATIVE NG/ML
PM CODIENE: NEGATIVE NG/ML
PM COTININE: NEGATIVE NG/ML
PM DIAZEPAM: NEGATIVE NG/ML
PM DIHYROCODEINE: NEGATIVE NG/ML
PM EDDP: NEGATIVE NG/ML
PM FENTANYL INTERP: NEGATIVE
PM FENTANYL: NEGATIVE NG/ML
PM FLUNITRAZEPAM: NEGATIVE NG/ML
PM FLURAZEPAM: NEGATIVE NG/ML
PM HYDROCODONE: NEGATIVE NG/ML
PM HYDROMORPHONE: NEGATIVE NG/ML
PM LORAZEPAM: NEGATIVE NG/ML
PM MARIJUANA (DELTA-9-THC): NEGATIVE NG/ML
PM MARIJUANA INTERP: NEGATIVE
PM MDA: NEGATIVE NG/ML
PM MDEA: NEGATIVE NG/ML
PM MDMA: NEGATIVE NG/ML
PM MEPERIDINE: NEGATIVE NG/ML
PM METHADONE INTERP: NEGATIVE
PM METHADONE: NEGATIVE NG/ML
PM METHAMPHETAMINE: NEGATIVE NG/ML
PM MIDAZOLAM: NEGATIVE NG/ML
PM MORPHINE: NEGATIVE NG/ML
PM NALOXONE: NEGATIVE NG/ML
PM NALTREXONE: NEGATIVE NG/ML
PM NICOTINE INTERP: NEGATIVE
PM NORBUPRENORPHINE: NEGATIVE NG/ML
PM NORDIAZEPAM: NEGATIVE NG/ML
PM NORFENTANYL: NEGATIVE NG/ML
PM NORMEPERIDINE: NEGATIVE NG/ML
PM NOROXYCODONE: 420 NG/ML
PM OPIOID INTERP: NEGATIVE
PM OXAZEPAM: NEGATIVE NG/ML
PM OXXYCODONE INTERP: POSITIVE
PM OXYCODONE: NEGATIVE NG/ML
PM OXYMORPHONE: 43 NG/ML
PM PCP: NEGATIVE NG/ML
PM PHENCYCLIDINE INTERP: NEGATIVE
PM PHENOBARBITAL: NEGATIVE NG/ML
PM PPX: NEGATIVE NG/ML
PM PROPOXYPHENE INTERP: NEGATIVE
PM SECOBARBITAL: NEGATIVE NG/ML
PM SUFENTANIL: NEGATIVE NG/ML
PM TAPENTADOL: NEGATIVE NG/ML
PM TEMAZEPAM: NEGATIVE NG/ML
PM TRAMADOL INTERP: NEGATIVE
PM TRAMADOL: NEGATIVE NG/ML

## 2024-07-29 ENCOUNTER — APPOINTMENT (OUTPATIENT)
Dept: PAIN MANAGEMENT | Facility: CLINIC | Age: 54
End: 2024-07-29
Payer: MEDICARE

## 2024-07-29 DIAGNOSIS — G89.29 OTHER CHRONIC PAIN: ICD-10-CM

## 2024-07-29 DIAGNOSIS — M47.817 SPONDYLOSIS W/OUT MYELOPATHY OR RADICULOPATHY, LUMBOSACRAL REGION: ICD-10-CM

## 2024-07-29 DIAGNOSIS — M54.2 CERVICALGIA: ICD-10-CM

## 2024-07-29 PROCEDURE — 99214 OFFICE O/P EST MOD 30 MIN: CPT

## 2024-07-29 NOTE — DISCUSSION/SUMMARY
[Medication Risks Reviewed] : Medication risks reviewed [de-identified] : This is a 53 year old woman who has chronic pain.  She will continue with Percocet 10-325mg BID which allows her to perform ADL's with no problem. We will re-evaluate in 4 weeks. All this patients questions were answered and the conversation was understood well.  Overall there is at least a 30-50% reduction in pain with the prescribed analgesics. The patient denies any adverse side effects due to the medication (sleeping disturbance, constipation, sleepiness, hallucinations and/or urination problems).   I have consulted the  registry for the purpose of reviewing the patient's controlled substance.  UDS from 07/01/2024 is consistent. I explained the risk of addiction, tolerance and withdrawals and  advised the patient they must keep their medication under a lock and key, or in a safe place away from children or other individuals. This medication given is solely for the patient and under no circumstances to be shared. Patient verbalized this and is in agreement with the aforementioned. I explained the risk of addiction, tolerance, and withdrawals. UDS will be done randomly and a drug agreement was signed.  The patient will return to the office in 4 weeks time and is aware to contact me with any question or concerns in the interim.  Total clinician time spent today on the patient is 30 minutes including preparing to see the patient, obtaining and/or reviewing and confirming history, performing medically necessary and appropriate examination, counseling and educating the patient and/or family, documenting clinical information in the EHR and communicating and/or referring to other healthcare professionals.  NETTA Lehman MD

## 2024-07-29 NOTE — PHYSICAL EXAM
[Normal Coordination] : normal coordination [Normal DTR UE/LE] : normal DTR UE/LE  [Normal Sensation] : normal sensation [Normal Mood and Affect] : normal mood and affect [Oriented] : oriented [Able to Communicate] : able to communicate [Normal Skin] : normal skin [No Rash] : no rash [No Ulcers] : no ulcers [No Lesions] : no lesions [No obvious lymphadenopathy in areas examined] : no obvious lymphadenopathy in areas examined [Well Developed] : well developed [Well Nourished] : well nourished [Flexion] : flexion [Extension] : extension [Left] : left shoulder [] : no scapular winging [TWNoteComboBox7] : active forward flexion 80 degrees [TWNoteComboBox4] : passive forward flexion 90 degrees [de-identified] : active abduction 45 degrees [FreeTextEntry3] : Skin appears shiny, hand reddish in color, hand cooler than right hand [de-identified] : Allodynia, hypersensitivity of left hand

## 2024-08-07 ENCOUNTER — OUTPATIENT (OUTPATIENT)
Dept: OUTPATIENT SERVICES | Facility: HOSPITAL | Age: 54
LOS: 1 days | End: 2024-08-07
Payer: MEDICARE

## 2024-08-07 DIAGNOSIS — D36.10 BENIGN NEOPLASM OF PERIPHERAL NERVES AND AUTONOMIC NERVOUS SYSTEM, UNSPECIFIED: Chronic | ICD-10-CM

## 2024-08-07 DIAGNOSIS — Z00.8 ENCOUNTER FOR OTHER GENERAL EXAMINATION: ICD-10-CM

## 2024-08-07 DIAGNOSIS — Z90.710 ACQUIRED ABSENCE OF BOTH CERVIX AND UTERUS: Chronic | ICD-10-CM

## 2024-08-07 DIAGNOSIS — R51.9 HEADACHE, UNSPECIFIED: ICD-10-CM

## 2024-08-07 PROCEDURE — 70551 MRI BRAIN STEM W/O DYE: CPT | Mod: 26

## 2024-08-07 PROCEDURE — 70551 MRI BRAIN STEM W/O DYE: CPT

## 2024-08-08 DIAGNOSIS — R51.9 HEADACHE, UNSPECIFIED: ICD-10-CM

## 2024-08-14 ENCOUNTER — APPOINTMENT (OUTPATIENT)
Dept: NEUROLOGY | Facility: CLINIC | Age: 54
End: 2024-08-14

## 2024-08-26 ENCOUNTER — APPOINTMENT (OUTPATIENT)
Dept: PAIN MANAGEMENT | Facility: CLINIC | Age: 54
End: 2024-08-26

## 2024-08-28 ENCOUNTER — APPOINTMENT (OUTPATIENT)
Dept: PAIN MANAGEMENT | Facility: CLINIC | Age: 54
End: 2024-08-28
Payer: MEDICARE

## 2024-08-28 DIAGNOSIS — M75.02 ADHESIVE CAPSULITIS OF LEFT SHOULDER: ICD-10-CM

## 2024-08-28 DIAGNOSIS — G89.29 OTHER CHRONIC PAIN: ICD-10-CM

## 2024-08-28 DIAGNOSIS — M79.7 FIBROMYALGIA: ICD-10-CM

## 2024-08-28 DIAGNOSIS — M54.2 CERVICALGIA: ICD-10-CM

## 2024-08-28 DIAGNOSIS — M53.3 SACROCOCCYGEAL DISORDERS, NOT ELSEWHERE CLASSIFIED: ICD-10-CM

## 2024-08-28 DIAGNOSIS — M54.50 LOW BACK PAIN, UNSPECIFIED: ICD-10-CM

## 2024-08-28 PROCEDURE — 99214 OFFICE O/P EST MOD 30 MIN: CPT
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
## 2024-08-28 NOTE — ASSESSMENT
[FreeTextEntry1] : This is a 54-year-old woman who has chronic multi-site pain. She will continue with Percocet 10-325mg BID which allows her to perform ADLs without significant pain. Patient will follow up in 4 weeks for medication refill and reassessment. All this patients' questions were answered, and the conversation was understood well.  Entered by Sheri Neri, acting as scribe for Dr. Rivers.  Documentation recorded by the scribe, in my presence, accurately reflects the service I personally performed, and the decisions made by me with my edits as appropriate.     Thank you for allowing me to assist in the management of this patient.     Best Regards,     Marlen Rivers M.D., FAAPMR     Diplomate, American Board of Physical Medicine and Rehabilitation Diplomate, American Board of Pain Medicine

## 2024-08-28 NOTE — PHYSICAL EXAM
[Normal Coordination] : normal coordination [Normal DTR UE/LE] : normal DTR UE/LE  [Normal Sensation] : normal sensation [Normal Mood and Affect] : normal mood and affect [Oriented] : oriented [Able to Communicate] : able to communicate [Normal Skin] : normal skin [No Rash] : no rash [No Ulcers] : no ulcers [No Lesions] : no lesions [No obvious lymphadenopathy in areas examined] : no obvious lymphadenopathy in areas examined [Well Developed] : well developed [Well Nourished] : well nourished [Flexion] : flexion [Extension] : extension [Left] : left shoulder [] : no scapular winging [TWNoteComboBox7] : active forward flexion 80 degrees [TWNoteComboBox4] : passive forward flexion 90 degrees [de-identified] : active abduction 45 degrees [FreeTextEntry3] : Skin appears shiny, hand reddish in color, hand cooler than right hand [de-identified] : Allodynia, hypersensitivity of left hand

## 2024-08-29 ENCOUNTER — APPOINTMENT (OUTPATIENT)
Dept: PAIN MANAGEMENT | Facility: CLINIC | Age: 54
End: 2024-08-29

## 2024-09-18 ENCOUNTER — APPOINTMENT (OUTPATIENT)
Dept: NEUROLOGY | Facility: CLINIC | Age: 54
End: 2024-09-18
Payer: MEDICARE

## 2024-09-18 VITALS — BODY MASS INDEX: 16.33 KG/M2 | WEIGHT: 98 LBS | HEIGHT: 65 IN

## 2024-09-18 VITALS — SYSTOLIC BLOOD PRESSURE: 108 MMHG | HEART RATE: 77 BPM | DIASTOLIC BLOOD PRESSURE: 72 MMHG

## 2024-09-18 DIAGNOSIS — R56.9 UNSPECIFIED CONVULSIONS: ICD-10-CM

## 2024-09-18 PROCEDURE — 99204 OFFICE O/P NEW MOD 45 MIN: CPT

## 2024-09-18 RX ORDER — CYCLOBENZAPRINE HCL 5 MG
TABLET ORAL
Refills: 0 | Status: ACTIVE | COMMUNITY

## 2024-09-18 RX ORDER — TOPIRAMATE 100 MG/1
100 TABLET, FILM COATED ORAL DAILY
Qty: 270 | Refills: 3 | Status: ACTIVE | COMMUNITY
Start: 2024-09-18 | End: 1900-01-01

## 2024-09-18 RX ORDER — ACETAMINOPHEN 325 MG/1
TABLET ORAL
Refills: 0 | Status: ACTIVE | COMMUNITY

## 2024-09-19 NOTE — ASSESSMENT
[FreeTextEntry1] : Epilepsy-unclear level of control since there is a discrepancy with what patient is reporting and what has been documented.  -I made a long attempt to explain why frequent seizures, even minor ones can be detrimental long-term as well as potential for generalized seizure leading to SUDEP, but patient still decline changing medication or going back to Dr. Montgomery, an epileptologist. - Will continue her current regimen of  topiramate 300 mg daily  Chronic insomnia-possibly secondary to chronic pain - Since patient is on Klonopin as well as other antiseizure medications, I told her that there is nothing additional I can give her safely. -I recommend follow-up with pain management for better pain control first   Total clinician time spent  is  46 minutes including preparing to see the patient, obtaining and/or reviewing and confirming history, performing a medically necessary and appropriate examination, counseling and educating the patient and/or family, documenting clinical information in the HER and communicating and/or referring to other healthcare professionals.

## 2024-09-25 ENCOUNTER — APPOINTMENT (OUTPATIENT)
Dept: PAIN MANAGEMENT | Facility: CLINIC | Age: 54
End: 2024-09-25
Payer: MEDICARE

## 2024-09-25 DIAGNOSIS — Z79.891 LONG TERM (CURRENT) USE OF OPIATE ANALGESIC: ICD-10-CM

## 2024-09-25 DIAGNOSIS — M79.7 FIBROMYALGIA: ICD-10-CM

## 2024-09-25 DIAGNOSIS — G89.29 OTHER CHRONIC PAIN: ICD-10-CM

## 2024-09-25 DIAGNOSIS — M53.3 SACROCOCCYGEAL DISORDERS, NOT ELSEWHERE CLASSIFIED: ICD-10-CM

## 2024-09-25 DIAGNOSIS — M54.50 LOW BACK PAIN, UNSPECIFIED: ICD-10-CM

## 2024-09-25 DIAGNOSIS — M54.2 CERVICALGIA: ICD-10-CM

## 2024-09-25 DIAGNOSIS — G90.519 COMPLEX REGIONAL PAIN SYNDROME I OF UNSPECIFIED UPPER LIMB: ICD-10-CM

## 2024-09-25 PROCEDURE — 99214 OFFICE O/P EST MOD 30 MIN: CPT

## 2024-09-25 NOTE — PHYSICAL EXAM
[Normal Coordination] : normal coordination [Normal DTR UE/LE] : normal DTR UE/LE  [Normal Sensation] : normal sensation [Normal Mood and Affect] : normal mood and affect [Oriented] : oriented [Able to Communicate] : able to communicate [Normal Skin] : normal skin [No Rash] : no rash [No Ulcers] : no ulcers [No Lesions] : no lesions [No obvious lymphadenopathy in areas examined] : no obvious lymphadenopathy in areas examined [Well Developed] : well developed [Well Nourished] : well nourished [Flexion] : flexion [Extension] : extension [Left] : left shoulder [] : no scapular winging [TWNoteComboBox7] : active forward flexion 80 degrees [TWNoteComboBox4] : passive forward flexion 90 degrees [de-identified] : active abduction 45 degrees [FreeTextEntry3] : Skin appears shiny, hand reddish in color, hand cooler than right hand [de-identified] : Allodynia, hypersensitivity of left hand

## 2024-09-25 NOTE — ASSESSMENT
[FreeTextEntry1] : This is a 54-year-old woman who has chronic multi-site pain. She will continue with Percocet 10-325mg BID which allows her to perform ADLs without significant pain. Patient will follow up in 4 weeks for medication refill and reassessment. All of this patient's questions were answered, and the conversation was understood well.  I have consulted the  registry for the purpose of reviewing the patient's controlled substance.   Overall there is at least a 30-50% reduction in pain with the prescribed analgesics. The patient denies any adverse side effects due to the medication (sleeping disturbance, constipation, sleepiness, hallucinations and/or urination problems). There are no inconsistencies on urine toxicology screening which would necessitate an alteration of therapy. The patient will return to the office in 4 weeks' time and is aware to contact me with any question or concerns in the interim.  NETTA Goldsmith MD
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
## 2024-09-25 NOTE — PHYSICAL EXAM
[Normal Coordination] : normal coordination [Normal DTR UE/LE] : normal DTR UE/LE  [Normal Sensation] : normal sensation [Normal Mood and Affect] : normal mood and affect [Oriented] : oriented [Able to Communicate] : able to communicate [Normal Skin] : normal skin [No Rash] : no rash [No Ulcers] : no ulcers [No Lesions] : no lesions [No obvious lymphadenopathy in areas examined] : no obvious lymphadenopathy in areas examined [Well Developed] : well developed [Well Nourished] : well nourished [Flexion] : flexion [Extension] : extension [Left] : left shoulder [] : no scapular winging [TWNoteComboBox7] : active forward flexion 80 degrees [TWNoteComboBox4] : passive forward flexion 90 degrees [de-identified] : active abduction 45 degrees [FreeTextEntry3] : Skin appears shiny, hand reddish in color, hand cooler than right hand [de-identified] : Allodynia, hypersensitivity of left hand

## 2024-10-24 ENCOUNTER — APPOINTMENT (OUTPATIENT)
Dept: PAIN MANAGEMENT | Facility: CLINIC | Age: 54
End: 2024-10-24
Payer: MEDICARE

## 2024-10-24 DIAGNOSIS — G89.29 OTHER CHRONIC PAIN: ICD-10-CM

## 2024-10-24 DIAGNOSIS — Z79.891 LONG TERM (CURRENT) USE OF OPIATE ANALGESIC: ICD-10-CM

## 2024-10-24 DIAGNOSIS — M79.7 FIBROMYALGIA: ICD-10-CM

## 2024-10-24 DIAGNOSIS — M53.3 SACROCOCCYGEAL DISORDERS, NOT ELSEWHERE CLASSIFIED: ICD-10-CM

## 2024-10-24 DIAGNOSIS — M54.2 CERVICALGIA: ICD-10-CM

## 2024-10-24 DIAGNOSIS — G90.519 COMPLEX REGIONAL PAIN SYNDROME I OF UNSPECIFIED UPPER LIMB: ICD-10-CM

## 2024-10-24 DIAGNOSIS — M54.50 LOW BACK PAIN, UNSPECIFIED: ICD-10-CM

## 2024-10-24 PROCEDURE — 99214 OFFICE O/P EST MOD 30 MIN: CPT

## 2024-10-25 ENCOUNTER — LABORATORY RESULT (OUTPATIENT)
Age: 54
End: 2024-10-25

## 2024-11-21 ENCOUNTER — APPOINTMENT (OUTPATIENT)
Dept: PAIN MANAGEMENT | Facility: CLINIC | Age: 54
End: 2024-11-21
Payer: MEDICARE

## 2024-11-21 DIAGNOSIS — Z79.891 LONG TERM (CURRENT) USE OF OPIATE ANALGESIC: ICD-10-CM

## 2024-11-21 DIAGNOSIS — M54.2 CERVICALGIA: ICD-10-CM

## 2024-11-21 DIAGNOSIS — M25.512 PAIN IN LEFT SHOULDER: ICD-10-CM

## 2024-11-21 DIAGNOSIS — M53.3 SACROCOCCYGEAL DISORDERS, NOT ELSEWHERE CLASSIFIED: ICD-10-CM

## 2024-11-21 DIAGNOSIS — M79.7 FIBROMYALGIA: ICD-10-CM

## 2024-11-21 DIAGNOSIS — G90.519 COMPLEX REGIONAL PAIN SYNDROME I OF UNSPECIFIED UPPER LIMB: ICD-10-CM

## 2024-11-21 DIAGNOSIS — G89.29 OTHER CHRONIC PAIN: ICD-10-CM

## 2024-11-21 DIAGNOSIS — M54.50 LOW BACK PAIN, UNSPECIFIED: ICD-10-CM

## 2024-11-21 PROCEDURE — 99214 OFFICE O/P EST MOD 30 MIN: CPT

## 2024-12-19 ENCOUNTER — APPOINTMENT (OUTPATIENT)
Dept: PAIN MANAGEMENT | Facility: CLINIC | Age: 54
End: 2024-12-19
Payer: MEDICARE

## 2024-12-19 DIAGNOSIS — M25.512 PAIN IN LEFT SHOULDER: ICD-10-CM

## 2024-12-19 DIAGNOSIS — Z79.891 LONG TERM (CURRENT) USE OF OPIATE ANALGESIC: ICD-10-CM

## 2024-12-19 DIAGNOSIS — G89.29 OTHER CHRONIC PAIN: ICD-10-CM

## 2024-12-19 DIAGNOSIS — M79.7 FIBROMYALGIA: ICD-10-CM

## 2024-12-19 DIAGNOSIS — M53.3 SACROCOCCYGEAL DISORDERS, NOT ELSEWHERE CLASSIFIED: ICD-10-CM

## 2024-12-19 DIAGNOSIS — M54.50 LOW BACK PAIN, UNSPECIFIED: ICD-10-CM

## 2024-12-19 DIAGNOSIS — M54.2 CERVICALGIA: ICD-10-CM

## 2024-12-19 PROCEDURE — 99214 OFFICE O/P EST MOD 30 MIN: CPT

## 2025-01-16 ENCOUNTER — APPOINTMENT (OUTPATIENT)
Dept: PAIN MANAGEMENT | Facility: CLINIC | Age: 55
End: 2025-01-16
Payer: MEDICARE

## 2025-01-16 DIAGNOSIS — M53.3 SACROCOCCYGEAL DISORDERS, NOT ELSEWHERE CLASSIFIED: ICD-10-CM

## 2025-01-16 DIAGNOSIS — G89.29 OTHER CHRONIC PAIN: ICD-10-CM

## 2025-01-16 DIAGNOSIS — M25.512 PAIN IN LEFT SHOULDER: ICD-10-CM

## 2025-01-16 DIAGNOSIS — G61.81 CHRONIC INFLAMMATORY DEMYELINATING POLYNEURITIS: ICD-10-CM

## 2025-01-16 DIAGNOSIS — Z79.891 LONG TERM (CURRENT) USE OF OPIATE ANALGESIC: ICD-10-CM

## 2025-01-16 DIAGNOSIS — M54.2 CERVICALGIA: ICD-10-CM

## 2025-01-16 DIAGNOSIS — M79.7 FIBROMYALGIA: ICD-10-CM

## 2025-01-16 DIAGNOSIS — M54.50 LOW BACK PAIN, UNSPECIFIED: ICD-10-CM

## 2025-01-16 PROCEDURE — 99214 OFFICE O/P EST MOD 30 MIN: CPT

## 2025-02-13 ENCOUNTER — APPOINTMENT (OUTPATIENT)
Dept: PAIN MANAGEMENT | Facility: CLINIC | Age: 55
End: 2025-02-13
Payer: MEDICARE

## 2025-02-13 DIAGNOSIS — M75.02 ADHESIVE CAPSULITIS OF LEFT SHOULDER: ICD-10-CM

## 2025-02-13 DIAGNOSIS — M79.7 FIBROMYALGIA: ICD-10-CM

## 2025-02-13 DIAGNOSIS — Z79.891 LONG TERM (CURRENT) USE OF OPIATE ANALGESIC: ICD-10-CM

## 2025-02-13 DIAGNOSIS — M54.2 CERVICALGIA: ICD-10-CM

## 2025-02-13 DIAGNOSIS — M53.3 SACROCOCCYGEAL DISORDERS, NOT ELSEWHERE CLASSIFIED: ICD-10-CM

## 2025-02-13 DIAGNOSIS — M54.50 LOW BACK PAIN, UNSPECIFIED: ICD-10-CM

## 2025-02-13 DIAGNOSIS — G89.29 OTHER CHRONIC PAIN: ICD-10-CM

## 2025-02-13 DIAGNOSIS — G61.81 CHRONIC INFLAMMATORY DEMYELINATING POLYNEURITIS: ICD-10-CM

## 2025-02-13 PROCEDURE — 99214 OFFICE O/P EST MOD 30 MIN: CPT

## 2025-03-13 ENCOUNTER — APPOINTMENT (OUTPATIENT)
Dept: PAIN MANAGEMENT | Facility: CLINIC | Age: 55
End: 2025-03-13
Payer: MEDICARE

## 2025-03-13 DIAGNOSIS — M54.2 CERVICALGIA: ICD-10-CM

## 2025-03-13 DIAGNOSIS — G89.29 OTHER CHRONIC PAIN: ICD-10-CM

## 2025-03-13 DIAGNOSIS — G90.519 COMPLEX REGIONAL PAIN SYNDROME I OF UNSPECIFIED UPPER LIMB: ICD-10-CM

## 2025-03-13 DIAGNOSIS — M75.02 ADHESIVE CAPSULITIS OF LEFT SHOULDER: ICD-10-CM

## 2025-03-13 DIAGNOSIS — M79.7 FIBROMYALGIA: ICD-10-CM

## 2025-03-13 DIAGNOSIS — Z79.891 LONG TERM (CURRENT) USE OF OPIATE ANALGESIC: ICD-10-CM

## 2025-03-13 DIAGNOSIS — M47.817 SPONDYLOSIS W/OUT MYELOPATHY OR RADICULOPATHY, LUMBOSACRAL REGION: ICD-10-CM

## 2025-03-13 DIAGNOSIS — G61.81 CHRONIC INFLAMMATORY DEMYELINATING POLYNEURITIS: ICD-10-CM

## 2025-03-13 DIAGNOSIS — M54.50 LOW BACK PAIN, UNSPECIFIED: ICD-10-CM

## 2025-03-13 PROCEDURE — 99214 OFFICE O/P EST MOD 30 MIN: CPT

## 2025-03-14 ENCOUNTER — LABORATORY RESULT (OUTPATIENT)
Age: 55
End: 2025-03-14

## 2025-04-10 ENCOUNTER — APPOINTMENT (OUTPATIENT)
Dept: PAIN MANAGEMENT | Facility: CLINIC | Age: 55
End: 2025-04-10
Payer: MEDICARE

## 2025-04-10 DIAGNOSIS — G89.29 OTHER CHRONIC PAIN: ICD-10-CM

## 2025-04-10 DIAGNOSIS — M53.3 SACROCOCCYGEAL DISORDERS, NOT ELSEWHERE CLASSIFIED: ICD-10-CM

## 2025-04-10 DIAGNOSIS — M79.7 FIBROMYALGIA: ICD-10-CM

## 2025-04-10 DIAGNOSIS — M54.50 LOW BACK PAIN, UNSPECIFIED: ICD-10-CM

## 2025-04-10 DIAGNOSIS — G90.519 COMPLEX REGIONAL PAIN SYNDROME I OF UNSPECIFIED UPPER LIMB: ICD-10-CM

## 2025-04-10 DIAGNOSIS — Z79.891 LONG TERM (CURRENT) USE OF OPIATE ANALGESIC: ICD-10-CM

## 2025-04-10 DIAGNOSIS — G61.81 CHRONIC INFLAMMATORY DEMYELINATING POLYNEURITIS: ICD-10-CM

## 2025-04-10 DIAGNOSIS — M47.817 SPONDYLOSIS W/OUT MYELOPATHY OR RADICULOPATHY, LUMBOSACRAL REGION: ICD-10-CM

## 2025-04-10 DIAGNOSIS — M54.2 CERVICALGIA: ICD-10-CM

## 2025-04-10 PROCEDURE — 99214 OFFICE O/P EST MOD 30 MIN: CPT

## 2025-04-28 NOTE — ED PROVIDER NOTE - DOMESTIC TRAVEL HIGH RISK QUESTION
Referral for procedure from  [facility] referral (see Appts tab)      Spoke to patient to schedule procedure(s) Colonoscopy       Physician to perform procedure(s) Dr. CHELI Alcantara  Date of Procedure (s) 5/14/25  Arrival Time 7:15 AM  Time of Procedure(s) 8:15 AM   Location of Procedure(s) [facility] [address]  Type of Rx Prep sent to patient: PEG  Instructions provided to patient via [facility]    Patient was informed on the following information and verbalized understanding. Screening questionnaire reviewed with patient and complete. If procedure requires anesthesia, a responsible adult needs to be present to accompany the patient home, patient cannot drive after receiving anesthesia. Appointment details are tentative, especially check-in time. Patient will receive a prep-op call 7 days prior to confirm check-in time for procedure. If applicable the patient should contact their pharmacy to verify Rx for procedure prep is ready for pick-up. Patient was advised to call the scheduling department at [phone] if pharmacy states no Rx is available. Patient was advised to call the endoscopy scheduling department if any questions or concerns arise.      [facility] Scheduling Department     
No

## 2025-05-05 NOTE — ED ADULT NURSE NOTE - TEMPLATE
normal appearance , without tenderness upon palpation , no deformities , trachea midline , Thyroid normal size , no masses , thyroid nontender
General

## 2025-05-08 ENCOUNTER — RESULT CHARGE (OUTPATIENT)
Age: 55
End: 2025-05-08

## 2025-05-08 ENCOUNTER — APPOINTMENT (OUTPATIENT)
Dept: RADIOLOGY | Facility: CLINIC | Age: 55
End: 2025-05-08

## 2025-05-08 ENCOUNTER — APPOINTMENT (OUTPATIENT)
Dept: PAIN MANAGEMENT | Facility: CLINIC | Age: 55
End: 2025-05-08
Payer: MEDICARE

## 2025-05-08 DIAGNOSIS — G89.29 OTHER CHRONIC PAIN: ICD-10-CM

## 2025-05-08 DIAGNOSIS — G61.81 CHRONIC INFLAMMATORY DEMYELINATING POLYNEURITIS: ICD-10-CM

## 2025-05-08 DIAGNOSIS — G90.519 COMPLEX REGIONAL PAIN SYNDROME I OF UNSPECIFIED UPPER LIMB: ICD-10-CM

## 2025-05-08 DIAGNOSIS — M47.817 SPONDYLOSIS W/OUT MYELOPATHY OR RADICULOPATHY, LUMBOSACRAL REGION: ICD-10-CM

## 2025-05-08 DIAGNOSIS — M25.512 PAIN IN LEFT SHOULDER: ICD-10-CM

## 2025-05-08 DIAGNOSIS — Z79.891 LONG TERM (CURRENT) USE OF OPIATE ANALGESIC: ICD-10-CM

## 2025-05-08 DIAGNOSIS — M79.7 FIBROMYALGIA: ICD-10-CM

## 2025-05-08 DIAGNOSIS — M54.2 CERVICALGIA: ICD-10-CM

## 2025-05-08 DIAGNOSIS — M53.3 SACROCOCCYGEAL DISORDERS, NOT ELSEWHERE CLASSIFIED: ICD-10-CM

## 2025-05-08 DIAGNOSIS — M54.50 LOW BACK PAIN, UNSPECIFIED: ICD-10-CM

## 2025-05-08 PROCEDURE — 99214 OFFICE O/P EST MOD 30 MIN: CPT

## 2025-05-08 PROCEDURE — 73030 X-RAY EXAM OF SHOULDER: CPT | Mod: LT

## 2025-05-15 ENCOUNTER — APPOINTMENT (OUTPATIENT)
Dept: PAIN MANAGEMENT | Facility: CLINIC | Age: 55
End: 2025-05-15
Payer: MEDICARE

## 2025-05-15 ENCOUNTER — APPOINTMENT (OUTPATIENT)
Dept: ORTHOPEDIC SURGERY | Facility: CLINIC | Age: 55
End: 2025-05-15
Payer: MEDICARE

## 2025-05-15 VITALS — WEIGHT: 95 LBS | HEIGHT: 65 IN | BODY MASS INDEX: 15.83 KG/M2

## 2025-05-15 DIAGNOSIS — M75.02 ADHESIVE CAPSULITIS OF LEFT SHOULDER: ICD-10-CM

## 2025-05-15 DIAGNOSIS — S62.112A DISPLACED FRACTURE OF TRIQUETRUM [CUNEIFORM] BONE, LEFT WRIST, INITIAL ENCOUNTER FOR CLOSED FRACTURE: ICD-10-CM

## 2025-05-15 PROCEDURE — 73110 X-RAY EXAM OF WRIST: CPT | Mod: LT

## 2025-05-15 PROCEDURE — 99203 OFFICE O/P NEW LOW 30 MIN: CPT | Mod: 25

## 2025-05-15 PROCEDURE — 20611 DRAIN/INJ JOINT/BURSA W/US: CPT | Mod: LT

## 2025-06-05 ENCOUNTER — APPOINTMENT (OUTPATIENT)
Dept: PAIN MANAGEMENT | Facility: CLINIC | Age: 55
End: 2025-06-05
Payer: MEDICARE

## 2025-06-05 DIAGNOSIS — M54.2 CERVICALGIA: ICD-10-CM

## 2025-06-05 DIAGNOSIS — G89.29 OTHER CHRONIC PAIN: ICD-10-CM

## 2025-06-05 DIAGNOSIS — M47.817 SPONDYLOSIS W/OUT MYELOPATHY OR RADICULOPATHY, LUMBOSACRAL REGION: ICD-10-CM

## 2025-06-05 DIAGNOSIS — M25.512 PAIN IN LEFT SHOULDER: ICD-10-CM

## 2025-06-05 DIAGNOSIS — M79.7 FIBROMYALGIA: ICD-10-CM

## 2025-06-05 DIAGNOSIS — M54.50 LOW BACK PAIN, UNSPECIFIED: ICD-10-CM

## 2025-06-05 DIAGNOSIS — G61.81 CHRONIC INFLAMMATORY DEMYELINATING POLYNEURITIS: ICD-10-CM

## 2025-06-05 DIAGNOSIS — G90.519 COMPLEX REGIONAL PAIN SYNDROME I OF UNSPECIFIED UPPER LIMB: ICD-10-CM

## 2025-06-05 DIAGNOSIS — M53.3 SACROCOCCYGEAL DISORDERS, NOT ELSEWHERE CLASSIFIED: ICD-10-CM

## 2025-06-05 DIAGNOSIS — Z79.891 LONG TERM (CURRENT) USE OF OPIATE ANALGESIC: ICD-10-CM

## 2025-06-05 PROCEDURE — 99214 OFFICE O/P EST MOD 30 MIN: CPT

## 2025-06-11 ENCOUNTER — APPOINTMENT (OUTPATIENT)
Dept: OBGYN | Facility: CLINIC | Age: 55
End: 2025-06-11
Payer: MEDICARE

## 2025-06-11 VITALS — HEIGHT: 65 IN | BODY MASS INDEX: 15.83 KG/M2 | WEIGHT: 95 LBS

## 2025-06-11 VITALS
SYSTOLIC BLOOD PRESSURE: 101 MMHG | DIASTOLIC BLOOD PRESSURE: 66 MMHG | WEIGHT: 95 LBS | BODY MASS INDEX: 15.83 KG/M2 | HEIGHT: 65 IN

## 2025-06-11 PROBLEM — Z01.419 ENCOUNTER FOR ANNUAL ROUTINE GYNECOLOGICAL EXAMINATION: Status: ACTIVE | Noted: 2025-06-11

## 2025-06-11 LAB
APPEARANCE: CLEAR
BILIRUBIN URINE: NEGATIVE
BLOOD URINE: ABNORMAL
COLOR: YELLOW
GLUCOSE QUALITATIVE U: NEGATIVE
KETONES URINE: NEGATIVE
LEUKOCYTE ESTERASE URINE: NEGATIVE
NITRITE URINE: NEGATIVE
PH URINE: 6
PROTEIN URINE: NEGATIVE
SPECIFIC GRAVITY URINE: 1.02
UROBILINOGEN URINE: 0.2 (ref 0.2–?)

## 2025-06-11 PROCEDURE — 99386 PREV VISIT NEW AGE 40-64: CPT

## 2025-06-12 ENCOUNTER — APPOINTMENT (OUTPATIENT)
Dept: ORTHOPEDIC SURGERY | Facility: CLINIC | Age: 55
End: 2025-06-12
Payer: MEDICARE

## 2025-06-12 PROCEDURE — 73110 X-RAY EXAM OF WRIST: CPT | Mod: LT

## 2025-06-12 PROCEDURE — 99213 OFFICE O/P EST LOW 20 MIN: CPT

## 2025-06-16 LAB — HPV HIGH+LOW RISK DNA PNL CVX: NOT DETECTED

## 2025-07-03 ENCOUNTER — APPOINTMENT (OUTPATIENT)
Dept: PAIN MANAGEMENT | Facility: CLINIC | Age: 55
End: 2025-07-03

## 2025-07-03 PROCEDURE — 99214 OFFICE O/P EST MOD 30 MIN: CPT

## 2025-07-09 ENCOUNTER — LABORATORY RESULT (OUTPATIENT)
Age: 55
End: 2025-07-09

## 2025-07-23 ENCOUNTER — APPOINTMENT (OUTPATIENT)
Dept: ORTHOPEDIC SURGERY | Facility: CLINIC | Age: 55
End: 2025-07-23

## 2025-07-29 DIAGNOSIS — N39.0 URINARY TRACT INFECTION, SITE NOT SPECIFIED: ICD-10-CM

## 2025-07-29 RX ORDER — SULFAMETHOXAZOLE AND TRIMETHOPRIM 800; 160 MG/1; MG/1
800-160 TABLET ORAL TWICE DAILY
Qty: 14 | Refills: 0 | Status: ACTIVE | COMMUNITY
Start: 2025-07-29 | End: 1900-01-01

## 2025-07-31 ENCOUNTER — NON-APPOINTMENT (OUTPATIENT)
Age: 55
End: 2025-07-31

## 2025-07-31 ENCOUNTER — APPOINTMENT (OUTPATIENT)
Dept: PAIN MANAGEMENT | Facility: CLINIC | Age: 55
End: 2025-07-31
Payer: MEDICARE

## 2025-07-31 DIAGNOSIS — M79.7 FIBROMYALGIA: ICD-10-CM

## 2025-07-31 DIAGNOSIS — M47.817 SPONDYLOSIS W/OUT MYELOPATHY OR RADICULOPATHY, LUMBOSACRAL REGION: ICD-10-CM

## 2025-07-31 DIAGNOSIS — M75.02 ADHESIVE CAPSULITIS OF LEFT SHOULDER: ICD-10-CM

## 2025-07-31 DIAGNOSIS — M54.2 CERVICALGIA: ICD-10-CM

## 2025-07-31 DIAGNOSIS — G89.29 OTHER CHRONIC PAIN: ICD-10-CM

## 2025-07-31 DIAGNOSIS — G90.519 COMPLEX REGIONAL PAIN SYNDROME I OF UNSPECIFIED UPPER LIMB: ICD-10-CM

## 2025-07-31 DIAGNOSIS — M25.512 PAIN IN LEFT SHOULDER: ICD-10-CM

## 2025-07-31 DIAGNOSIS — G61.81 CHRONIC INFLAMMATORY DEMYELINATING POLYNEURITIS: ICD-10-CM

## 2025-07-31 DIAGNOSIS — Z79.891 LONG TERM (CURRENT) USE OF OPIATE ANALGESIC: ICD-10-CM

## 2025-07-31 DIAGNOSIS — M53.3 SACROCOCCYGEAL DISORDERS, NOT ELSEWHERE CLASSIFIED: ICD-10-CM

## 2025-07-31 DIAGNOSIS — M54.50 LOW BACK PAIN, UNSPECIFIED: ICD-10-CM

## 2025-07-31 PROCEDURE — 99214 OFFICE O/P EST MOD 30 MIN: CPT

## 2025-08-04 ENCOUNTER — NON-APPOINTMENT (OUTPATIENT)
Age: 55
End: 2025-08-04

## 2025-08-04 DIAGNOSIS — N76.0 ACUTE VAGINITIS: ICD-10-CM

## 2025-08-04 LAB — BACTERIA UR CULT: NORMAL

## 2025-08-04 RX ORDER — FLUCONAZOLE 150 MG/1
150 TABLET ORAL
Qty: 2 | Refills: 3 | Status: ACTIVE | COMMUNITY
Start: 2025-08-04 | End: 1900-01-01

## 2025-08-07 ENCOUNTER — APPOINTMENT (OUTPATIENT)
Dept: OBGYN | Facility: CLINIC | Age: 55
End: 2025-08-07

## 2025-08-28 ENCOUNTER — APPOINTMENT (OUTPATIENT)
Dept: PAIN MANAGEMENT | Facility: CLINIC | Age: 55
End: 2025-08-28
Payer: MEDICARE

## 2025-08-28 DIAGNOSIS — G89.29 OTHER CHRONIC PAIN: ICD-10-CM

## 2025-08-28 DIAGNOSIS — M53.3 SACROCOCCYGEAL DISORDERS, NOT ELSEWHERE CLASSIFIED: ICD-10-CM

## 2025-08-28 DIAGNOSIS — Z79.891 LONG TERM (CURRENT) USE OF OPIATE ANALGESIC: ICD-10-CM

## 2025-08-28 DIAGNOSIS — M54.50 LOW BACK PAIN, UNSPECIFIED: ICD-10-CM

## 2025-08-28 DIAGNOSIS — G61.81 CHRONIC INFLAMMATORY DEMYELINATING POLYNEURITIS: ICD-10-CM

## 2025-08-28 DIAGNOSIS — M75.02 ADHESIVE CAPSULITIS OF LEFT SHOULDER: ICD-10-CM

## 2025-08-28 DIAGNOSIS — M54.2 CERVICALGIA: ICD-10-CM

## 2025-08-28 DIAGNOSIS — M47.817 SPONDYLOSIS W/OUT MYELOPATHY OR RADICULOPATHY, LUMBOSACRAL REGION: ICD-10-CM

## 2025-08-28 DIAGNOSIS — G90.519 COMPLEX REGIONAL PAIN SYNDROME I OF UNSPECIFIED UPPER LIMB: ICD-10-CM

## 2025-08-28 DIAGNOSIS — M25.512 PAIN IN LEFT SHOULDER: ICD-10-CM

## 2025-08-28 DIAGNOSIS — M79.7 FIBROMYALGIA: ICD-10-CM

## 2025-08-28 PROCEDURE — 99214 OFFICE O/P EST MOD 30 MIN: CPT
